# Patient Record
Sex: FEMALE | Race: WHITE | Employment: OTHER | URBAN - METROPOLITAN AREA
[De-identification: names, ages, dates, MRNs, and addresses within clinical notes are randomized per-mention and may not be internally consistent; named-entity substitution may affect disease eponyms.]

---

## 2017-01-05 ENCOUNTER — TRANSFERRED RECORDS (OUTPATIENT)
Dept: FAMILY MEDICINE | Facility: CLINIC | Age: 82
End: 2017-01-05

## 2017-01-05 LAB
ALBUMIN SERPL-MCNC: 3.6 G/DL (ref 3.4–5)
ALP SERPL-CCNC: 139 U/L (ref 46–116)
ALT SERPL-CCNC: 32 U/L (ref 0–78)
ANION GAP SERPL CALCULATED.3IONS-SCNC: 15.2 MMOL/L
AST SERPL-CCNC: 61 U/L (ref 0–37)
BILIRUB SERPL-MCNC: 1.5 MG/DL (ref 0.1–1.2)
BUN SERPL-MCNC: 13 MG/DL (ref 5–22)
CALCIUM SERPL-MCNC: 9.3 MG/DL (ref 8.5–10.1)
CHLORIDE SERPLBLD-SCNC: 109 MMOL/L (ref 98–107)
CO2 SERPL-SCNC: 25 MMOL/L (ref 21–32)
CREAT SERPL-MCNC: 0.97 MG/DL (ref 0.6–1.02)
ERYTHROCYTE [DISTWIDTH] IN BLOOD BY AUTOMATED COUNT: 15.3 %
GLUCOSE SERPL-MCNC: 121 MG/DL (ref 70–99)
HCT VFR BLD AUTO: 41.6 % (ref 35–47)
HEMOGLOBIN: 12.9 G/DL (ref 11.7–15.7)
INR PPP: 1.2 (ref 1–1.2)
MCH RBC QN AUTO: 27.9 PG (ref 26–33)
MCHC RBC AUTO-ENTMCNC: 31 G/DL (ref 31–36)
MCV RBC AUTO: 90 FL (ref 78–100)
PLATELET # BLD AUTO: 180 10^9/L
POTASSIUM SERPL-SCNC: 3.8 MMOL/L (ref 3.5–5.1)
PROT SERPL-MCNC: 6.7 G/DL (ref 6.4–8.2)
RBC # BLD AUTO: 4.61 10*12/L (ref 3.8–5.2)
SODIUM SERPL-SCNC: 145 MMOL/L (ref 136–145)
WBC # BLD AUTO: 7.7 10*9/L (ref 4–11)

## 2017-01-06 ENCOUNTER — TRANSFERRED RECORDS (OUTPATIENT)
Dept: FAMILY MEDICINE | Facility: CLINIC | Age: 82
End: 2017-01-06

## 2017-01-12 DIAGNOSIS — I10 ESSENTIAL HYPERTENSION, BENIGN: Primary | ICD-10-CM

## 2017-01-12 DIAGNOSIS — K74.60 CIRRHOSIS, NON-ALCOHOLIC (H): ICD-10-CM

## 2017-01-12 DIAGNOSIS — R00.0 SINUS TACHYCARDIA: ICD-10-CM

## 2017-01-12 DIAGNOSIS — E66.811 OBESITY (BMI 30.0-34.9): ICD-10-CM

## 2017-01-12 RX ORDER — METOPROLOL TARTRATE 25 MG/1
TABLET, FILM COATED ORAL
Qty: 180 TABLET | Refills: 0 | OUTPATIENT
Start: 2017-01-12

## 2017-01-12 RX ORDER — SPIRONOLACTONE 25 MG/1
TABLET ORAL
Qty: 90 TABLET | Refills: 0 | OUTPATIENT
Start: 2017-01-12

## 2017-01-12 NOTE — TELEPHONE ENCOUNTER
Pending Prescriptions:                       Disp   Refills    spironolactone (ALDACTONE) 25 MG tablet [*90 tab*0            Sig: TAKE ONE TABLET BY MOUTH ONE TIME DAILY     metoprolol (LOPRESSOR) 25 MG tablet [Phar*180 ta*0            Sig: TAKE ONE TABLET BY MOUTH TWICE DAILY     Pt refills were for one month and two months.   Pt should of been out. Per notes, 8-4-2016 pt was to rtc in three months. Pt was here in  for another reason, no blood work.   Please change qty, advise, or fax, or send to KAYLA Arroyo  [phone] (home)

## 2017-01-12 NOTE — TELEPHONE ENCOUNTER
I think she is getting her medications from cardiology since she should have run out 11/2016.  Denied

## 2017-01-19 RX ORDER — METOPROLOL TARTRATE 25 MG/1
TABLET, FILM COATED ORAL
Qty: 180 TABLET | Refills: 0 | OUTPATIENT
Start: 2017-01-19

## 2017-01-19 RX ORDER — SPIRONOLACTONE 25 MG/1
TABLET ORAL
Qty: 90 TABLET | Refills: 0 | OUTPATIENT
Start: 2017-01-19

## 2017-01-19 NOTE — TELEPHONE ENCOUNTER
Refused Prescriptions:                       Disp   Refills    metoprolol (LOPRESSOR) 25 MG tablet [Pharm*180 ta*0        Sig: TAKE ONE TABLET BY MOUTH TWICE DAILY   Refused By: SELIN GUTIERREZ  Reason for Refusal: Originating/Specialty Provider to approve  Reason for Refusal Comment: cardiology    spironolactone (ALDACTONE) 25 MG tablet [P*90 tab*0        Sig: TAKE ONE TABLET BY MOUTH ONE TIME DAILY   Refused By: SELIN GUTIERREZ  Reason for Refusal: Originating/Specialty Provider to approve  Reason for Refusal Comment: cardiology     See refill encounter with same med request. 1-  Denied by [facility], should of been out by / Cardiology doing Rx's.  Cruz  [phone] (home)

## 2017-01-20 RX ORDER — METOPROLOL TARTRATE 25 MG/1
TABLET, FILM COATED ORAL
Qty: 60 TABLET | Refills: 0 | OUTPATIENT
Start: 2017-01-20

## 2017-01-20 RX ORDER — SPIRONOLACTONE 25 MG/1
TABLET ORAL
Qty: 30 TABLET | Refills: 0 | OUTPATIENT
Start: 2017-01-20

## 2017-01-20 NOTE — TELEPHONE ENCOUNTER
Refused Prescriptions:                       Disp   Refills    spironolactone (ALDACTONE) 25 MG tablet [P*30 tab*0        Sig: TAKE ONE TABLET BY MOUTH ONE TIME DAILY   Refused By: SELIN GUTIERREZ  Reason for Refusal: Originating/Specialty Provider to approve    metoprolol (LOPRESSOR) 25 MG tablet [Pharm*60 tab*0        Sig: TAKE ONE TABLET BY MOUTH TWICE DAILY   Refused By: SELIN GUTIERREZ  Reason for Refusal: Originating/Specialty Provider to approve    See refill encounter from 1/12-1-  Cruz  [phone] (home)

## 2017-01-23 ENCOUNTER — OFFICE VISIT (OUTPATIENT)
Dept: FAMILY MEDICINE | Facility: CLINIC | Age: 82
End: 2017-01-23

## 2017-01-23 VITALS
BODY MASS INDEX: 32.09 KG/M2 | DIASTOLIC BLOOD PRESSURE: 80 MMHG | OXYGEN SATURATION: 90 % | SYSTOLIC BLOOD PRESSURE: 126 MMHG | HEART RATE: 89 BPM | WEIGHT: 192.6 LBS | TEMPERATURE: 97.8 F | HEIGHT: 65 IN | RESPIRATION RATE: 16 BRPM

## 2017-01-23 DIAGNOSIS — R00.0 SINUS TACHYCARDIA: ICD-10-CM

## 2017-01-23 DIAGNOSIS — I10 ESSENTIAL HYPERTENSION, BENIGN: ICD-10-CM

## 2017-01-23 DIAGNOSIS — K74.60 CIRRHOSIS, NON-ALCOHOLIC (H): ICD-10-CM

## 2017-01-23 DIAGNOSIS — E66.811 OBESITY (BMI 30.0-34.9): ICD-10-CM
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
## 2017-01-23 DIAGNOSIS — E11.49 DIABETES MELLITUS TYPE 2 WITH NEUROLOGICAL MANIFESTATIONS (H): Primary | ICD-10-CM

## 2017-01-23 DIAGNOSIS — E78.2 MIXED HYPERLIPIDEMIA: ICD-10-CM

## 2017-01-23 LAB — HBA1C MFR BLD: 6.9 % (ref 4–7)

## 2017-01-23 PROCEDURE — 83036 HEMOGLOBIN GLYCOSYLATED A1C: CPT | Performed by: FAMILY MEDICINE

## 2017-01-23 PROCEDURE — 99214 OFFICE O/P EST MOD 30 MIN: CPT | Performed by: FAMILY MEDICINE

## 2017-01-23 PROCEDURE — 36415 COLL VENOUS BLD VENIPUNCTURE: CPT | Performed by: FAMILY MEDICINE

## 2017-01-23 RX ORDER — SIMVASTATIN 20 MG
20 TABLET ORAL AT BEDTIME
Qty: 30 TABLET | Refills: 0 | Status: SHIPPED | OUTPATIENT
Start: 2017-01-23 | End: 2017-02-22

## 2017-01-23 RX ORDER — BENZOCAINE/MENTHOL 6 MG-10 MG
LOZENGE MUCOUS MEMBRANE
COMMUNITY
Start: 2017-01-23 | End: 2018-01-23

## 2017-01-23 RX ORDER — SPIRONOLACTONE 25 MG/1
25 TABLET ORAL DAILY
Qty: 30 TABLET | Refills: 0 | Status: SHIPPED | OUTPATIENT
Start: 2017-01-23 | End: 2017-02-22

## 2017-01-23 RX ORDER — METOPROLOL TARTRATE 25 MG/1
25 TABLET, FILM COATED ORAL 2 TIMES DAILY
Qty: 60 TABLET | Refills: 0 | Status: SHIPPED | OUTPATIENT
Start: 2017-01-23 | End: 2017-02-22

## 2017-01-23 RX ORDER — GINSENG 100 MG
CAPSULE ORAL 2 TIMES DAILY
COMMUNITY
Start: 2017-01-23 | End: 2018-01-23

## 2017-01-23 RX ORDER — METFORMIN HCL 500 MG
2000 TABLET, EXTENDED RELEASE 24 HR ORAL
Qty: 120 TABLET | Refills: 0 | Status: SHIPPED | OUTPATIENT
Start: 2017-01-23 | End: 2017-02-22

## 2017-01-23 RX ORDER — LOSARTAN POTASSIUM 50 MG/1
50 TABLET ORAL DAILY
Qty: 30 TABLET | Refills: 0 | Status: SHIPPED | OUTPATIENT
Start: 2017-01-23 | End: 2017-02-22

## 2017-01-23 NOTE — NURSING NOTE
Evy Song is here for bed bugs with red marks on back and left arm. And medication refill on Spironolactone and Metoprolol.     Pre-Visit Screening :  Immunizations : up to date    Colonoscopy : is up to date  Mammogram : is up to date  Asthma Action Test/Plan : NA  PHQ9/GAD7 :  NA    BP done on the left arm, with a reg sized cuff.  Pulse - regular  My Chart - accepts    CLASSIFICATION OF OVERWEIGHT AND OBESITY BY BMI                         Obesity Class           BMI(kg/m2)  Underweight                                    < 18.5  Normal                                         18.5-24.9  Overweight                                     25.0-29.9  OBESITY                     I                  30.0-34.9                              II                 35.0-39.9  EXTREME OBESITY             III                >40                             Patient's  BMI Body mass index is 32.05 kg/(m^2).  http://hin.nhlbi.nih.gov/menuplanner/menu.cgi  Questioned patient about current smoking habits.  Pt. has never smoked.

## 2017-01-23 NOTE — PROGRESS NOTES
SUBJECTIVE:    Recheck bug bites: getting pest control and using topical medications/improving    Evy Song is an 82 year old female who presents for evaluation and treatment   of Type 2 diabetes mellitus. SHE HAS BEEN ATKING 2 METFORMIN daily instead of 4.  Age at diagnosis 75. Family history   positive for diabetes in the patient s father.   Previous treatment modalities employed include diet, oral agents, exercise and ASA.   Current treatment includes none.     Current monitoring regimen: home blood tests - weekly  Home blood sugar records: 130-200  Last HgbA1c: 6.9  Diabetic complications: peripheral neuropathy and cardiovascular disease  Cardiovascular risk factors: family history, lipids, diabetes mellitus, hypertension, obesity, sedentary life style and cirrhosis/ tachycardia with VT    Current Outpatient Prescriptions   Medication     bacitracin 500 UNIT/GM OINT     hydrocortisone (CORTAID) 1 % cream     spironolactone (ALDACTONE) 25 MG tablet     metFORMIN (GLUCOPHAGE-XR) 500 MG 24 hr tablet     metoprolol (LOPRESSOR) 25 MG tablet     losartan (COZAAR) 50 MG tablet     simvastatin (ZOCOR) 20 MG tablet     rOPINIRole (REQUIP) 0.25 MG tablet     ferrous gluconate (FERGON) 324 (38 FE) MG tablet     fluticasone (FLONASE) 50 MCG/ACT nasal spray     ALBUTEROL 108 (90 BASE) MCG/ACT inhaler     ONE TOUCH ULTRA test strip     RESTASIS 0.05 % ophthalmic emulsion     LYRICA 75 MG capsule     EPINEPHrine (EPIPEN 2-LALA) 0.3 MG/0.3ML injection     polycarbophil (FIBERCON) 625 MG tablet     AZOPT 1 % OP SUSP     XALATAN 0.005 % OP SOLN     ASPIRIN 81 MG OR TABS     [DISCONTINUED] spironolactone (ALDACTONE) 25 MG tablet     [DISCONTINUED] metFORMIN (GLUCOPHAGE-XR) 500 MG 24 hr tablet     [DISCONTINUED] metoprolol (LOPRESSOR) 25 MG tablet     [DISCONTINUED] losartan (COZAAR) 50 MG tablet     [DISCONTINUED] simvastatin (ZOCOR) 20 MG tablet     No current facility-administered medications for this visit.     Allergies  "  Allergen Reactions     Monosodium Glutamate Cough and Shortness Of Breath     Timolol Rash     Bee Venom      hives,anaphylaxis     Benadryl [Diphenhydramine] Other (See Comments)     Lightheaded     Celecoxib      spasms       Social History   Substance Use Topics     Smoking status: Never Smoker      Smokeless tobacco: Never Used     Alcohol Use: No       Review Of Systems  Skin: bites from recent bed bug infiltration/ brings a bag of the bugs/ Children are working to destroy  Eyes: negative  Ears/Nose/Throat: negative  Respiratory: Cough- seeing pulmonary  Cardiovascular: tachycardia seeing cardiology  Gastrointestinal: negative  Genitourinary: cirrhosis nonalcoholic  Musculoskeletal: negative  Neurologic: numbness or tingling of feet and restless legs  Psychiatric: sleep disturbance and feeling anxious  Hematologic/Lymphatic/Immunologic: anemia  Endocrine: diabetes    OBJECTIVE:  /80 mmHg  Pulse 89  Temp(Src) 97.8  F (36.6  C) (Oral)  Ht 1.651 m (5' 5\")  Wt 87.363 kg (192 lb 9.6 oz)  BMI 32.05 kg/m2  SpO2 90%  General appearance: healthy, alert, no distress, cooperative, smiling, over weight and angry about being on medications  Skin: positives: scattered bites with excoriations  Eyes: conjunctivae/corneas clear. PERRL, EOM's intact. Fundi benign  Ears: negative  Oropharynx: Lips, mucosa, and tongue normal. Teeth and gums normal.  Neck: Neck supple. No adenopathy. Thyroid symmetric, normal size,, Carotids without bruits.  Lungs: negative, Percussion normal. Good diaphragmatic excursion. Lungs clear  Heart: negative, PMI normal. No lifts, heaves, or thrills. RRR. No murmurs, clicks gallops or rub  Abdomen: Abdomen soft, non-tender. BS normal. No masses, organomegaly, positive findings: distended  Extremities: Extremities normal. No deformities, edema, or skin discoloration.  Peripheral pulses: radial=4/4, femoral=4/4, popliteal=4/4, dorsalis pedis=4/4,  Neuro: Gait normal. Reflexes normal and " symmetric. Sensation grossly WNL.    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      (E66.9) Obesity (BMI 30.0-34.9)  Plan: metFORMIN (GLUCOPHAGE-XR) 500 MG 24 hr tablet,         simvastatin (ZOCOR) 20 MG tablet, Hemoglobin         A1c (BFP), VENOUS COLLECTION        A low fat diet, regular aerobic exercise like walking 30 minutes daily and weight control is the treatment recommendations at this time    Reviewed concepts of diabetes self-management stressing the primary   role of the patient in monitoring and maintaining control of   diabetes.

## 2017-01-23 NOTE — PATIENT INSTRUCTIONS
Start back on your daily medication    Recheck fasting in 4 weeks    Reviewed concepts of diabetes self-management stressing the primary   role of the patient in monitoring and maintaining control of   Diabetes. A low fat diet, regular aerobic exercise like walking 30 minutes daily and weight control is the treatment recommendations at this time

## 2017-01-23 NOTE — MR AVS SNAPSHOT
"              After Visit Summary   1/23/2017    Evy Song    MRN: [id number]           Patient Information     Date Of Birth          6/23/1934        Visit Information        Provider Department      1/23/2017 1:15 PM Liz Chapin MD [facility]        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 directly at [phone].  Normal or non-critical lab and imaging results will be communicated to you by POPRAGEOUShart, letter or phone within 4 business days after the clinic has received the results. If you do not hear from us within 7 days, please contact the clinic through POPRAGEOUShart or phone. If you have a critical or abnormal lab result, we will notify you by phone as soon as possible.  Submit refill requests through Brevity or call your pharmacy and they will forward the refill request to us. Please allow 3 business days for your refill to be completed.          Additional Information About Your Visit        POPRAGEOUShart Information     Brevity lets you send messages to your doctor, view your test results, renew your prescriptions, schedule appointments and more. To sign up, go to [email or web address] . Click on \"Log in\" on the left side of the screen, which will take you to the Welcome page. Then click on \"Sign up Now\" on the right side of the page.     You will be asked to enter the access code listed below, as well as some personal information. Please " "follow the directions to create your username and password.     Your access code is: [email or web address]  Expires: 2017  1:59 PM     Your access code will  in 90 days. If you need help or a new code, please call your [facility] clinic or [phone].        Care EveryWhere ID     This is your Care EveryWhere ID. This could be used by other organizations to access your [facility] medical records  [id number]        Your Vitals Were     Pulse Temperature Height BMI (Body Mass Index) Pulse Oximetry       89 97.8  F (36.6  C) (Oral) 1.651 m (5' 5\") 32.05 kg/m2 90%        Blood Pressure from Last 3 Encounters:   17 126/80   10/20/16 136/76   10/14/16 132/74    Weight from Last 3 Encounters:   17 87.363 kg (192 lb 9.6 oz)   10/20/16 86.456 kg (190 lb 9.6 oz)   10/14/16 87.091 kg (192 lb)              We Performed the Following     Hemoglobin A1c (BFP)     VENOUS COLLECTION          Today's Medication Changes          These changes are accurate as of: 17  1:59 PM.  If you have any questions, ask your nurse or doctor.               Start taking these medicines.        Dose/Directions    bacitracin 500 UNIT/GM Oint   Used for:  Bed bug bite, initial encounter   Started by:  Liz Chapin MD        Apply topically 2 times daily   Refills:  0       hydrocortisone 1 % cream   Commonly known as:  CORTAID   Used for:  Bed bug bite, initial encounter   Started by:  Liz Chapin MD        Apply sparingly to affected area three times daily for 14 days.   Refills:  0         These medicines have changed or have updated prescriptions.        Dose/Directions    fluticasone 50 MCG/ACT spray   Commonly known as:  FLONASE   This may have changed:    - when to take this  - reasons to take this   Used for:  Chronic rhinitis        Dose:  1-2 spray   Spray 1-2 sprays into both nostrils daily   Quantity:  16 g   Refills:  3            Where to get your medicines      These medications were sent to [id number] - " [place], [place] - [address], [facility] [address]     Phone:  [phone]    - losartan 50 MG tablet  - metFORMIN 500 MG 24 hr tablet  - metoprolol 25 MG tablet  - simvastatin 20 MG tablet  - spironolactone 25 MG tablet      Some of these will need a paper prescription and others can be bought over the counter.  Ask your nurse if you have questions.     You don't need a prescription for these medications    - bacitracin 500 UNIT/GM Oint  - hydrocortisone 1 % cream             Primary Care Provider Office Phone # Fax #    Liz Chapin [phone]       [facility] [address] [facility]  [address]  [facility] [address]        Thank you!     Thank you for choosing [facility]  for your care. Our goal is always to provide you with excellent care. Hearing back from our patients is one way we can continue to improve our services. Please take a few minutes to complete the written survey that you may receive in the mail after your visit with us. Thank you!             Your Updated Medication List - Protect others around you: Learn how to safely use, store and throw away your medicines at www.disposemymeds.org.          This list is accurate as of: 1/23/17  1:59 PM.  Always use your most recent med list.                   Brand Name Dispense Instructions for use    albuterol 108 (90 BASE) MCG/ACT Inhaler   Generic drug:  albuterol      INHALE 2 PUFF BY INHALATION ROUTE EVERY 4 - 6 HOURS AS NEEDED       aspirin 81 MG tablet     0    1 tab po QD (Once per day)       AZOPT 1 % ophthalmic susp   Generic drug:  brinzolamide      1 DROP BOTH EYES BID (am and hs)       bacitracin 500 UNIT/GM Oint      Apply topically 2 times daily       calcium polycarbophil 625 MG tablet    FIBERCON    120 tablet    Take 4 tablets (2,500 mg) by mouth daily       EPINEPHrine 0.3 MG/0.3ML injection    EPIPEN 2-LALA    2 each    Inject 0.3 mLs (0.3 mg) into the muscle  once as needed for anaphylaxis       ferrous gluconate 324 (38 FE) MG tablet    FERGON     Take 1 tablet (324 mg) by mouth daily (with breakfast)       fluticasone 50 MCG/ACT spray    FLONASE    16 g    Spray 1-2 sprays into both nostrils daily       hydrocortisone 1 % cream    CORTAID     Apply sparingly to affected area three times daily for 14 days.       losartan 50 MG tablet    COZAAR    30 tablet    Take 1 tablet (50 mg) by mouth daily       LYRICA 75 MG capsule   Generic drug:  pregabalin      Take 150 mg by mouth nightly as needed       metFORMIN 500 MG 24 hr tablet    GLUCOPHAGE-XR    120 tablet    Take 4 tablets (2,000 mg) by mouth daily (with dinner)       metoprolol 25 MG tablet    LOPRESSOR    60 tablet    Take 1 tablet (25 mg) by mouth 2 times daily       ONE TOUCH ULTRA test strip   Generic drug:  blood glucose monitoring          RESTASIS 0.05 % ophthalmic emulsion   Generic drug:  cycloSPORINE      Place 1 drop into both eyes as needed       rOPINIRole 0.25 MG tablet    REQUIP     Take 0.25 mg by mouth as needed       simvastatin 20 MG tablet    ZOCOR    30 tablet    Take 1 tablet (20 mg) by mouth At Bedtime       spironolactone 25 MG tablet    ALDACTONE    30 tablet    Take 1 tablet (25 mg) by mouth daily       XALATAN 0.005 % ophthalmic solution   Generic drug:  latanoprost     0    1 drop  bot eyes at hs

## 2017-01-27 ENCOUNTER — TELEPHONE (OUTPATIENT)
Dept: FAMILY MEDICINE | Facility: CLINIC | Age: 82
End: 2017-01-27

## 2017-01-27 NOTE — TELEPHONE ENCOUNTER
Pharmacy called yesterday and had a question about her medication. Pt sates that she was to be on a dieretic. Only received 4 prescriptions.    I called them back, Pt picked up all medications.

## 2017-02-14 ENCOUNTER — TRANSFERRED RECORDS (OUTPATIENT)
Dept: FAMILY MEDICINE | Facility: CLINIC | Age: 82
End: 2017-02-14

## 2017-02-22 ENCOUNTER — OFFICE VISIT (OUTPATIENT)
Dept: FAMILY MEDICINE | Facility: CLINIC | Age: 82
End: 2017-02-22

## 2017-02-22 VITALS
HEIGHT: 64 IN | HEART RATE: 78 BPM | DIASTOLIC BLOOD PRESSURE: 80 MMHG | WEIGHT: 187 LBS | SYSTOLIC BLOOD PRESSURE: 120 MMHG | RESPIRATION RATE: 16 BRPM | OXYGEN SATURATION: 98 % | TEMPERATURE: 98.2 F | BODY MASS INDEX: 31.92 KG/M2

## 2017-02-22 DIAGNOSIS — E11.49 DIABETES MELLITUS TYPE 2 WITH NEUROLOGICAL MANIFESTATIONS (H): Primary | ICD-10-CM

## 2017-02-22 DIAGNOSIS — R00.0 SINUS TACHYCARDIA: ICD-10-CM

## 2017-02-22 DIAGNOSIS — I10 ESSENTIAL HYPERTENSION, BENIGN: ICD-10-CM

## 2017-02-22 DIAGNOSIS — E66.811 OBESITY (BMI 30.0-34.9): ICD-10-CM

## 2017-02-22 DIAGNOSIS — Z76.0 ENCOUNTER FOR MEDICATION REFILL: ICD-10-CM

## 2017-02-22 DIAGNOSIS — K74.60 CIRRHOSIS, NON-ALCOHOLIC (H): ICD-10-CM

## 2017-02-22 DIAGNOSIS — E78.2 MIXED HYPERLIPIDEMIA: ICD-10-CM

## 2017-02-22 LAB — HBA1C MFR BLD: 6.6 % (ref 4–7)

## 2017-02-22 PROCEDURE — 99214 OFFICE O/P EST MOD 30 MIN: CPT | Performed by: FAMILY MEDICINE

## 2017-02-22 PROCEDURE — 80053 COMPREHEN METABOLIC PANEL: CPT | Mod: 90 | Performed by: FAMILY MEDICINE

## 2017-02-22 PROCEDURE — 36415 COLL VENOUS BLD VENIPUNCTURE: CPT | Performed by: FAMILY MEDICINE

## 2017-02-22 PROCEDURE — 80061 LIPID PANEL: CPT | Mod: 90 | Performed by: FAMILY MEDICINE

## 2017-02-22 PROCEDURE — 83036 HEMOGLOBIN GLYCOSYLATED A1C: CPT | Performed by: FAMILY MEDICINE

## 2017-02-22 RX ORDER — SPIRONOLACTONE 25 MG/1
25 TABLET ORAL DAILY
Qty: 90 TABLET | Refills: 1 | Status: SHIPPED | OUTPATIENT
Start: 2017-02-22 | End: 2017-08-21

## 2017-02-22 RX ORDER — METFORMIN HCL 500 MG
2000 TABLET, EXTENDED RELEASE 24 HR ORAL
Qty: 360 TABLET | Refills: 1 | Status: SHIPPED | OUTPATIENT
Start: 2017-02-22 | End: 2017-08-21

## 2017-02-22 RX ORDER — METOPROLOL TARTRATE 25 MG/1
25 TABLET, FILM COATED ORAL 2 TIMES DAILY
Qty: 180 TABLET | Refills: 1 | Status: SHIPPED | OUTPATIENT
Start: 2017-02-22 | End: 2017-08-21

## 2017-02-22 RX ORDER — SIMVASTATIN 20 MG
20 TABLET ORAL AT BEDTIME
Qty: 90 TABLET | Refills: 1 | Status: SHIPPED | OUTPATIENT
Start: 2017-02-22 | End: 2017-08-21

## 2017-02-22 RX ORDER — LOSARTAN POTASSIUM 50 MG/1
50 TABLET ORAL DAILY
Qty: 90 TABLET | Refills: 1 | Status: SHIPPED | OUTPATIENT
Start: 2017-02-22 | End: 2017-08-21

## 2017-02-22 NOTE — NURSING NOTE
Patient is here for a recheck of their medication.  Pre-Visit Screening :  Immunizations : up to date    Colonoscopy : NA  Mammogram : NA  Asthma Action Test/Plan : NA  PHQ9/GAD7 :  NA  Pulse - regular    Medication Reconciliation: complete      CLASSIFICATION OF OVERWEIGHT AND OBESITY BY BMI                         Obesity Class           BMI(kg/m2)  Underweight                                    < 18.5  Normal                                         18.5-24.9  Overweight                                     25.0-29.9  OBESITY                     I                  30.0-34.9                              II                 35.0-39.9  EXTREME OBESITY             III                >40                             Patient's  BMI Body mass index is 32.1 kg/(m^2).  http://hin.nhlbi.nih.gov/menuplanner/menu.cgi  Questioned patient about current smoking habits.  Pt. has never smoked.

## 2017-02-22 NOTE — PATIENT INSTRUCTIONS
A low fat diet, regular aerobic exercise like walking 30 minutes daily and weight control is the treatment recommendations at this time    See you in 3 months and don't fast/ schedule a physical

## 2017-02-22 NOTE — LETTER
[facility]  [facility] [address]  [place]  [address]    February 23, 2017        Evy Song  [address] [facility] [address]              Dear Evy Song      LAB RESULTS:     The results of your recent Blood Sugar and HDL Cholesterol 38 (desired result above 46) were ABNORMAL and expected with type 2 diabetes. To increase your HDL (protective) cholesterol which lowers your risk of heart attack and stroke, a daily walking program of 30 minutes nonstop with weight loss is advised.    Your liver function testing is stable and unchanged.  I have sent these to your GI specialists today.    If you have any further questions or problems, please contact our office at [phone].  Let's recheck a HGB A1C, weight and blood pressure in 3 months. You do not need to fast for that visit.          Liz Chapin MD

## 2017-02-22 NOTE — MR AVS SNAPSHOT
"              After Visit Summary   2/22/2017    Evy Song    MRN: [id number]           Patient Information     Date Of Birth          6/23/1934        Visit Information        Provider Department      2/22/2017 10:45 AM Liz Chapin MD [facility]        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      Who to contact     If you have questions or need follow up information about today's clinic visit or your schedule please contact [facility] directly at [phone].  Normal or non-critical lab and imaging results will be communicated to you by Sellvanahart, letter or phone within 4 business days after the clinic has received the results. If you do not hear from us within 7 days, please contact the clinic through EyeEmt or phone. If you have a critical or abnormal lab result, we will notify you by phone as soon as possible.  Submit refill requests through ticckle or call your pharmacy and they will forward the refill request to us. Please allow 3 business days for your refill to be completed.          Additional Information About Your Visit        Sellvanahart Information     ticckle lets you send messages to your doctor, view your test results, renew your prescriptions, schedule appointments and more. To sign up, go to [email or web address] . Click on \"Log in\" on the " "left side of the screen, which will take you to the Welcome page. Then click on \"Sign up Now\" on the right side of the page.     You will be asked to enter the access code listed below, as well as some personal information. Please follow the directions to create your username and password.     Your access code is: [email or web address]  Expires: 2017  1:59 PM     Your access code will  in 90 days. If you need help or a new code, please call your [facility] clinic or [phone].        Care EveryWhere ID     This is your Care EveryWhere ID. This could be used by other organizations to access your [facility] medical records  [id number]        Your Vitals Were     Temperature Height Pulse Oximetry Peak Flow BMI (Body Mass Index)       98.2  F (36.8  C) (Oral) 1.626 m (5' 4\") 98% 64 L/min 32.1 kg/m2        Blood Pressure from Last 3 Encounters:   17 120/80   17 126/80   10/20/16 136/76    Weight from Last 3 Encounters:   17 84.8 kg (187 lb)   17 87.4 kg (192 lb 9.6 oz)   10/20/16 86.5 kg (190 lb 9.6 oz)              We Performed the Following     COMPREHENSIVE METABOLIC PANEL     HEMOGLOBIN A1C     LIPID PANEL     VENIPUNC FNGR,HEEL,EAR [[id number]]          Today's Medication Changes          These changes are accurate as of: 17 12:28 PM.  If you have any questions, ask your nurse or doctor.               These medicines have changed or have updated prescriptions.        Dose/Directions    fluticasone 50 MCG/ACT spray   Commonly known as:  FLONASE   This may have changed:    - when to take this  - reasons to take this   Used for:  Chronic rhinitis        Dose:  1-2 spray   Spray 1-2 sprays into both nostrils daily   Quantity:  16 g   Refills:  3            Where to get your medicines      These medications were sent to [id number] - [place], [place] - [address]  [address], [facility] [address]     Phone:  [phone]     losartan 50 MG tablet    metFORMIN 500 MG 24 " hr tablet    metoprolol 25 MG tablet    simvastatin 20 MG tablet    spironolactone 25 MG tablet                Primary Care Provider Office Phone # Fax #    Liz Chapin [phone]       [facility] [address] [facility]  [address]  [facility] [address]        Thank you!     Thank you for choosing [facility]  for your care. Our goal is always to provide you with excellent care. Hearing back from our patients is one way we can continue to improve our services. Please take a few minutes to complete the written survey that you may receive in the mail after your visit with us. Thank you!             Your Updated Medication List - Protect others around you: Learn how to safely use, store and throw away your medicines at www.disposemymeds.org.          This list is accurate as of: 2/22/17 12:28 PM.  Always use your most recent med list.                   Brand Name Dispense Instructions for use    albuterol 108 (90 BASE) MCG/ACT Inhaler   Generic drug:  albuterol      INHALE 2 PUFF BY INHALATION ROUTE EVERY 4 - 6 HOURS AS NEEDED       aspirin 81 MG tablet     0    1 tab po QD (Once per day)       AZOPT 1 % ophthalmic susp   Generic drug:  brinzolamide      1 DROP BOTH EYES BID (am and hs)       bacitracin 500 UNIT/GM Oint      Apply topically 2 times daily       calcium polycarbophil 625 MG tablet    FIBERCON    120 tablet    Take 4 tablets (2,500 mg) by mouth daily       EPINEPHrine 0.3 MG/0.3ML injection    EPIPEN 2-LALA    2 each    Inject 0.3 mLs (0.3 mg) into the muscle once as needed for anaphylaxis       ferrous gluconate 324 (38 FE) MG tablet    FERGON     Take 1 tablet (324 mg) by mouth daily (with breakfast)       fluticasone 50 MCG/ACT spray    FLONASE    16 g    Spray 1-2 sprays into both nostrils daily       hydrocortisone 1 % cream    CORTAID     Apply sparingly to affected area three times daily for 14 days.       losartan 50 MG tablet    COZAAR    90  tablet    Take 1 tablet (50 mg) by mouth daily       LYRICA 75 MG capsule   Generic drug:  pregabalin      Take 150 mg by mouth nightly as needed       metFORMIN 500 MG 24 hr tablet    GLUCOPHAGE-XR    360 tablet    Take 4 tablets (2,000 mg) by mouth daily (with dinner)       metoprolol 25 MG tablet    LOPRESSOR    180 tablet    Take 1 tablet (25 mg) by mouth 2 times daily       ONE TOUCH ULTRA test strip   Generic drug:  blood glucose monitoring          RESTASIS 0.05 % ophthalmic emulsion   Generic drug:  cycloSPORINE      Place 1 drop into both eyes as needed       rOPINIRole 0.25 MG tablet    REQUIP     Take 0.25 mg by mouth as needed       simvastatin 20 MG tablet    ZOCOR    90 tablet    Take 1 tablet (20 mg) by mouth At Bedtime       spironolactone 25 MG tablet    ALDACTONE    90 tablet    Take 1 tablet (25 mg) by mouth daily       XALATAN 0.005 % ophthalmic solution   Generic drug:  latanoprost     0    1 drop  bot eyes at hs

## 2017-02-22 NOTE — PROGRESS NOTES
SUBJECTIVE:  Evy Song is an 82 year old female who presents for evaluation and treatment   of Type 2 diabetes mellitu now back on her full dose of metformin for 1 month and fating today. Age at diagnosis 75. Family history   positive for diabetes in the patient s father.   Previous treatment modalities employed include none, diet, oral agents and ASA.   Current treatment includes diet, oral agents, exercise and ASA.     Current monitoring regimen: home blood tests - once daily  Home blood sugar records:   Last HgbA1c: 6.5  Diabetic complications: peripheral neuropathy and cardiovascular disease  Cardiovascular risk factors: family history, lipids, diabetes mellitus, hypertension, obesity, sedentary life style and nonalcoholic cirrhosis and a tachycardia with VT controlled with medications    Current Outpatient Prescriptions   Medication     bacitracin 500 UNIT/GM OINT     hydrocortisone (CORTAID) 1 % cream     spironolactone (ALDACTONE) 25 MG tablet     metFORMIN (GLUCOPHAGE-XR) 500 MG 24 hr tablet     metoprolol (LOPRESSOR) 25 MG tablet     losartan (COZAAR) 50 MG tablet     simvastatin (ZOCOR) 20 MG tablet     rOPINIRole (REQUIP) 0.25 MG tablet     ferrous gluconate (FERGON) 324 (38 FE) MG tablet     fluticasone (FLONASE) 50 MCG/ACT nasal spray     ALBUTEROL 108 (90 BASE) MCG/ACT inhaler     ONE TOUCH ULTRA test strip     RESTASIS 0.05 % ophthalmic emulsion     LYRICA 75 MG capsule     EPINEPHrine (EPIPEN 2-LALA) 0.3 MG/0.3ML injection     polycarbophil (FIBERCON) 625 MG tablet     AZOPT 1 % OP SUSP     XALATAN 0.005 % OP SOLN     ASPIRIN 81 MG OR TABS     No current facility-administered medications for this visit.      Allergies   Allergen Reactions     Monosodium Glutamate Cough and Shortness Of Breath     Timolol Rash     Bee Venom      hives,anaphylaxis     Benadryl [Diphenhydramine] Other (See Comments)     Lightheaded     Celecoxib      spasms       Social History   Substance Use Topics      "Smoking status: Never Smoker     Smokeless tobacco: Never Used     Alcohol use No       Review Of Systems  Skin: negative  Eyes: negative  Ears/Nose/Throat: negative  Respiratory: No shortness of breath, dyspnea on exertion, cough, or hemoptysis  Cardiovascular: elevated cholesterol and blood pressure  Gastrointestinal: irritable colon and nonalcoholic cirrhosis  Genitourinary: negative  Musculoskeletal: negative  Neurologic: negative  Psychiatric: excessive stress  Hematologic/Lymphatic/Immunologic: negative  Endocrine: diabetes    OBJECTIVE:  /80 (BP Location: Right arm, Patient Position: Chair, Cuff Size: Adult Regular)  Temp 98.2  F (36.8  C) (Oral)  Ht 1.626 m (5' 4\")  Wt 84.8 kg (187 lb)  SpO2 98%  PF 64 L/min  BMI 32.1 kg/m2  General appearance: healthy, alert, no distress, cooperative, crying and over weight  Skin: Skin color, texture, turgor normal. No rashes or lesions.  Eyes: conjunctivae/corneas clear. PERRL, EOM's intact. Fundi benign  Ears: negative  Oropharynx: Lips, mucosa, and tongue normal. Teeth and gums normal.  Neck: Neck supple. No adenopathy. Thyroid symmetric, normal size,, Carotids without bruits.  Lungs: negative, Percussion normal. Good diaphragmatic excursion. Lungs clear  Heart: negative, PMI normal. No lifts, heaves, or thrills. RRR. No murmurs, clicks gallops or rub  Abdomen: Abdomen soft, non-tender. BS normal. No masses, organomegaly  Extremities: Extremities normal. No deformities, edema, or skin discoloration.  Peripheral pulses: radial=4/4, femoral=4/4, popliteal=4/4, dorsalis pedis=4/4,  Neuro: Gait normal. Reflexes normal and symmetric. Sensation grossly WNL.  BMI : Body mass index is 32.1 kg/(m^2).  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)  (primary encounter diagnosis)  Plan: VENIPUNC FNGR,HEEL,EAR [[id number]], HEMOGLOBIN A1C,        LIPID PANEL, COMPREHENSIVE METABOLIC PANEL,         metFORMIN (GLUCOPHAGE-XR) " 500 MG 24 hr tablet,         losartan (COZAAR) 50 MG tablet        A low fat diet, regular aerobic exercise like walking 30 minutes daily and weight control is the treatment recommendations at this time  Recheck 3 months/ no need to fast    (E78.2) Mixed hyperlipidemia  Plan: VENIPUNC FNGR,HEEL,EAR [[id number]], LIPID PANEL,         simvastatin (ZOCOR) 20 MG tablet        1)  Medication: continue current medication regimen unchanged  2)  Low fat, low cholesterol diet  3)  Regular aerobic exercise  4)  Recheck in 3 months, sooner should new symptoms or   problems arise.    Patient Education: Reviewed risks of elevated lipids and principles   of treatment.        (I10) Essential hypertension, benign  Plan: VENIPUNC FNGR,HEEL,EAR [[id number]], COMPREHENSIVE         METABOLIC PANEL, spironolactone (ALDACTONE) 25         MG tablet, metoprolol (LOPRESSOR) 25 MG tablet,        losartan (COZAAR) 50 MG tablet        1)  Medication: continue current medication regimen unchanged  2)  Dietary sodium restriction  3)  Regular aerobic exercise  4)  Recheck in 3 months, sooner should new symptoms or   problems arise.    Patient Education: Reviewed risks of hypertension and principles of   treatment.        (E66.9) Obesity (BMI 30.0-34.9)  Plan: VENIPUNC FNGR,HEEL,EAR [[id number]], LIPID PANEL,         COMPREHENSIVE METABOLIC PANEL, metFORMIN         (GLUCOPHAGE-XR) 500 MG 24 hr tablet,         simvastatin (ZOCOR) 20 MG tablet        Weight loss encouraged    (K74.60) Cirrhosis, non-alcoholic (H)  Plan: spironolactone (ALDACTONE) 25 MG tablet        I have reviewed the patient's medical history in detail and updated the computerized patient record.      (R00.0) Sinus tachycardia  Plan: metoprolol (LOPRESSOR) 25 MG tablet        I have reviewed the patient's medical history in detail and updated the computerized patient record.      (Z76.0) Encounter for medication refill  Plan: VENIPUNC FNGR,HEEL,EAR [[id number]], HEMOGLOBIN A1C,        LIPID PANEL,  COMPREHENSIVE METABOLIC PANEL

## 2017-02-23 LAB
ALBUMIN SERPL-MCNC: 3.5 G/DL (ref 3.6–5.1)
ALBUMIN/GLOB SERPL: 1.3 (CALC) (ref 1–2.5)
ALP SERPL-CCNC: 119 U/L (ref 33–130)
ALT SERPL-CCNC: 22 U/L (ref 6–29)
AST SERPL-CCNC: 51 U/L (ref 10–35)
BILIRUB SERPL-MCNC: 1.8 MG/DL (ref 0.2–1.2)
BUN SERPL-MCNC: 14 MG/DL (ref 7–25)
BUN/CREATININE RATIO: ABNORMAL (CALC) (ref 6–22)
CALCIUM SERPL-MCNC: 9.1 MG/DL (ref 8.6–10.4)
CHLORIDE SERPLBLD-SCNC: 109 MMOL/L (ref 98–110)
CHOLEST SERPL-MCNC: 138 MG/DL (ref 125–200)
CHOLEST/HDLC SERPL: 3.6 (CALC)
CO2 SERPL-SCNC: 22 MMOL/L (ref 20–31)
CREAT SERPL-MCNC: 0.81 MG/DL (ref 0.6–0.88)
EGFR AFRICAN AMERICAN - QUEST: 78 ML/MIN/1.73M2
GFR SERPL CREATININE-BSD FRML MDRD: 68 ML/MIN/1.73M2
GLOBULIN, CALCULATED - QUEST: 2.6 G/DL (CALC) (ref 1.9–3.7)
GLUCOSE - QUEST: 125 MG/DL (ref 65–99)
HDLC SERPL-MCNC: 38 MG/DL
LDLC SERPL CALC-MCNC: 82 MG/DL (CALC)
NONHDLC SERPL-MCNC: 100 MG/DL (CALC)
POTASSIUM SERPL-SCNC: 3.9 MMOL/L (ref 3.5–5.3)
PROT SERPL-MCNC: 6.1 G/DL (ref 6.1–8.1)
SODIUM SERPL-SCNC: 143 MMOL/L (ref 135–146)
TRIGL SERPL-MCNC: 92 MG/DL

## 2017-03-23 ENCOUNTER — TRANSFERRED RECORDS (OUTPATIENT)
Dept: FAMILY MEDICINE | Facility: CLINIC | Age: 82
End: 2017-03-23

## 2017-06-09 ENCOUNTER — TRANSFERRED RECORDS (OUTPATIENT)
Dept: FAMILY MEDICINE | Facility: CLINIC | Age: 82
End: 2017-06-09

## 2017-06-14 DIAGNOSIS — R05.9 COUGH: Primary | ICD-10-CM

## 2017-06-15 ENCOUNTER — HOSPITAL ENCOUNTER (OUTPATIENT)
Dept: LAB | Facility: CLINIC | Age: 82
Discharge: HOME | End: 2017-06-15
Attending: INTERNAL MEDICINE | Admitting: INTERNAL MEDICINE
Payer: COMMERCIAL

## 2017-06-15 DIAGNOSIS — R05.9 COUGH: ICD-10-CM

## 2017-06-15 LAB
BACTERIA SPEC CULT: ABNORMAL
FUNGUS SPEC CULT: ABNORMAL
GRAM STN SPEC: ABNORMAL
KOH PREP SPEC: NORMAL
Lab: ABNORMAL
MICRO REPORT STATUS: ABNORMAL
MICRO REPORT STATUS: NORMAL
SPECIMEN SOURCE: ABNORMAL
SPECIMEN SOURCE: NORMAL

## 2017-06-15 PROCEDURE — 87116 MYCOBACTERIA CULTURE: CPT | Performed by: INTERNAL MEDICINE

## 2017-06-15 PROCEDURE — 87210 SMEAR WET MOUNT SALINE/INK: CPT | Performed by: INTERNAL MEDICINE

## 2017-06-15 PROCEDURE — 87206 SMEAR FLUORESCENT/ACID STAI: CPT | Performed by: INTERNAL MEDICINE

## 2017-06-15 PROCEDURE — 87205 SMEAR GRAM STAIN: CPT | Performed by: INTERNAL MEDICINE

## 2017-06-17 DIAGNOSIS — R05.9 COUGH: Primary | ICD-10-CM

## 2017-06-19 LAB
ACID FAST STN SPEC QL: NORMAL
MICRO REPORT STATUS: NORMAL
SPECIMEN SOURCE: NORMAL

## 2017-06-25 DIAGNOSIS — E11.49 DIABETES MELLITUS TYPE 2 WITH NEUROLOGICAL MANIFESTATIONS (H): Primary | ICD-10-CM

## 2017-06-26 NOTE — TELEPHONE ENCOUNTER
Pending Prescriptions:                       Disp   Refills    ONE TOUCH ULTRA test strip [Pharmacy Med *50 each4            Sig: USE TO TEST BLOOD SUGARS ONCE DAILY AS DIRECTED    This is in chart as reported by pt.    Per last fouux-8-, pt was to rtc in 3 months   Please fax, deny or send to KAYLA Arroyo  [phone] (home)

## 2017-08-10 LAB
MICRO REPORT STATUS: NORMAL
MYCOBACTERIUM SPEC CULT: NORMAL
SPECIMEN SOURCE: NORMAL

## 2017-08-21 ENCOUNTER — OFFICE VISIT (OUTPATIENT)
Dept: FAMILY MEDICINE | Facility: CLINIC | Age: 82
End: 2017-08-21

## 2017-08-21 ENCOUNTER — TRANSFERRED RECORDS (OUTPATIENT)
Dept: FAMILY MEDICINE | Facility: CLINIC | Age: 82
End: 2017-08-21

## 2017-08-21 VITALS
BODY MASS INDEX: 32.65 KG/M2 | WEIGHT: 196 LBS | SYSTOLIC BLOOD PRESSURE: 138 MMHG | HEART RATE: 64 BPM | DIASTOLIC BLOOD PRESSURE: 86 MMHG | RESPIRATION RATE: 16 BRPM | OXYGEN SATURATION: 94 % | TEMPERATURE: 98.2 F | HEIGHT: 65 IN

## 2017-08-21 DIAGNOSIS — E11.49 DIABETES MELLITUS TYPE 2 WITH NEUROLOGICAL MANIFESTATIONS (H): Primary | ICD-10-CM

## 2017-08-21 DIAGNOSIS — I10 ESSENTIAL HYPERTENSION, BENIGN: ICD-10-CM

## 2017-08-21 DIAGNOSIS — Z76.0 ENCOUNTER FOR MEDICATION REFILL: ICD-10-CM

## 2017-08-21 DIAGNOSIS — J84.112 IPF (IDIOPATHIC PULMONARY FIBROSIS) (H): ICD-10-CM

## 2017-08-21 DIAGNOSIS — H40.1230 LOW-TENSION GLAUCOMA OF BOTH EYES, UNSPECIFIED GLAUCOMA STAGE: ICD-10-CM

## 2017-08-21 DIAGNOSIS — E66.811 OBESITY (BMI 30.0-34.9): ICD-10-CM

## 2017-08-21 DIAGNOSIS — E78.2 MIXED HYPERLIPIDEMIA: ICD-10-CM

## 2017-08-21 LAB
ALBUMIN URINE MG/G CR: <30 MG/G CREATININE
ALBUMIN URINE MG/SPEC: 10
CREATININE URINE: 100
HBA1C MFR BLD: 6.8 % (ref 4–7)

## 2017-08-21 PROCEDURE — 83036 HEMOGLOBIN GLYCOSYLATED A1C: CPT | Performed by: FAMILY MEDICINE

## 2017-08-21 PROCEDURE — 80061 LIPID PANEL: CPT | Mod: 90 | Performed by: FAMILY MEDICINE

## 2017-08-21 PROCEDURE — 99214 OFFICE O/P EST MOD 30 MIN: CPT | Performed by: FAMILY MEDICINE

## 2017-08-21 PROCEDURE — 80053 COMPREHEN METABOLIC PANEL: CPT | Mod: 90 | Performed by: FAMILY MEDICINE

## 2017-08-21 PROCEDURE — 36415 COLL VENOUS BLD VENIPUNCTURE: CPT | Performed by: FAMILY MEDICINE

## 2017-08-21 PROCEDURE — 82043 UR ALBUMIN QUANTITATIVE: CPT | Performed by: FAMILY MEDICINE

## 2017-08-21 RX ORDER — MOMETASONE FUROATE 100 UG/1
AEROSOL RESPIRATORY (INHALATION)
Refills: 3 | COMMUNITY
Start: 2017-08-03 | End: 2018-01-01

## 2017-08-21 RX ORDER — SIMVASTATIN 20 MG
20 TABLET ORAL AT BEDTIME
Qty: 90 TABLET | Refills: 1 | Status: SHIPPED | OUTPATIENT
Start: 2017-08-21 | End: 2018-01-01

## 2017-08-21 RX ORDER — METFORMIN HCL 500 MG
2000 TABLET, EXTENDED RELEASE 24 HR ORAL
Qty: 360 TABLET | Refills: 1 | Status: SHIPPED | OUTPATIENT
Start: 2017-08-21 | End: 2018-01-01

## 2017-08-21 RX ORDER — DORZOLAMIDE HCL 20 MG/ML
SOLUTION/ DROPS OPHTHALMIC
Refills: 11 | COMMUNITY
Start: 2017-08-18 | End: 2019-01-01

## 2017-08-21 RX ORDER — LOSARTAN POTASSIUM 50 MG/1
25 TABLET ORAL DAILY
COMMUNITY
Start: 2017-08-21 | End: 2018-01-01

## 2017-08-21 RX ORDER — CARVEDILOL 12.5 MG/1
12.5 TABLET ORAL 2 TIMES DAILY WITH MEALS
COMMUNITY
Start: 2017-08-21 | End: 2018-01-23

## 2017-08-21 RX ORDER — CARVEDILOL 12.5 MG/1
1 TABLET ORAL DAILY
Refills: 1 | COMMUNITY
Start: 2017-08-18 | End: 2017-08-21

## 2017-08-21 NOTE — PROGRESS NOTES
SUBJECTIVE:  Evy Song is an 83 year old female who presents for evaluation and treatment   of Type 2 diabetes mellitus fasting today / her Gi specialists made changes to her BP medication as they continue to follow her cirrhosis.  'Age at diagnosis 75. Family history   positive for diabetes in the patient s father.   Previous treatment modalities employed include diet, oral agents, exercise and ASA.   Current treatment includes diet, oral agents, exercise and ASA.     Current monitoring regimen: home blood tests - none  Home blood sugar records: NA  Last HgbA1c: 6.7  Diabetic complications: peripheral neuropathy and cardiovascular disease  Cardiovascular risk factors: family history, lipids, diabetes mellitus, hypertension, obesity, sedentary life style, stress and tachycardia with VT controlled with medications and nonalcoholic cirrhosis    Current Outpatient Prescriptions   Medication     carvedilol (COREG) 12.5 MG tablet     ASMANEX  MCG/ACT AERO     ONE TOUCH ULTRA test strip     spironolactone (ALDACTONE) 25 MG tablet     metFORMIN (GLUCOPHAGE-XR) 500 MG 24 hr tablet     metoprolol (LOPRESSOR) 25 MG tablet     losartan (COZAAR) 50 MG tablet     simvastatin (ZOCOR) 20 MG tablet     bacitracin 500 UNIT/GM OINT     hydrocortisone (CORTAID) 1 % cream     rOPINIRole (REQUIP) 0.25 MG tablet     ferrous gluconate (FERGON) 324 (38 FE) MG tablet     fluticasone (FLONASE) 50 MCG/ACT nasal spray     ALBUTEROL 108 (90 BASE) MCG/ACT inhaler     RESTASIS 0.05 % ophthalmic emulsion     LYRICA 75 MG capsule     EPINEPHrine (EPIPEN 2-LALA) 0.3 MG/0.3ML injection     polycarbophil (FIBERCON) 625 MG tablet     AZOPT 1 % OP SUSP     XALATAN 0.005 % OP SOLN     ASPIRIN 81 MG OR TABS     No current facility-administered medications for this visit.      Allergies   Allergen Reactions     Monosodium Glutamate Cough and Shortness Of Breath     Timolol Rash     Bee Venom      hives,anaphylaxis     Benadryl [Diphenhydramine]  "Other (See Comments)     Lightheaded     Celecoxib      spasms       Social History   Substance Use Topics     Smoking status: Never Smoker     Smokeless tobacco: Never Used     Alcohol use No       Review Of Systems  Skin: negative  Eyes: negative  Ears/Nose/Throat: negative  Respiratory: followed by Dr kaye  Cardiovascular: hypertension on new medications/ liver cirrhosis edema medication as well  Gastrointestinal: liver cirrhosis  Genitourinary: negative  Musculoskeletal: negative  Neurologic: negative  Psychiatric: excessive stress-very upsetting to change her lifestyle from alcohol and weight loss  Hematologic/Lymphatic/Immunologic: negative  Endocrine: diabetes    OBJECTIVE:  Pulse 64  Temp 98.2  F (36.8  C) (Oral)  Resp 16  Ht 1.651 m (5' 5\")  Wt 88.9 kg (196 lb)  SpO2 94%  BMI 32.62 kg/m2  General appearance: healthy, alert, no distress, cooperative, smiling and over weight  Skin: Skin color, texture, turgor normal. No rashes or lesions.  Eyes: conjunctivae/corneas clear. PERRL, EOM's intact. Fundi benign  Ears: negative  Oropharynx: Lips, mucosa, and tongue normal. Teeth and gums normal.  Neck: Neck supple. No adenopathy. Thyroid symmetric, normal size,, Carotids without bruits.  Lungs: negative, Percussion normal. Good diaphragmatic excursion. Lungs clear  Heart: negative, PMI normal. No lifts, heaves, or thrills. RRR. No murmurs, clicks gallops or rub  Abdomen: Abdomen soft, non-tender. BS normal. No masses, organomegaly, positive findings: obese  Extremities: Extremities normal. No deformities, edema, or skin discoloration.  Peripheral pulses: radial=4/4, femoral=4/4, popliteal=4/4, dorsalis pedis=4/4,  Neuro: Gait normal. Reflexes normal and symmetric. Sensation grossly WNL.  BMI : Body mass index is 32.62 kg/(m^2).    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 Recheck 6 months      (E78.2) Mixed hyperlipidemia  Plan: VENIPUNC FNGR,HEEL,EAR [[id number]], LIPID PANEL,         simvastatin (ZOCOR) 20 MG tablet        1)  Medication: continue current medication regimen unchanged  2)  Low fat, low cholesterol diet  3)  Regular aerobic exercise  4)  Recheck in 6 months, sooner should new symptoms or   problems arise.    Patient Education: Reviewed risks of elevated lipids and principles   of treatment.        (I10) Essential hypertension, benign  Plan: VENIPUNC FNGR,HEEL,EAR [[id number]], COMPREHENSIVE         METABOLIC PANEL, losartan (COZAAR) 50 MG         tablet, carvedilol (COREG) 12.5 MG tablet        I have reviewed the patient's medical history in detail and updated the computerized patient record. Follow Gi specialists recommendations.      (J84.112) IPF (idiopathic pulmonary fibrosis) (H)  Plan: ASMANEX  MCG/ACT AERO        I have reviewed the patient's medical history in detail and updated the computerized patient record.      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      (Z76.0) Encounter for medication refill  Plan: VENIPUNC FNGR,HEEL,EAR [[id number]], HEMOGLOBIN A1C,        Albumin Random Urine  Quantitative, LIPID PANEL,        COMPREHENSIVE METABOLIC PANEL

## 2017-08-21 NOTE — PATIENT INSTRUCTIONS
Take BP medication as prescribed by GI specialists    1)  Medication: continue current medication regimen unchanged  2)  Low fat, low cholesterol diet and low salt  3)  Regular aerobic exercise and weight loss  4)  Recheck in 6 months, sooner should new symptoms or   problems arise.    Patient Education: Reviewed risks of elevated lipids and principles   of treatment.

## 2017-08-21 NOTE — NURSING NOTE
Patient is here for a refill of her meds and also to talk about her other doctors appointments and what is going on with that.  Pre-Visit Screening not done today.  Pulse - regular  My Chart - declines    CLASSIFICATION OF OVERWEIGHT AND OBESITY BY BMI                         Obesity Class           BMI(kg/m2)  Underweight                                    < 18.5  Normal                                         18.5-24.9  Overweight                                     25.0-29.9  OBESITY                     I                  30.0-34.9                              II                 35.0-39.9  EXTREME OBESITY             III                >40                             Patient's  BMI Body mass index is 32.62 kg/(m^2).  http://hin.nhlbi.nih.gov/menuplanner/menu.cgi  Questioned patient about current smoking habits.  Pt. has never smoked.

## 2017-08-21 NOTE — LETTER
August 23, 2017      Evy Song  [address] [facility] [address]        Dear ,    We are writing to inform you of your test results. See next page    Your fasting blood glucose at 195 reflects your need for better control of your type 2 diabetes. A low fat diet, regular aerobic exercise like walking 30 minutes daily and weight loss is the treatment recommendations at this time.    Your liver function testing has improved. Mild changes in potassium and Chloride are not significant and maybe related to dehydration while you fasted.      Let's recheck in 6 months. I sent these labs to your GI specialists as we discussed at your visit.    If you have any questions or concerns, please call the clinic at the number listed above.       Sincerely,        Liz Chapin MD

## 2017-08-21 NOTE — MR AVS SNAPSHOT
After Visit Summary   8/21/2017    vEy Song    MRN: [id number]           Patient Information     Date Of Birth          6/23/1934        Visit Information        Provider Department      8/21/2017 11:00 AM Liz Chapin MD [facility]        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    Patient Education: Reviewed risks of elevated lipids and principles   of treatment.              Follow-ups after your visit        Who to contact     If you have questions or need follow up information about today's clinic visit or your schedule please contact [facility] directly at [phone].  Normal or non-critical lab and imaging results will be communicated to you by 1RP Mediahart, letter or phone within 4 business days after the clinic has received the results. If you do not hear from us within 7 days, please contact the clinic through Now Technologiest or phone. If you have a critical or abnormal lab result, we will notify you by phone as soon as possible.  Submit refill requests through reQall or call your pharmacy and they will forward the refill request to us. Please allow 3 business days for your refill to be completed.          Additional Information About Your Visit        1RP MediaharPro Hoop Strength Information     reQall lets you send messages to your doctor, view your test  "results, renew your prescriptions, schedule appointments and more. To sign up, go to [email or web address] . Click on \"Log in\" on the left side of the screen, which will take you to the Welcome page. Then click on \"Sign up Now\" on the right side of the page.     You will be asked to enter the access code listed below, as well as some personal information. Please follow the directions to create your username and password.     Your access code is: [email or web address]  Expires: 2017  2:33 PM     Your access code will  in 90 days. If you need help or a new code, please call your [facility] clinic or [phone].        Care EveryWhere ID     This is your Care EveryWhere ID. This could be used by other organizations to access your [facility] medical records  [id number]        Your Vitals Were     Pulse Temperature Respirations Height Pulse Oximetry BMI (Body Mass Index)    64 98.2  F (36.8  C) (Oral) 16 1.651 m (5' 5\") 94% 32.62 kg/m2       Blood Pressure from Last 3 Encounters:   17 120/80   17 126/80   10/20/16 136/76    Weight from Last 3 Encounters:   17 88.9 kg (196 lb)   17 84.8 kg (187 lb)   17 87.4 kg (192 lb 9.6 oz)              We Performed the Following     Albumin Random Urine Quantitative     COMPREHENSIVE METABOLIC PANEL     HEMOGLOBIN A1C     LIPID PANEL     VENIPUNC FNGR,HEEL,EAR [[id number]]          Today's Medication Changes          These changes are accurate as of: 17  2:33 PM.  If you have any questions, ask your nurse or doctor.               These medicines have changed or have updated prescriptions.        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  [address], [facility] [address]     Phone:  [phone]     metFORMIN 500 MG 24 hr tablet    simvastatin 20 MG tablet                Primary Care Provider Office Phone # Fax #    Liz Chapin [phone] [address] [facility]  [address]  [facility] [address]        Equal Access to Services     MASSIMO [facility] Hospital[facility]ALEY : Hadii aad ku hadasho Sovianney, waaxda luqadaha, qaybta kaalmada adeegyada, mateo hooks . So [facility] [phone].    ATENCIÓN: Si habla español, tiene a ta disposición servicios gratuitos de asistencia lingüística. LlJoint [facility] District Memorial Hospital [phone].    We comply with applicable federal civil rights laws and [place] laws. We do not discriminate on the basis of race, color, national origin, age, disability sex, sexual orientation or gender identity.            Thank you!     Thank you for choosing [facility]  for your care. Our goal is always to provide you with excellent care. Hearing back from our patients is one way we can continue to improve our services. Please take a few minutes to complete the written survey that you may receive in the mail after your visit with us. Thank you!             Your Updated Medication List - Protect others around you: Learn how to safely use, store and throw away your medicines at www.disposemymeds.org.          This list is accurate as of: 8/21/17  2:33 PM.  Always use your most recent med list.                   Brand Name Dispense Instructions for use Diagnosis    albuterol 108 (90 BASE) MCG/ACT Inhaler   Generic drug:  albuterol       INHALE 2 PUFF BY INHALATION ROUTE EVERY 4 - 6 HOURS AS NEEDED        ASMANEX  MCG/ACT Aero   Generic drug:  Mometasone Furoate       IPF (idiopathic pulmonary fibrosis) (H)       aspirin 81 MG tablet     0    1 tab po QD (Once per day)    Essential hypertension, benign       AZOPT 1 % ophthalmic susp   Generic drug:  brinzolamide      1 DROP BOTH EYES BID (am and hs)        bacitracin 500 UNIT/GM Oint      Apply topically 2 times daily    Bed bug bite, initial encounter       calcium polycarbophil 625 MG tablet    FIBERCON    120 tablet    Take 4 tablets (2,500 mg) by mouth daily    Irritable bowel syndrome       COREG 12.5 MG tablet   Generic drug:  carvedilol      Take 1 tablet (12.5 mg) by mouth 2 times daily (with meals)    Essential hypertension, benign       dorzolamide 2 % ophthalmic solution    TRUSOPT      Low-tension glaucoma of both eyes, unspecified glaucoma stage       EPINEPHrine 0.3 MG/0.3ML injection 2-pack    EPIPEN 2-LALA    2 each    Inject 0.3 mLs (0.3 mg) into the muscle once as needed for anaphylaxis    Hornet/wasp/bee sting       ferrous gluconate 324 (38 FE) MG tablet    FERGON     Take 1 tablet (324 mg) by mouth daily (with breakfast)    Other iron deficiency anemia, Restless leg syndrome       fluticasone 50 MCG/ACT spray    FLONASE    16 g    Spray 1-2 sprays into both nostrils daily    Chronic rhinitis       hydrocortisone 1 % cream    CORTAID     Apply sparingly to affected area three times daily for 14 days.    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)       LYRICA 75 MG capsule   Generic drug:  pregabalin      Take 150 mg by mouth nightly as needed        metFORMIN 500 MG 24 hr tablet    GLUCOPHAGE-XR    360 tablet    Take 4 tablets (2,000 mg) by mouth daily (with dinner)    Diabetes mellitus type 2 with  neurological manifestations (H), Obesity (BMI 30.0-34.9)       ONE TOUCH ULTRA test strip   Generic drug:  blood glucose monitoring     100 each    USE TO TEST BLOOD SUGARS ONCE DAILY AS DIRECTED    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

## 2017-08-22 LAB
ALBUMIN SERPL-MCNC: 3.3 G/DL (ref 3.6–5.1)
ALBUMIN/GLOB SERPL: 1.4 (CALC) (ref 1–2.5)
ALP SERPL-CCNC: 116 U/L (ref 33–130)
ALT SERPL-CCNC: 22 U/L (ref 6–29)
AST SERPL-CCNC: 39 U/L (ref 10–35)
BILIRUB SERPL-MCNC: 2 MG/DL (ref 0.2–1.2)
BUN SERPL-MCNC: 18 MG/DL (ref 7–25)
BUN/CREATININE RATIO: ABNORMAL (CALC) (ref 6–22)
CALCIUM SERPL-MCNC: 9.1 MG/DL (ref 8.6–10.4)
CHLORIDE SERPLBLD-SCNC: 111 MMOL/L (ref 98–110)
CHOLEST SERPL-MCNC: 115 MG/DL
CHOLEST/HDLC SERPL: 2.5 (CALC)
CO2 SERPL-SCNC: 21 MMOL/L (ref 20–31)
CREAT SERPL-MCNC: 0.8 MG/DL (ref 0.6–0.88)
EGFR AFRICAN AMERICAN - QUEST: 79 ML/MIN/1.73M2
GFR SERPL CREATININE-BSD FRML MDRD: 68 ML/MIN/1.73M2
GLOBULIN, CALCULATED - QUEST: 2.4 G/DL (CALC) (ref 1.9–3.7)
GLUCOSE - QUEST: 195 MG/DL (ref 65–99)
HDLC SERPL-MCNC: 46 MG/DL
LDLC SERPL CALC-MCNC: 53 MG/DL (CALC)
NONHDLC SERPL-MCNC: 69 MG/DL (CALC)
POTASSIUM SERPL-SCNC: 3.4 MMOL/L (ref 3.5–5.3)
PROT SERPL-MCNC: 5.7 G/DL (ref 6.1–8.1)
SODIUM SERPL-SCNC: 143 MMOL/L (ref 135–146)
TRIGL SERPL-MCNC: 84 MG/DL

## 2017-08-23 ENCOUNTER — TELEPHONE (OUTPATIENT)
Dept: FAMILY MEDICINE | Facility: CLINIC | Age: 82
End: 2017-08-23

## 2017-08-23 NOTE — TELEPHONE ENCOUNTER
Spoke to Evy and she said that she has already spoken to Evy.  They made the change d/t the cirrhosis of the liver.  She called the GI dept and they left a message with the care coordinator so they are waiting on a callback from her once she has spoken with the provider so once they call her back, she will contact us to let us know what they suggest.

## 2017-08-23 NOTE — TELEPHONE ENCOUNTER
Evy called the CS line stating that  Dr. Samano has put her on an additional prescription of Lasix for the edema.     Dr. Samano can be reached at:  [phone]    Please advise.     EVELYN Galvan ([facility])

## 2017-08-23 NOTE — TELEPHONE ENCOUNTER
Evy called to say she saw LES yesterday but another provider made some medication changes and her feet are all swollen as well as her legs.  She would like to speak to Dr Chapin    Patient's phone #[phone]

## 2017-08-23 NOTE — TELEPHONE ENCOUNTER
She needs to call her GI specialists and ask for his nurse to review his medication changes and her response. I do not think I should make any changes without their direction due to her liver cirrhosis.

## 2017-08-24 RX ORDER — FUROSEMIDE 20 MG
20 TABLET ORAL DAILY
Qty: 30 TABLET | Refills: 0 | COMMUNITY
Start: 2017-08-24 | End: 2018-01-23

## 2017-08-24 NOTE — TELEPHONE ENCOUNTER
Attempted to reach the Pt but got her  VM.   Asked Pt to call the CS line with the dose of the new medication that she is taking so we can add it to her records.     Jaz.EVELYN Navarrete ([facility])

## 2017-08-24 NOTE — TELEPHONE ENCOUNTER
Spoke to Evy - the Lasix is 20 mg tab qd.  She was switched to Carvedilol from Metoprolol and after taking the carvedilol for 2 day, she then noticed the swelling of her feet.  I did let her know to contact Dr Samano to discuss.

## 2017-09-05 ENCOUNTER — TRANSFERRED RECORDS (OUTPATIENT)
Dept: FAMILY MEDICINE | Facility: CLINIC | Age: 82
End: 2017-09-05

## 2017-12-20 DIAGNOSIS — E11.49 DIABETES MELLITUS TYPE 2 WITH NEUROLOGICAL MANIFESTATIONS (H): ICD-10-CM

## 2017-12-20 NOTE — TELEPHONE ENCOUNTER
Evy Song is requesting a refill of:    Pending Prescriptions:                       Disp   Refills    ONETOUCH ULTRA test strip [Pharmacy Med N*100 ea*0            Sig: USE TO TEST BLOOD SUGARS ONCE DAILY AS DIRECTED    Please close encounter if RX was sent. Thanks, Brittni

## 2017-12-21 ENCOUNTER — TELEPHONE (OUTPATIENT)
Dept: FAMILY MEDICINE | Facility: CLINIC | Age: 82
End: 2017-12-21

## 2017-12-21 NOTE — TELEPHONE ENCOUNTER
Received a fax from the patient's pharmacy stating that the patient stopped the Coreg and would like to go back on the Lopressor.    Pharm listed - please let me know what you want to do?

## 2017-12-21 NOTE — TELEPHONE ENCOUNTER
Have pharmacy direct this to her GI specialists who is monitoring her BP medication and working with her cirrhosis diagnosis.  I will not be filling or making a change.

## 2018-01-01 ENCOUNTER — TRANSFERRED RECORDS (OUTPATIENT)
Dept: FAMILY MEDICINE | Facility: CLINIC | Age: 83
End: 2018-01-01

## 2018-01-01 ENCOUNTER — TELEPHONE (OUTPATIENT)
Dept: FAMILY MEDICINE | Facility: CLINIC | Age: 83
End: 2018-01-01

## 2018-01-01 ENCOUNTER — MEDICAL CORRESPONDENCE (OUTPATIENT)
Dept: HEALTH INFORMATION MANAGEMENT | Facility: CLINIC | Age: 83
End: 2018-01-01

## 2018-01-01 ENCOUNTER — OFFICE VISIT (OUTPATIENT)
Dept: FAMILY MEDICINE | Facility: CLINIC | Age: 83
End: 2018-01-01

## 2018-01-01 VITALS
DIASTOLIC BLOOD PRESSURE: 58 MMHG | TEMPERATURE: 97.9 F | OXYGEN SATURATION: 95 % | BODY MASS INDEX: 31.66 KG/M2 | WEIGHT: 181.6 LBS | HEART RATE: 72 BPM | RESPIRATION RATE: 16 BRPM | SYSTOLIC BLOOD PRESSURE: 124 MMHG

## 2018-01-01 VITALS
OXYGEN SATURATION: 94 % | HEART RATE: 104 BPM | HEIGHT: 64 IN | WEIGHT: 180 LBS | RESPIRATION RATE: 16 BRPM | BODY MASS INDEX: 30.73 KG/M2 | DIASTOLIC BLOOD PRESSURE: 68 MMHG | SYSTOLIC BLOOD PRESSURE: 126 MMHG | TEMPERATURE: 97.7 F

## 2018-01-01 VITALS
SYSTOLIC BLOOD PRESSURE: 116 MMHG | TEMPERATURE: 97.9 F | OXYGEN SATURATION: 90 % | RESPIRATION RATE: 16 BRPM | DIASTOLIC BLOOD PRESSURE: 58 MMHG | BODY MASS INDEX: 28.89 KG/M2 | HEART RATE: 69 BPM | HEIGHT: 64 IN | WEIGHT: 169.2 LBS

## 2018-01-01 VITALS
HEART RATE: 112 BPM | OXYGEN SATURATION: 88 % | TEMPERATURE: 98.3 F | WEIGHT: 180 LBS | BODY MASS INDEX: 30.73 KG/M2 | SYSTOLIC BLOOD PRESSURE: 90 MMHG | HEIGHT: 64 IN | RESPIRATION RATE: 16 BRPM | DIASTOLIC BLOOD PRESSURE: 58 MMHG

## 2018-01-01 DIAGNOSIS — K76.0 FATTY LIVER DISEASE, NONALCOHOLIC: ICD-10-CM

## 2018-01-01 DIAGNOSIS — I10 ESSENTIAL HYPERTENSION, BENIGN: ICD-10-CM

## 2018-01-01 DIAGNOSIS — H40.10X0 OPEN-ANGLE GLAUCOMA OF BOTH EYES, UNSPECIFIED GLAUCOMA STAGE, UNSPECIFIED OPEN-ANGLE GLAUCOMA TYPE: ICD-10-CM

## 2018-01-01 DIAGNOSIS — R82.90 BAD ODOR OF URINE: ICD-10-CM

## 2018-01-01 DIAGNOSIS — E78.2 MIXED HYPERLIPIDEMIA: ICD-10-CM

## 2018-01-01 DIAGNOSIS — K21.9 GASTROESOPHAGEAL REFLUX DISEASE WITHOUT ESOPHAGITIS: ICD-10-CM

## 2018-01-01 DIAGNOSIS — N30.00 ACUTE CYSTITIS WITHOUT HEMATURIA: Primary | ICD-10-CM

## 2018-01-01 DIAGNOSIS — R30.0 DYSURIA: ICD-10-CM

## 2018-01-01 DIAGNOSIS — J84.112 IPF (IDIOPATHIC PULMONARY FIBROSIS) (H): ICD-10-CM

## 2018-01-01 DIAGNOSIS — R82.90 ABNORMAL URINE ODOR: ICD-10-CM

## 2018-01-01 DIAGNOSIS — E11.49 DIABETES MELLITUS TYPE 2 WITH NEUROLOGICAL MANIFESTATIONS (H): Primary | ICD-10-CM

## 2018-01-01 DIAGNOSIS — K74.60 CIRRHOSIS, NON-ALCOHOLIC (H): ICD-10-CM

## 2018-01-01 DIAGNOSIS — N30.01 ACUTE CYSTITIS WITH HEMATURIA: Primary | ICD-10-CM

## 2018-01-01 DIAGNOSIS — E66.811 OBESITY (BMI 30.0-34.9): ICD-10-CM

## 2018-01-01 DIAGNOSIS — R00.0 TACHYCARDIA: Primary | ICD-10-CM

## 2018-01-01 DIAGNOSIS — S39.011A STRAIN OF ABDOMINAL MUSCLE, INITIAL ENCOUNTER: ICD-10-CM

## 2018-01-01 DIAGNOSIS — Z76.0 ENCOUNTER FOR MEDICATION REFILL: ICD-10-CM

## 2018-01-01 DIAGNOSIS — R82.90 ABNORMAL URINE ODOR: Primary | ICD-10-CM

## 2018-01-01 DIAGNOSIS — G63 POLYNEUROPATHY ASSOCIATED WITH UNDERLYING DISEASE (H): ICD-10-CM

## 2018-01-01 DIAGNOSIS — R39.11 URINARY HESITANCY: ICD-10-CM

## 2018-01-01 DIAGNOSIS — R60.0 PEDAL EDEMA: ICD-10-CM

## 2018-01-01 LAB
% GRANULOCYTES: 67.6 %
ALBUMIN (URINE): ABNORMAL MG/DL
ALBUMIN SERPL-MCNC: 3.1 G/DL (ref 3.6–5.1)
ALBUMIN SERPL-MCNC: 3.1 G/DL (ref 3.6–5.1)
ALBUMIN SERPL-MCNC: 3.4 G/DL (ref 3.6–5.1)
ALBUMIN URINE MG/G CR: <30 MG/G CREATININE
ALBUMIN URINE MG/SPEC: 10
ALBUMIN/GLOB SERPL: 1.4 (CALC) (ref 1–2.5)
ALBUMIN/GLOB SERPL: 1.4 (CALC) (ref 1–2.5)
ALBUMIN/GLOB SERPL: 1.5 (CALC) (ref 1–2.5)
ALP SERPL-CCNC: 118 U/L (ref 33–130)
ALP SERPL-CCNC: 137 U/L (ref 33–130)
ALP SERPL-CCNC: 162 U/L (ref 33–130)
ALT SERPL-CCNC: 22 U/L (ref 6–29)
ALT SERPL-CCNC: 23 U/L (ref 6–29)
ALT SERPL-CCNC: 32 U/L (ref 6–29)
APPEARANCE UR: ABNORMAL
APPEARANCE UR: CLEAR
AST SERPL-CCNC: 40 U/L (ref 10–35)
AST SERPL-CCNC: 47 U/L (ref 10–35)
AST SERPL-CCNC: 58 U/L (ref 10–35)
BACTERIA, UR: ABNORMAL
BILIRUB SERPL-MCNC: 2.3 MG/DL (ref 0.2–1.2)
BILIRUB SERPL-MCNC: 2.9 MG/DL (ref 0.2–1.2)
BILIRUB SERPL-MCNC: 3.4 MG/DL (ref 0.2–1.2)
BILIRUB UR QL: ABNORMAL
BUN SERPL-MCNC: 15 MG/DL (ref 7–25)
BUN SERPL-MCNC: 16 MG/DL (ref 7–25)
BUN SERPL-MCNC: 17 MG/DL (ref 7–25)
BUN/CREATININE RATIO: 17 (CALC) (ref 6–22)
BUN/CREATININE RATIO: ABNORMAL (CALC) (ref 6–22)
BUN/CREATININE RATIO: ABNORMAL (CALC) (ref 6–22)
CALCIUM SERPL-MCNC: 9 MG/DL (ref 8.6–10.4)
CALCIUM SERPL-MCNC: 9.4 MG/DL (ref 8.6–10.4)
CALCIUM SERPL-MCNC: 9.7 MG/DL (ref 8.6–10.4)
CASTS/LPF: ABNORMAL
CHLORIDE SERPLBLD-SCNC: 107 MMOL/L (ref 98–110)
CHLORIDE SERPLBLD-SCNC: 111 MMOL/L (ref 98–110)
CHLORIDE SERPLBLD-SCNC: 112 MMOL/L (ref 98–110)
CHOLEST SERPL-MCNC: 113 MG/DL
CHOLEST SERPL-MCNC: 126 MG/DL
CHOLEST/HDLC SERPL: 1.4 (CALC)
CHOLEST/HDLC SERPL: 2.1 (CALC)
CO2 SERPL-SCNC: 17 MMOL/L (ref 20–31)
CO2 SERPL-SCNC: 20 MMOL/L (ref 20–32)
CO2 SERPL-SCNC: 21 MMOL/L (ref 20–31)
COLOR UR: ABNORMAL
COLOR UR: ABNORMAL
COLOR UR: YELLOW
COLOR UR: YELLOW
CREAT SERPL-MCNC: 0.73 MG/DL (ref 0.6–0.88)
CREAT SERPL-MCNC: 0.78 MG/DL (ref 0.6–0.88)
CREAT SERPL-MCNC: 1.03 MG/DL (ref 0.6–0.88)
CREATININE URINE: 50
EGFR AFRICAN AMERICAN - QUEST: 58 ML/MIN/1.73M2
EGFR AFRICAN AMERICAN - QUEST: 81 ML/MIN/1.73M2
EGFR AFRICAN AMERICAN - QUEST: 88 ML/MIN/1.73M2
EP/HPF: ABNORMAL
GFR SERPL CREATININE-BSD FRML MDRD: 50 ML/MIN/1.73M2
GFR SERPL CREATININE-BSD FRML MDRD: 70 ML/MIN/1.73M2
GFR SERPL CREATININE-BSD FRML MDRD: 76 ML/MIN/1.73M2
GLOBULIN, CALCULATED - QUEST: 2.1 G/DL (CALC) (ref 1.9–3.7)
GLOBULIN, CALCULATED - QUEST: 2.2 G/DL (CALC) (ref 1.9–3.7)
GLOBULIN, CALCULATED - QUEST: 2.5 G/DL (CALC) (ref 1.9–3.7)
GLUCOSE - QUEST: 103 MG/DL (ref 65–99)
GLUCOSE - QUEST: 127 MG/DL (ref 65–99)
GLUCOSE - QUEST: 135 MG/DL (ref 65–99)
GLUCOSE URINE: ABNORMAL MG/DL
HBA1C MFR BLD: 5.8 % (ref 4–7)
HBA1C MFR BLD: 6.4 % (ref 4–7)
HCT VFR BLD AUTO: 37.1 % (ref 35–47)
HDLC SERPL-MCNC: 54 MG/DL
HDLC SERPL-MCNC: 87 MG/DL
HEMOGLOBIN: 12.1 G/DL (ref 11.7–15.7)
HGB UR QL: ABNORMAL
KETONES UR QL: ABNORMAL MG/DL
LDLC SERPL CALC-MCNC: 26 MG/DL (CALC)
LDLC SERPL CALC-MCNC: 45 MG/DL (CALC)
LEUKOCYTE ESTERASE - QUEST: ABNORMAL
LYMPHOCYTES NFR BLD AUTO: 22 %
MCH RBC QN AUTO: 28.5 PG (ref 26–33)
MCHC RBC AUTO-ENTMCNC: 32.6 G/DL (ref 31–36)
MCV RBC AUTO: 87.2 FL (ref 78–100)
MISC.: ABNORMAL
MONOCYTES NFR BLD AUTO: 10.4 %
NITRITE UR QL STRIP: ABNORMAL
NONHDLC SERPL-MCNC: 39 MG/DL (CALC)
NONHDLC SERPL-MCNC: 59 MG/DL (CALC)
PH UR STRIP: 5.5 PH (ref 5–7)
PH UR STRIP: 6 PH (ref 5–7)
PH UR STRIP: 6 PH (ref 5–7)
PH UR STRIP: 7 PH (ref 5–7)
PLATELET COUNT - QUEST: 174 10^9/L (ref 150–375)
POTASSIUM SERPL-SCNC: 2.6 MMOL/L (ref 3.5–5.3)
POTASSIUM SERPL-SCNC: 3.7 MMOL/L (ref 3.5–5.3)
POTASSIUM SERPL-SCNC: 4.2 MMOL/L (ref 3.5–5.3)
PROT SERPL-MCNC: 5.2 G/DL (ref 6.1–8.1)
PROT SERPL-MCNC: 5.3 G/DL (ref 6.1–8.1)
PROT SERPL-MCNC: 5.9 G/DL (ref 6.1–8.1)
RBC # BLD AUTO: 4.25 10*12/L (ref 3.8–5.2)
RBC, UR MICRO: >100 (ref ?–2)
RBC, UR MICRO: ABNORMAL (ref ?–2)
SODIUM SERPL-SCNC: 142 MMOL/L (ref 135–146)
SODIUM SERPL-SCNC: 142 MMOL/L (ref 135–146)
SODIUM SERPL-SCNC: 145 MMOL/L (ref 135–146)
SP. GRAVITY: 1.01
SP. GRAVITY: 1.01
SP. GRAVITY: 1.02
SP. GRAVITY: 1.02
TRIGL SERPL-MCNC: 46 MG/DL
TRIGL SERPL-MCNC: 64 MG/DL
TSH SERPL-ACNC: 3.38 MIU/L (ref 0.4–4.5)
URINE VOIDED CULTURE: ABNORMAL
URINE VOIDED CULTURE: NORMAL
UROBILINOGEN UR QL STRIP: 0.2 EU/DL (ref 0.2–1)
UROBILINOGEN UR QL STRIP: 0.2 EU/DL (ref 0.2–1)
UROBILINOGEN UR QL STRIP: 1 EU/DL (ref 0.2–1)
UROBILINOGEN UR QL STRIP: 1 EU/DL (ref 0.2–1)
WBC # BLD AUTO: 9.6 10*9/L (ref 4–11)
WBC, UR MICRO: >100 (ref ?–2)
WBC, UR MICRO: ABNORMAL (ref ?–2)

## 2018-01-01 PROCEDURE — 99214 OFFICE O/P EST MOD 30 MIN: CPT | Performed by: FAMILY MEDICINE

## 2018-01-01 PROCEDURE — 87186 SC STD MICRODIL/AGAR DIL: CPT | Mod: 90 | Performed by: FAMILY MEDICINE

## 2018-01-01 PROCEDURE — 83036 HEMOGLOBIN GLYCOSYLATED A1C: CPT | Performed by: FAMILY MEDICINE

## 2018-01-01 PROCEDURE — 71046 X-RAY EXAM CHEST 2 VIEWS: CPT | Performed by: FAMILY MEDICINE

## 2018-01-01 PROCEDURE — 87086 URINE CULTURE/COLONY COUNT: CPT | Mod: 90 | Performed by: FAMILY MEDICINE

## 2018-01-01 PROCEDURE — 81001 URINALYSIS AUTO W/SCOPE: CPT | Performed by: FAMILY MEDICINE

## 2018-01-01 PROCEDURE — 87088 URINE BACTERIA CULTURE: CPT | Mod: 90 | Performed by: FAMILY MEDICINE

## 2018-01-01 PROCEDURE — 99213 OFFICE O/P EST LOW 20 MIN: CPT | Performed by: FAMILY MEDICINE

## 2018-01-01 PROCEDURE — 80053 COMPREHEN METABOLIC PANEL: CPT | Mod: 90 | Performed by: FAMILY MEDICINE

## 2018-01-01 PROCEDURE — 82043 UR ALBUMIN QUANTITATIVE: CPT | Performed by: FAMILY MEDICINE

## 2018-01-01 PROCEDURE — 84443 ASSAY THYROID STIM HORMONE: CPT | Mod: 90 | Performed by: FAMILY MEDICINE

## 2018-01-01 PROCEDURE — 36415 COLL VENOUS BLD VENIPUNCTURE: CPT | Performed by: FAMILY MEDICINE

## 2018-01-01 PROCEDURE — 87077 CULTURE AEROBIC IDENTIFY: CPT | Mod: 90 | Performed by: FAMILY MEDICINE

## 2018-01-01 PROCEDURE — 85025 COMPLETE CBC W/AUTO DIFF WBC: CPT | Performed by: FAMILY MEDICINE

## 2018-01-01 PROCEDURE — 80061 LIPID PANEL: CPT | Mod: 90 | Performed by: FAMILY MEDICINE

## 2018-01-01 PROCEDURE — 93000 ELECTROCARDIOGRAM COMPLETE: CPT | Performed by: FAMILY MEDICINE

## 2018-01-01 RX ORDER — NEOMYCIN SULFATE, POLYMYXIN B SULFATE AND DEXAMETHASONE 3.5; 10000; 1 MG/ML; [USP'U]/ML; MG/ML
SUSPENSION/ DROPS OPHTHALMIC
Refills: 1 | COMMUNITY
Start: 2018-01-01 | End: 2019-01-01

## 2018-01-01 RX ORDER — METFORMIN HCL 500 MG
2000 TABLET, EXTENDED RELEASE 24 HR ORAL
Qty: 360 TABLET | Refills: 1 | Status: SHIPPED | OUTPATIENT
Start: 2018-01-01 | End: 2019-01-01

## 2018-01-01 RX ORDER — LOSARTAN POTASSIUM 50 MG/1
25 TABLET ORAL DAILY
Qty: 45 TABLET | Refills: 1 | Status: SHIPPED | OUTPATIENT
Start: 2018-01-01 | End: 2018-01-01

## 2018-01-01 RX ORDER — SPIRONOLACTONE 50 MG/1
50 TABLET, FILM COATED ORAL DAILY
Status: ON HOLD | COMMUNITY
Start: 2018-01-01 | End: 2019-01-01

## 2018-01-01 RX ORDER — FUROSEMIDE 20 MG
20 TABLET ORAL DAILY
COMMUNITY
Start: 2018-01-01 | End: 2019-01-01

## 2018-01-01 RX ORDER — CEPHALEXIN 500 MG/1
500 CAPSULE ORAL 2 TIMES DAILY
Qty: 20 CAPSULE | Refills: 0 | Status: SHIPPED | OUTPATIENT
Start: 2018-01-01 | End: 2018-01-01

## 2018-01-01 RX ORDER — SPIRONOLACTONE 25 MG/1
TABLET ORAL
Qty: 30 TABLET | Refills: 0 | OUTPATIENT
Start: 2018-01-01

## 2018-01-01 RX ORDER — AMOXICILLIN AND CLAVULANATE POTASSIUM 500; 125 MG/1; MG/1
1 TABLET, FILM COATED ORAL 2 TIMES DAILY
Qty: 14 TABLET | Refills: 0 | Status: SHIPPED | OUTPATIENT
Start: 2018-01-01 | End: 2019-01-01

## 2018-01-01 RX ORDER — POTASSIUM CHLORIDE 750 MG/1
10 TABLET, EXTENDED RELEASE ORAL DAILY
Qty: 90 TABLET | Refills: 1 | Status: SHIPPED | OUTPATIENT
Start: 2018-01-01 | End: 2018-01-01

## 2018-01-01 RX ORDER — POTASSIUM CHLORIDE 750 MG/1
20 TABLET, EXTENDED RELEASE ORAL DAILY
Qty: 180 TABLET | Refills: 1 | Status: SHIPPED | OUTPATIENT
Start: 2018-01-01 | End: 2019-01-01

## 2018-01-01 RX ORDER — FLUTICASONE FUROATE 100 UG/1
100 POWDER RESPIRATORY (INHALATION) DAILY
Refills: 12 | COMMUNITY
Start: 2018-02-10

## 2018-01-01 RX ORDER — METOPROLOL TARTRATE 25 MG/1
25 TABLET, FILM COATED ORAL 2 TIMES DAILY
Refills: 0 | COMMUNITY
Start: 2018-01-01 | End: 2018-01-01

## 2018-01-01 RX ORDER — METOPROLOL TARTRATE 25 MG/1
25 TABLET, FILM COATED ORAL AT BEDTIME
Status: ON HOLD | COMMUNITY
Start: 2018-01-01 | End: 2019-01-01

## 2018-01-01 RX ORDER — SIMVASTATIN 20 MG
20 TABLET ORAL AT BEDTIME
Qty: 90 TABLET | Refills: 1 | Status: SHIPPED | OUTPATIENT
Start: 2018-01-01

## 2018-01-01 RX ORDER — AMOXICILLIN AND CLAVULANATE POTASSIUM 500; 125 MG/1; MG/1
1 TABLET, FILM COATED ORAL 2 TIMES DAILY
Qty: 20 TABLET | Refills: 0 | Status: SHIPPED | OUTPATIENT
Start: 2018-01-01 | End: 2018-01-01

## 2018-01-01 RX ORDER — SULFAMETHOXAZOLE/TRIMETHOPRIM 800-160 MG
1 TABLET ORAL 2 TIMES DAILY
Qty: 14 TABLET | Refills: 0 | Status: SHIPPED | OUTPATIENT
Start: 2018-01-01 | End: 2018-01-01

## 2018-01-01 RX ORDER — METFORMIN HCL 500 MG
2000 TABLET, EXTENDED RELEASE 24 HR ORAL
Qty: 360 TABLET | Refills: 1 | Status: SHIPPED | OUTPATIENT
Start: 2018-01-01 | End: 2018-01-01

## 2018-01-23 ENCOUNTER — OFFICE VISIT (OUTPATIENT)
Dept: FAMILY MEDICINE | Facility: CLINIC | Age: 83
End: 2018-01-23

## 2018-01-23 VITALS
RESPIRATION RATE: 16 BRPM | WEIGHT: 181.4 LBS | HEIGHT: 64 IN | HEART RATE: 130 BPM | DIASTOLIC BLOOD PRESSURE: 74 MMHG | SYSTOLIC BLOOD PRESSURE: 112 MMHG | TEMPERATURE: 98 F | BODY MASS INDEX: 30.97 KG/M2

## 2018-01-23 DIAGNOSIS — L30.8 OTHER ECZEMA: ICD-10-CM

## 2018-01-23 DIAGNOSIS — Z23 NEED FOR VACCINATION: ICD-10-CM

## 2018-01-23 DIAGNOSIS — M62.838 MUSCLE SPASM: Primary | ICD-10-CM

## 2018-01-23 DIAGNOSIS — T50.905A ADVERSE EFFECT OF DRUG, INITIAL ENCOUNTER: ICD-10-CM

## 2018-01-23 DIAGNOSIS — E11.49 DIABETES MELLITUS TYPE 2 WITH NEUROLOGICAL MANIFESTATIONS (H): ICD-10-CM

## 2018-01-23 LAB
% GRANULOCYTES: 64.6 %
HBA1C MFR BLD: 6.4 % (ref 4–7)
HCT VFR BLD AUTO: 43.6 % (ref 35–47)
HEMOGLOBIN: 13.5 G/DL (ref 11.7–15.7)
LYMPHOCYTES NFR BLD AUTO: 25 %
MCH RBC QN AUTO: 26.6 PG (ref 26–33)
MCHC RBC AUTO-ENTMCNC: 31 G/DL (ref 31–36)
MCV RBC AUTO: 85.8 FL (ref 78–100)
MONOCYTES NFR BLD AUTO: 10.4 %
PLATELET COUNT - QUEST: 243 10^9/L (ref 150–375)
RBC # BLD AUTO: 5.08 10*12/L (ref 3.8–5.2)
WBC # BLD AUTO: 10.8 10*9/L (ref 4–11)

## 2018-01-23 PROCEDURE — 85025 COMPLETE CBC W/AUTO DIFF WBC: CPT | Performed by: FAMILY MEDICINE

## 2018-01-23 PROCEDURE — 84132 ASSAY OF SERUM POTASSIUM: CPT | Mod: 90 | Performed by: FAMILY MEDICINE

## 2018-01-23 PROCEDURE — G0008 ADMIN INFLUENZA VIRUS VAC: HCPCS | Performed by: FAMILY MEDICINE

## 2018-01-23 PROCEDURE — 90686 IIV4 VACC NO PRSV 0.5 ML IM: CPT | Performed by: FAMILY MEDICINE

## 2018-01-23 PROCEDURE — 99214 OFFICE O/P EST MOD 30 MIN: CPT | Mod: 25 | Performed by: FAMILY MEDICINE

## 2018-01-23 PROCEDURE — 90715 TDAP VACCINE 7 YRS/> IM: CPT | Performed by: FAMILY MEDICINE

## 2018-01-23 PROCEDURE — 36415 COLL VENOUS BLD VENIPUNCTURE: CPT | Performed by: FAMILY MEDICINE

## 2018-01-23 PROCEDURE — 83036 HEMOGLOBIN GLYCOSYLATED A1C: CPT | Performed by: FAMILY MEDICINE

## 2018-01-23 PROCEDURE — 90471 IMMUNIZATION ADMIN: CPT | Mod: 59 | Performed by: FAMILY MEDICINE

## 2018-01-23 RX ORDER — FUROSEMIDE 20 MG
10 TABLET ORAL DAILY
COMMUNITY
Start: 2018-01-23 | End: 2018-01-01

## 2018-01-23 RX ORDER — HYDROCORTISONE VALERATE CREAM 2 MG/G
CREAM TOPICAL
Qty: 45 G | Refills: 0 | Status: SHIPPED | OUTPATIENT
Start: 2018-01-23

## 2018-01-23 RX ORDER — POTASSIUM CHLORIDE 750 MG/1
10 TABLET, EXTENDED RELEASE ORAL DAILY
Qty: 30 TABLET | Refills: 1 | Status: SHIPPED | OUTPATIENT
Start: 2018-01-23 | End: 2018-01-01

## 2018-01-23 NOTE — NURSING NOTE
Ankle/Brachial Index (LEATHA)  Right - .76  Left - .94    Overall LEATHA - .76    Interpretation  0.96 or Above -  Normal  0.71 - 0.95      -  Mild Obstruction  0.31 - 0.70      -  Moderate Obstruction  0.00 - 0.30      -  Severe Obstruction

## 2018-01-23 NOTE — MR AVS SNAPSHOT
"              After Visit Summary   1/23/2018    Evy Song    MRN: [id number]           Patient Information     Date Of Birth          6/23/1934        Visit Information        Provider Department      1/23/2018 3:45 PM Liz Chapin MD [facility]        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 directly at [phone].  Normal or non-critical lab and imaging results will be communicated to you by MyChart, letter or phone within 4 business days after the clinic has received the results. If you do not hear from us within 7 days, please contact the clinic through MyChart or phone. If you have a critical or abnormal lab result, we will notify you by phone as soon as possible.  Submit refill requests through NASOFORM or call your pharmacy and they will forward the refill request to us. Please allow 3 business days for your refill to be completed.          Additional Information About Your Visit        Care EveryWhere ID     This is your Care EveryWhere ID. This could be used by other organizations to access your [place] medical records  [id number]        Your Vitals Were     Pulse Temperature Respirations Height Last Period Breastfeeding?    130 98  F (36.7  C) (Oral) 16 1.626 m (5' 4\") (LMP Unknown) No    BMI (Body Mass Index)                   31.14 kg/m2            " Blood Pressure from Last 3 Encounters:   01/23/18 112/74   08/21/17 138/86   02/22/17 120/80    Weight from Last 3 Encounters:   01/23/18 82.3 kg (181 lb 6.4 oz)   08/21/17 88.9 kg (196 lb)   02/22/17 84.8 kg (187 lb)              We Performed the Following     CL AFF HEMOGRAM/PLATE/DIFF (BFP)     HC FLU VAC PRESRV FREE QUAD SPLIT VIR 3+YRS IM     Hemoglobin A1c (BFP)     Potassium (QUEST)     TDAP VACCINE (BOOSTRIX)     VACCINE ADMINISTRATION, EACH ADDITIONAL     VACCINE ADMINISTRATION, INITIAL     VENOUS COLLECTION          Today's Medication Changes          These changes are accurate as of: 1/23/18  5:14 PM.  If you have any questions, ask your nurse or doctor.               Start taking these medicines.        Dose/Directions    hydrocortisone 0.2 % cream   Commonly known as:  WESTCORT   Used for:  Other eczema   Started by:  Liz Chapin MD        Apply sparingly to affected area as needed up to twice daily   Quantity:  45 g   Refills:  0       potassium chloride SA 10 MEQ CR tablet   Commonly known as:  K-DUR/KLOR-CON M   Used for:  Muscle spasm   Started by:  Liz Chapin MD        Dose:  10 mEq   Take 1 tablet (10 mEq) by mouth daily   Quantity:  30 tablet   Refills:  1         These medicines have changed or have updated prescriptions.        Dose/Directions    fluticasone 50 MCG/ACT spray   Commonly known as:  FLONASE   This may have changed:    - when to take this  - reasons to take this   Used for:  Chronic rhinitis        Dose:  1-2 spray   Spray 1-2 sprays into both nostrils daily   Quantity:  16 g   Refills:  3       LASIX 20 MG tablet   This may have changed:  how much to take   Generic drug:  furosemide   Changed by:  Liz Chapin MD        Dose:  10 mg   Take 0.5 tablets (10 mg) by mouth daily   Refills:  0            Where to get your medicines      These medications were sent to [id number] - [place], [place] - [address]  [address] [place] [address] [address], [facility] [address]      Phone:  [phone]     hydrocortisone 0.2 % cream    potassium chloride SA 10 MEQ CR tablet                Primary Care Provider Office Phone # Fax #    Liz Chapin [phone]       Johnna B[address]INA [address] [address]        Equal Access to Services     HILARY GAR : Hadii aad ku hadasho Soomaali, waaxda luqadaha, qaybta kaalmada adeegyada, mateo dickinson carlos alberton ferny basilmelyssa hooks . So [facility] [phone].    ATENCIÓN: Si habla español, tiene a ta disposición servicios gratuitos de asistencia lingüística. Llame al [phone].    We comply with applicable federal civil rights laws and [place] laws. We do not discriminate on the basis of race, color, national origin, age, disability, sex, sexual orientation, or gender identity.            Thank you!     Thank you for choosing [facility]  for your care. Our goal is always to provide you with excellent care. Hearing back from our patients is one way we can continue to improve our services. Please take a few minutes to complete the written survey that you may receive in the mail after your visit with us. Thank you!             Your Updated Medication List - Protect others around you: Learn how to safely use, store and throw away your medicines at www.disposemymeds.org.          This list is accurate as of: 1/23/18  5:14 PM.  Always use your most recent med list.                   Brand Name Dispense Instructions for use Diagnosis    ASMANEX  MCG/ACT Aero   Generic drug:  Mometasone Furoate       IPF (idiopathic pulmonary fibrosis) (H)       aspirin 81 MG tablet     0    1 tab po QD (Once per day)    Essential hypertension, benign       AZOPT 1 % ophthalmic susp   Generic drug:  brinzolamide      1 DROP BOTH EYES BID (am and hs)        calcium polycarbophil 625 MG tablet    FIBERCON    120 tablet    Take 4 tablets (2,500 mg) by mouth daily    Irritable bowel syndrome       dorzolamide 2 % ophthalmic  solution    TRUSOPT      Low-tension glaucoma of both eyes, unspecified glaucoma stage       EPINEPHrine 0.3 MG/0.3ML injection 2-pack    EPIPEN 2-LALA    2 each    Inject 0.3 mLs (0.3 mg) into the muscle once as needed for anaphylaxis    Sting of hornets, wasps, and bees as the cause of poisoning and toxic reactions(E905.3)       ferrous gluconate 324 (38 FE) MG tablet    FERGON     Take 1 tablet (324 mg) by mouth daily (with breakfast)    Other iron deficiency anemia, Restless leg syndrome       fluticasone 50 MCG/ACT spray    FLONASE    16 g    Spray 1-2 sprays into both nostrils daily    Chronic rhinitis       hydrocortisone 0.2 % cream    WESTCORT    45 g    Apply sparingly to affected area as needed up to twice daily    Other eczema       LASIX 20 MG tablet   Generic drug:  furosemide      Take 0.5 tablets (10 mg) by mouth daily        losartan 50 MG tablet    COZAAR     Take 0.5 tablets (25 mg) by mouth daily    Essential hypertension, benign, 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)       LYRICA 75 MG capsule   Generic drug:  pregabalin      Take 150 mg by mouth nightly as needed        metFORMIN 500 MG 24 hr tablet    GLUCOPHAGE-XR    360 tablet    Take 4 tablets (2,000 mg) by mouth daily (with dinner)    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), Obesity (BMI 30.0-34.9)       ONETOUCH ULTRA test strip   Generic drug:  blood glucose monitoring     100 each    USE TO TEST BLOOD SUGARS ONCE DAILY AS DIRECTED    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)       potassium chloride SA 10 MEQ CR tablet    K-DUR/KLOR-CON M    30 tablet    Take 1 tablet (10 mEq) by mouth daily    Muscle spasm       PROAIR  (90 BASE) MCG/ACT Inhaler   Generic drug:  albuterol      INHALE 2 PUFF BY INHALATION ROUTE EVERY 4 - 6 HOURS AS NEEDED        RESTASIS 0.05 % ophthalmic emulsion   Generic drug:  cycloSPORINE      Place 1 drop into both eyes as needed         rOPINIRole 0.25 MG tablet    REQUIP     Take 0.25 mg by mouth as needed    Restless leg syndrome       simvastatin 20 MG tablet    ZOCOR    90 tablet    Take 1 tablet (20 mg) by mouth At Bedtime    Mixed hyperlipidemia, Obesity (BMI 30.0-34.9), 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

## 2018-01-23 NOTE — PROGRESS NOTES
SUBJECTIVE: 83 year old female complaining of bilateral calf muscle spasms worse in the middle of the night for 2 week(s).   The patient describes not eating well due to poor appetite and working on weight loss, did not start a potassium supplement as discussed at her last visit but continues furosemide 20 mgm daily from her GI specialists, blood glucose levels alright, and also has back her skin itching like bug bites that she had last 1/2017 ( eczema diagnosis in chart).   The patient denies a history of ankle swelling, neurological weakness or radicular pain, skin changes or injury.   Smoking history: No.   Relevant past medical history: positive for glaucoma, TMJ, anxiety, diabetes type 2 with bilateral feet neuropathy, restless legs, sleep apnea and cirrhosis non alcoholic.    OBJECTIVE: The patient appears healthy, alert, no distress, cooperative, smiling, over weight and confusing sometimes in her history telling.   EARS: negative  NOSE/SINUS: Nares normal. Septum midline. Mucosa normal. No drainage or sinus tenderness.   THROAT: normal   NECK:Neck supple. No adenopathy. Thyroid symmetric, normal size,, Carotids without bruits.   CHEST: Clear  EXT: The lower extremities are normal and reveal no sign of DVT. Calves and thighs are soft and non tender, color is normal, no swelling or redness. Kay's sign is negative.  Pedal pulses are normal. Normal monofilament testing  Skin: Back and torso eczematous lesions with excoriations. No puncture wounds noted.  BMI : Body mass index is 31.14 kg/(m^2).  Weight down    LEATHA: WNL, mild change left leg  A1C : 6.4  CBC: WNL    ASSESSMENT: (M62.838) Muscle spasm  (primary encounter diagnosis)  Comment: cut back furosemide to 10 mgm ( 1/2 tablets)  Plan: potassium chloride SA (K-DUR/KLOR-CON M) 10 MEQ        CR tablet, CL AFF HEMOGRAM/PLATE/DIFF (BFP),         Potassium (QUEST), VENOUS COLLECTION        Start daily potassium, drink more water and work on diet/ multivitamin  daily  Follow up with GI specialists ordering this medication.    (L30.8) Other eczema  Comment: gentle skin care  Plan: hydrocortisone (WESTCORT) 0.2 % cream, CL AFF         HEMOGRAM/PLATE/DIFF (BFP), VENOUS COLLECTION        Potential medication side effects were discussed with the patient; let me know if any occur.  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

## 2018-01-23 NOTE — PATIENT INSTRUCTIONS
Start potassium daily and cut the furosemide 20 mgm in 1/2 to 10 mgm daily  Drink more water    Moisturize skin with Cetaphil soap an moisturizer every where  Use prescription cream on the irritated areas    Await labs    Multivitamin  Work on diabetic diet  Fruits and vegetables

## 2018-01-23 NOTE — NURSING NOTE
"Evy Song is here today for derm issues and leg cramping x4 days which is waking her up at night and moving up her left leg to her hip.    Pre-visit Planning:    Immunizations -not up to date - Tdap and Flu Shot Today  Colonoscopy -No longer needs  Mammogram -No longer needs  Asthma test --NA  PHQ9 -is up to date  CARLA 7 -is up to date      Vitals:    BP Cuff left  Arm with large Cuff  PULSE regular  181 lbs 6.4 oz and 5' 4\"  CLASSIFICATION OF OVERWEIGHT AND OBESITY BY BMI                        Obesity Class           BMI(kg/m2)  Underweight                                    < 18.5  Normal                                         18.5-24.9  Overweight                                     25.0-29.9  OBESITY                     I                  30.0-34.9                             II                 35.0-39.9  EXTREME OBESITY             III                >40      Patient's  BMI Body mass index is 31.14 kg/(m^2).  Http://hin.nhlbi.nih.gov/menuplanner/menu.cgi    My Chart: - declines     Tobacco Use: Questioned patient about current smoking habits.  Pt. has never smoked.    The patient has verbalized that it is ok to leave a detailed voice message on the patient's home voicemail with results/recommendations from this visit.     Verified Phone Number: [phone]      Carolynn Rodgers CMA    "

## 2018-01-25 LAB — POTASSIUM SERPL-SCNC: 3.9 MMOL/L (ref 3.5–5.3)

## 2018-02-21 NOTE — MR AVS SNAPSHOT
After Visit Summary   2/21/2018    Evy Song    MRN: [id number]           Patient Information     Date Of Birth          6/23/1934        Visit Information        Provider Department      2/21/2018 10:30 AM Liz Chapin MD [facility]        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)    -  1    Mixed hyperlipidemia        Essential hypertension, benign        Polyneuropathy associated with underlying disease (H)        Bad odor of urine        IPF (idiopathic pulmonary fibrosis) (H)        Obesity (BMI 30.0-34.9)          Care Instructions    Bring back urine sample    Follow up with eye specialists, DR Anderson pulmonary, Dr Pollock [facility] and dermatology review    1)  Medication: continue current medication regimen unchanged  2)  Low fat, low cholesterol diet and low salt/ count carb's and diabetic diet  3)  Regular aerobic exercise like walking and weight loss  4)  Recheck in 6 months, sooner should new symptoms or   problems arise.    Patient Education: Reviewed risks of elevated lipids and principles   of treatment.              Follow-ups after your visit        Future tests that were ordered for you today     Open Standing Orders        Priority Remaining Interval Expires Ordered    HCL  Urinalysis, Routine (BFP) Routine 1/1  3/21/2018 2/21/2018            Who to contact     If you have questions or need follow up information about today's clinic visit or your schedule please contact [facility] directly at [phone].  Normal or non-critical lab and imaging results will be communicated to you by MyChart, letter or phone within 4 business days after the clinic has received the results. If you do not hear from us within 7 days, please contact the clinic through MyChart or phone. If you have a critical or abnormal lab result, we will notify you by phone as soon as possible.  Submit refill requests through  "Naila or call your pharmacy and they will forward the refill request to us. Please allow 3 business days for your refill to be completed.          Additional Information About Your Visit        Care EveryWhere ID     This is your Care EveryWhere ID. This could be used by other organizations to access your [facility] medical records  [id number]        Your Vitals Were     Pulse Temperature Height Last Period Pulse Oximetry Breastfeeding?    104 97.7  F (36.5  C) (Oral) 1.613 m (5' 3.5\") (LMP Unknown) 94% No    BMI (Body Mass Index)                   31.39 kg/m2            Blood Pressure from Last 3 Encounters:   02/21/18 126/68   01/23/18 112/74   08/21/17 138/86    Weight from Last 3 Encounters:   02/21/18 81.6 kg (180 lb)   01/23/18 82.3 kg (181 lb 6.4 oz)   08/21/17 88.9 kg (196 lb)              We Performed the Following     C FOOT EXAM  NO CHARGE     COMPREHENSIVE METABOLIC PANEL     Hemoglobin A1c     LIPID PANEL     VENOUS COLLECTION          Today's Medication Changes          These changes are accurate as of 2/21/18  1:20 PM.  If you have any questions, ask your nurse or doctor.               These medicines have changed or have updated prescriptions.        Dose/Directions    fluticasone 50 MCG/ACT spray   Commonly known as:  FLONASE   This may have changed:    - when to take this  - reasons to take this   Used for:  Chronic rhinitis        Dose:  1-2 spray   Spray 1-2 sprays into both nostrils daily   Quantity:  16 g   Refills:  3            Where to get your medicines      These medications were sent to [id number] - [place], [place] - [address] [place] [address] [address] [place] [address]. [address], [facility] [address]     Phone:  [phone]     losartan 50 MG tablet    metFORMIN 500 MG 24 hr tablet    potassium chloride SA 10 MEQ CR tablet    simvastatin 20 MG tablet                Primary Care Provider Office Phone # Fax #    Liz Chapin [phone]       [address] " [place] [address]        Equal Access to Services     [facility] : Hadii gato hare miguel Gomez, wamarcelada luqellie, estefany teresebayronmateo huntgabrielemelyssa lepe. So [facility] [phone].    ATENCIÓN: Si habla español, tiene a ta disposición servicios gratuitos de asistencia lingüística. Lennieame al [phone].    We comply with applicable federal civil rights laws and [place] laws. We do not discriminate on the basis of race, color, national origin, age, disability, sex, sexual orientation, or gender identity.            Thank you!     Thank you for choosing [facility]  for your care. Our goal is always to provide you with excellent care. Hearing back from our patients is one way we can continue to improve our services. Please take a few minutes to complete the written survey that you may receive in the mail after your visit with us. Thank you!             Your Updated Medication List - Protect others around you: Learn how to safely use, store and throw away your medicines at www.disposemymeds.org.          This list is accurate as of 2/21/18  1:20 PM.  Always use your most recent med list.                   Brand Name Dispense Instructions for use Diagnosis    ARNUITY ELLIPTA 100 MCG/ACT Aepb inhalation powder   Generic drug:  fluticasone furoate      Take 100 mcg by mouth daily    IPF (idiopathic pulmonary fibrosis) (H)       aspirin 81 MG tablet     0    1 tab po QD (Once per day)    Essential hypertension, benign       AZOPT 1 % ophthalmic susp   Generic drug:  brinzolamide      1 DROP BOTH EYES BID (am and hs)        calcium polycarbophil 625 MG tablet    FIBERCON    120 tablet    Take 4 tablets (2,500 mg) by mouth daily    Irritable bowel syndrome       dorzolamide 2 % ophthalmic solution    TRUSOPT      Low-tension glaucoma of both eyes, unspecified glaucoma stage       EPINEPHrine 0.3 MG/0.3ML injection 2-pack    EPIPEN 2-LALA    2 each    Inject 0.3 mLs (0.3 mg)  into the muscle once as needed for anaphylaxis    Sting of hornets, wasps, and bees as the cause of poisoning and toxic reactions(E905.3)       ferrous gluconate 324 (38 FE) MG tablet    FERGON     Take 1 tablet (324 mg) by mouth daily (with breakfast)    Other iron deficiency anemia, Restless leg syndrome       fluticasone 50 MCG/ACT spray    FLONASE    16 g    Spray 1-2 sprays into both nostrils daily    Chronic rhinitis       hydrocortisone 0.2 % cream    WESTCORT    45 g    Apply sparingly to affected area as needed up to twice daily    Other eczema       LASIX 20 MG tablet   Generic drug:  furosemide      Take 0.5 tablets (10 mg) by mouth daily        losartan 50 MG tablet    COZAAR    45 tablet    Take 0.5 tablets (25 mg) by mouth daily    Essential hypertension, benign, 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)       LYRICA 75 MG capsule   Generic drug:  pregabalin      Take 150 mg by mouth nightly as needed        metFORMIN 500 MG 24 hr tablet    GLUCOPHAGE-XR    360 tablet    Take 4 tablets (2,000 mg) by mouth daily (with dinner)    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), Obesity (BMI 30.0-34.9)       ONETOUCH ULTRA test strip   Generic drug:  blood glucose monitoring     100 each    USE TO TEST BLOOD SUGARS ONCE DAILY AS DIRECTED    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)       potassium chloride SA 10 MEQ CR tablet    K-DUR/KLOR-CON M    90 tablet    Take 1 tablet (10 mEq) by mouth daily    Essential hypertension, benign, Polyneuropathy associated with underlying disease (H)       PROAIR  (90 BASE) MCG/ACT Inhaler   Generic drug:  albuterol      INHALE 2 PUFF BY INHALATION ROUTE EVERY 4 - 6 HOURS AS NEEDED        RESTASIS 0.05 % ophthalmic emulsion   Generic drug:  cycloSPORINE      Place 1 drop into both eyes as needed        rOPINIRole 0.25 MG tablet    REQUIP     Take 0.25 mg by mouth as needed    Restless leg syndrome        simvastatin 20 MG tablet    ZOCOR    90 tablet    Take 1 tablet (20 mg) by mouth At Bedtime    Mixed hyperlipidemia, Obesity (BMI 30.0-34.9), 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

## 2018-02-21 NOTE — LETTER
February 23, 2018      Evy Song  [id number] [facility] [address]        Dear MsRubenDuncan,    We are writing to inform you of your test results.    Improvement of most of your labs especially your cholesterol and fasting blood sugars.        If you have any questions or concerns, please call the clinic at the number listed above.       Sincerely,        Liz Chapin MD

## 2018-02-21 NOTE — PATIENT INSTRUCTIONS
Bring back urine sample    Follow up with eye specialists, DR Anderson pulmonary, Dr Pollock [facility] and dermatology review    1)  Medication: continue current medication regimen unchanged  2)  Low fat, low cholesterol diet and low salt/ count carb's and diabetic diet  3)  Regular aerobic exercise like walking and weight loss  4)  Recheck in 6 months, sooner should new symptoms or   problems arise.    Patient Education: Reviewed risks of elevated lipids and principles   of treatment.

## 2018-02-21 NOTE — NURSING NOTE
Evy is here for a A1c check    Pre-Visit Screening :  Immunizations : up to date    Colonoscopy : na  Mammogram : na  Asthma Action Test/Plan : na  PHQ9/GAD7 :  Na    Pulse - regular  My Chart - declines    CLASSIFICATION OF OVERWEIGHT AND OBESITY BY BMI                         Obesity Class           BMI(kg/m2)  Underweight                                    < 18.5  Normal                                         18.5-24.9  Overweight                                     25.0-29.9  OBESITY                     I                  30.0-34.9                              II                 35.0-39.9  EXTREME OBESITY             III                >40                             Patient's  BMI Body mass index is 22.15 kg/(m^2).  http://hin.nhlbi.nih.gov/menuplanner/menu.cgi  Questioned patient about current smoking habits.  Pt. has never smoked.  The patient has verbalized that it is ok to leave a detailed voice message on the patient's cell phone with results/recommendations from this visit.       Verified [phone] phone number:

## 2018-02-21 NOTE — PROGRESS NOTES
SUBJECTIVE:  Evy Song is an 83 year old female who presents for evaluation and treatment   of Type 2 diabetes mellitus. Age at diagnosis 75. Family history   positive for diabetes in the patient s father.   Previous treatment modalities employed include diet, oral agents and ASA.   Current treatment includes diet, oral agents, exercise and ASA.     Current monitoring regimen: home blood tests - once daily  Home blood sugar records:   Last HgbA1c: 6.4  Diabetic complications: peripheral neuropathy and cardiovascular disease  Cardiovascular risk factors: family history, lipids, diabetes mellitus, hypertension, obesity, sedentary life style, stress and tachycardia with VT controlled by medications and nonalcoholic cirrhosis    Current Outpatient Prescriptions   Medication     ARNUITY ELLIPTA 100 MCG/ACT AEPB inhalation powder     hydrocortisone (WESTCORT) 0.2 % cream     potassium chloride SA (K-DUR/KLOR-CON M) 10 MEQ CR tablet     furosemide (LASIX) 20 MG tablet     ONETOUCH ULTRA test strip     dorzolamide (TRUSOPT) 2 % ophthalmic solution     losartan (COZAAR) 50 MG tablet     metFORMIN (GLUCOPHAGE-XR) 500 MG 24 hr tablet     simvastatin (ZOCOR) 20 MG tablet     rOPINIRole (REQUIP) 0.25 MG tablet     ferrous gluconate (FERGON) 324 (38 FE) MG tablet     fluticasone (FLONASE) 50 MCG/ACT nasal spray     ALBUTEROL 108 (90 BASE) MCG/ACT inhaler     RESTASIS 0.05 % ophthalmic emulsion     LYRICA 75 MG capsule     EPINEPHrine (EPIPEN 2-LALA) 0.3 MG/0.3ML injection     polycarbophil (FIBERCON) 625 MG tablet     AZOPT 1 % OP SUSP     XALATAN 0.005 % OP SOLN     ASPIRIN 81 MG OR TABS     No current facility-administered medications for this visit.      Allergies   Allergen Reactions     Monosodium Glutamate Cough and Shortness Of Breath     Timolol Rash     Bee Venom      hives,anaphylaxis     Benadryl [Diphenhydramine] Other (See Comments)     Lightheaded     Celecoxib      spasms       Social History   Substance  "Use Topics     Smoking status: Never Smoker     Smokeless tobacco: Never Used     Alcohol use No       Review Of Systems  Skin: itching  Eyes: macular degeneration/   Ears/Nose/Throat: negative  Respiratory: pulmonary fibrosis/ Dr Anderson  Cardiovascular: hypertension and elevated cholesterol  Gastrointestinal: fatty liver/ [facility] doing liver scans/ going March 8  Genitourinary: urine odor  Musculoskeletal: muscular weakness and using a cane. Fell and hit right knee/ going to go back to see [facility] DR Pollock  Neurologic: numbness or tingling of feet and neuropathy/ restless legs Dr Law  Psychiatric: excessive stress-single and not liking her change in diet  Hematologic/Lymphatic/Immunologic: negative  Endocrine: diabetes    OBJECTIVE:  /68 (BP Location: Right arm, Patient Position: Chair, Cuff Size: Adult Large)  Pulse 104  Temp 97.7  F (36.5  C) (Oral)  Ht 1.613 m (5' 3.5\")  Wt 81.6 kg (180 lb)  LMP  (LMP Unknown)  SpO2 94%  Breastfeeding? No  BMI 31.39 kg/m2  General appearance: healthy, alert, no distress, cooperative, smiling, fatigued and with a cane  Skin: Skin color, texture, turgor normal. No rashes or lesions.  Eyes: conjunctivae/corneas clear. PERRL, EOM's intact. Fundi benign  Ears: negative  Oropharynx: Lips, mucosa, and tongue normal. Teeth and gums normal.  Neck: Neck supple. No adenopathy. Thyroid symmetric, normal size,, Carotids without bruits.  Lungs: negative, Percussion normal. Good diaphragmatic excursion. Lungs clear  Heart: negative, PMI normal. No lifts, heaves, or thrills. RRR. No murmurs, clicks gallops or rub  Abdomen: Abdomen soft, non-tender. BS normal. No masses, organomegaly, positive findings: obese  Extremities: Extremities normal. No deformities, edema, or skin discoloration.  Peripheral pulses: radial=4/4, femoral=4/4, popliteal=4/4, dorsalis pedis=4/4,  Neuro: Gait normal. Reflexes normal and symmetric. Sensation grossly WNL.  BMI : Body mass index is 31.39 " kg/(m^2).    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)  (primary encounter diagnosis)  Plan: Hemoglobin A1c, VENOUS COLLECTION,         COMPREHENSIVE METABOLIC PANEL, LIPID PANEL, C         FOOT EXAM  NO CHARGE, simvastatin (ZOCOR) 20 MG        tablet, metFORMIN (GLUCOPHAGE-XR) 500 MG 24 hr         tablet, losartan (COZAAR) 50 MG tablet        A low fat diet, regular aerobic exercise like walking 30 minutes daily and weight control is the treatment recommendations at this time  Recheck 6 months    (E78.2) Mixed hyperlipidemia  Plan: VENOUS COLLECTION, LIPID PANEL, simvastatin         (ZOCOR) 20 MG tablet        1)  Medication: continue current medication regimen unchanged  2)  Low fat, low cholesterol diet  3)  Regular aerobic exercise  4)  Recheck in 6 months, sooner should new symptoms or   problems arise.    Patient Education: Reviewed risks of elevated lipids and principles   of treatment.        (I10) Essential hypertension, benign  Plan: VENOUS COLLECTION, COMPREHENSIVE METABOLIC         PANEL, potassium chloride SA (K-DUR/KLOR-CON M)        10 MEQ CR tablet, losartan (COZAAR) 50 MG         tablet        1)  Medication: continue current medication regimen unchanged  2)  Dietary sodium restriction  3)  Regular aerobic exercise  4)  Recheck in 6 months, sooner should new symptoms or   problems arise.    Patient Education: Reviewed risks of hypertension and principles of   treatment.        (G63) Polyneuropathy associated with underlying disease (H)  Plan: VENOUS COLLECTION, COMPREHENSIVE METABOLIC         PANEL, potassium chloride SA (K-DUR/KLOR-CON M)        10 MEQ CR tablet        I have reviewed the patient's medical history in detail and updated the computerized patient record.      (R82.90) Bad odor of urine  Plan: HCL  Urinalysis, Routine (BFP)        Will bring back sample today    (J84.112) IPF (idiopathic pulmonary fibrosis) (H  Plan: ARNUITY ELLIPTA 100 MCG/ACT  AEPB inhalation         powder        I have reviewed the patient's medical history in detail and updated the computerized patient record.      (E66.9) Obesity (BMI 30.0-34.9)  Plan: simvastatin (ZOCOR) 20 MG tablet, metFORMIN         (GLUCOPHAGE-XR) 500 MG 24 hr tablet        A low fat diet, regular aerobic exercise like walking 30 minutes daily and weight control is the treatment recommendations at this time

## 2018-02-22 NOTE — TELEPHONE ENCOUNTER
LMCTB      Urine + for infection  Bactrim DS bid x 7 days  UC pending    Rx sent to [facility]  Watch for allergic reaction.  Push water    RTC within 48 hours if sx worsen.      Alison Gil PA-C  2/22/2018

## 2018-02-26 NOTE — TELEPHONE ENCOUNTER
Called patient at home to report urine culture with sensitivities. Has taken antibiotic and some improvement. Reviewed the report. Switch antibiotic to Augmentin bid for 7 days.

## 2018-04-19 NOTE — TELEPHONE ENCOUNTER
Pending Prescriptions:                       Disp   Refills    spironolactone (ALDACTONE) 25 MG tablet [*30 tab*0            Sig: TAKE ONE TABLET BY MOUTH ONE TIME DAILY       LES please review:    This was discontinued by you on 8-  Pt last ov was 2-  Cruz  [phone] (home)

## 2018-04-19 NOTE — TELEPHONE ENCOUNTER
Talked to pt   Pt states that she does not need this now, but if she wants to start again she will call her GI doc  Cruz  [phone] (home)

## 2018-06-22 NOTE — NURSING NOTE
Evy Song is here today because she is having a strong odor to her urine, it is very deep yellow in color, some pelvic pain (left side), and painful urination. This has all gotten very bad over the past week, she brought a sample in a non sterile cotainer so we could not perform a UA. She states she is not able to produce a sample in the clinic due to a mental block, we gave her a couple of sterile containers and wipes to take home and bring back a sample.    Pre-visit Screening:    Immunizations:  up to date  Colonoscopy:  No longer needs  Mammogram: No longer needs  Asthma Action Test/Plan:  NA  PHQ9:  Is up to date  GAD7:  Is up to date    Questioned patient about current smoking habits Pt. has never smoked.    Is it ok to leave a detailed message on home phone's voice mail for today's visit only? Yes     Phone # [phone] (home)      Carolynn Rodgers [facility]

## 2018-06-22 NOTE — MR AVS SNAPSHOT
After Visit Summary   6/22/2018    Evy Song    MRN: [id number]           Patient Information     Date Of Birth          6/23/1934        Visit Information        Provider Department      6/22/2018 12:15 PM Liz Chapin MD [facility]        Today's Diagnoses     Abnormal urine odor    -  1    Strain of abdominal muscle, initial encounter        Gastroesophageal reflux disease without esophagitis        Open-angle glaucoma of both eyes, unspecified glaucoma stage, unspecified open-angle glaucoma type        Essential hypertension, benign          Care Instructions    Bring back urine sample as instructed    Patient was instructed to drink plenty of fluids, urinate frequently and to contact the office promptly should she notice fever greater than 102, increase in discomfort, skin rash, lack of improvement after 2 days of treatment, or the appearance of new symptoms.            Follow-ups after your visit        Future tests that were ordered for you today     Open Standing Orders        Priority Remaining Interval Expires Ordered    Urine Culture Aerobic Bacterial Routine 1/1 6/22/2019 6/22/2018    HCL  Urinalysis, Routine (BFP) Routine 1/1 6/22/2019 6/22/2018            Who to contact     If you have questions or need follow up information about today's clinic visit or your schedule please contact [facility] directly at [phone].  Normal or non-critical lab and imaging results will be communicated to you by MyChart, letter or phone within 4 business days after the clinic has received the results. If you do not hear from us within 7 days, please contact the clinic through MyChart or phone. If you have a critical or abnormal lab result, we will notify you by phone as soon as possible.  Submit refill requests through [employer] or call your pharmacy and they will forward the refill request to us. Please allow 3 business days for your refill to be  completed.          Additional Information About Your Visit        Care EveryWhere ID     This is your Care EveryWhere ID. This could be used by other organizations to access your [facility] medical records  [id number]        Your Vitals Were     Pulse Temperature Respirations Last Period Pulse Oximetry Breastfeeding?    72 97.9  F (36.6  C) (Oral) 16 (LMP Unknown) 95% No    BMI (Body Mass Index)                   31.66 kg/m2            Blood Pressure from Last 3 Encounters:   06/22/18 124/58   02/21/18 126/68   01/23/18 112/74    Weight from Last 3 Encounters:   06/22/18 82.4 kg (181 lb 9.6 oz)   02/21/18 81.6 kg (180 lb)   01/23/18 82.3 kg (181 lb 6.4 oz)                 Today's Medication Changes          These changes are accurate as of 6/22/18  2:31 PM.  If you have any questions, ask your nurse or doctor.               These medicines have changed or have updated prescriptions.        Dose/Directions    fluticasone 50 MCG/ACT spray   Commonly known as:  FLONASE   This may have changed:    - when to take this  - reasons to take this   Used for:  Chronic rhinitis        Dose:  1-2 spray   Spray 1-2 sprays into both nostrils daily   Quantity:  16 g   Refills:  3                Primary Care Provider Office Phone # Fax #    Liz Chapin [phone]       [address] [address]        Equal Access to Services     [facility]: Hadii gato Gomez, waaxda lupeteradaha, qaybta kaalmada sarbjit, mateo lepe. So [facility] [phone].    ATENCIÓN: Si habla español, tiene a at disposición servicios gratuitos de asistencia lingüística. Matt al [phone].    We comply with applicable federal civil rights laws and [place] laws. We do not discriminate on the basis of race, color, national origin, age, disability, sex, sexual orientation, or gender identity.            Thank you!     Thank you for choosing [facility]  for your care. Our goal is always to provide you with excellent care. Hearing back from our patients is one way we can continue to improve our services. Please take a few minutes to complete the written survey that you may receive in the mail after your visit with us. Thank you!             Your Updated Medication List - Protect others around you: Learn how to safely use, store and throw away your medicines at www.disposemymeds.org.          This list is accurate as of 6/22/18  2:31 PM.  Always use your most recent med list.                   Brand Name Dispense Instructions for use Diagnosis    ARNUITY ELLIPTA 100 MCG/ACT Aepb inhalation powder   Generic drug:  fluticasone furoate      Take 100 mcg by mouth daily    IPF (idiopathic pulmonary fibrosis) (H)       aspirin 81 MG tablet     0    1 tab po QD (Once per day)    Essential hypertension, benign       AZOPT 1 % ophthalmic susp   Generic drug:  brinzolamide      1 DROP BOTH EYES BID (am and hs)        calcium polycarbophil 625 MG tablet    FIBERCON    120 tablet    Take 4 tablets (2,500 mg) by mouth daily    Irritable bowel syndrome       dorzolamide 2 % ophthalmic solution    TRUSOPT      Low-tension glaucoma of both eyes, unspecified glaucoma stage       EPINEPHrine 0.3 MG/0.3ML injection 2-pack    EPIPEN 2-LALA    2 each    Inject 0.3 mLs (0.3 mg) into the muscle once as needed for anaphylaxis    Sting of hornets, wasps, and bees as the cause of poisoning and toxic reactions(E905.3)       ferrous gluconate 324 (38 Fe) MG tablet    FERGON     Take 1 tablet (324 mg) by mouth daily (with breakfast)    Other iron deficiency anemia, Restless leg syndrome       fluticasone 50 MCG/ACT spray    FLONASE    16 g    Spray 1-2 sprays into both nostrils daily    Chronic rhinitis       hydrocortisone 0.2 % cream    WESTCORT    45 g    Apply sparingly to affected area as needed up to twice daily    Other eczema       LASIX 20 MG tablet   Generic drug:  furosemide       Take 0.5 tablets (10 mg) by mouth daily        losartan 50 MG tablet    COZAAR    45 tablet    Take 0.5 tablets (25 mg) by mouth daily    Essential hypertension, benign, 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)       LYRICA 75 MG capsule   Generic drug:  pregabalin      Take 150 mg by mouth nightly as needed        metFORMIN 500 MG 24 hr tablet    GLUCOPHAGE-XR    360 tablet    Take 4 tablets (2,000 mg) by mouth daily (with dinner)    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), Obesity (BMI 30.0-34.9)       metoprolol tartrate 25 MG tablet    LOPRESSOR     Take 25 mg by mouth 2 times daily    Essential hypertension, benign       neomycin-polymyxin-dexamethasone 3.5-40773-3.1 Susp ophthalmic susp    MAXITROL      Open-angle glaucoma of both eyes, unspecified glaucoma stage, unspecified open-angle glaucoma type       omeprazole 20 MG CR capsule    priLOSEC     Take 20 mg by mouth daily    Gastroesophageal reflux disease without esophagitis       ONETOUCH ULTRA test strip   Generic drug:  blood glucose monitoring     100 each    USE TO TEST BLOOD SUGARS ONCE DAILY AS DIRECTED    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)       potassium chloride SA 10 MEQ CR tablet    K-DUR/KLOR-CON M    90 tablet    Take 1 tablet (10 mEq) by mouth daily    Essential hypertension, benign, Polyneuropathy associated with underlying disease (H)       PROAIR  (90 Base) MCG/ACT Inhaler   Generic drug:  albuterol      INHALE 2 PUFF BY INHALATION ROUTE EVERY 4 - 6 HOURS AS NEEDED        RESTASIS 0.05 % ophthalmic emulsion   Generic drug:  cycloSPORINE      Place 1 drop into both eyes as needed        rOPINIRole 0.25 MG tablet    REQUIP     Take 0.25 mg by mouth as needed    Restless leg syndrome       simvastatin 20 MG tablet    ZOCOR    90 tablet    Take 1 tablet (20 mg) by mouth At Bedtime    Mixed hyperlipidemia, Obesity (BMI 30.0-34.9), Diabetes mellitus type 2 with  neurological manifestations (H)       XALATAN 0.005 % ophthalmic solution   Generic drug:  latanoprost     0    1 drop  bot eyes at hs    Open-angle glaucoma, unspecified

## 2018-06-22 NOTE — PROGRESS NOTES
SUBJECTIVE:   Evy Song is an 83 year old year old who presents with suspected urinary tract   infection. Her symptoms began 2 weeks ago ago and   include urinary odor in the morning/ 2 days left lower abdominal pain when going from sitting to standing.     Predisposing factors: sits and not active, abdominal obesity, regular alcohol use, cirrhosis liver fatty type  Hx of previous UTI s: frequent , last 2/2018  Sexually active: no    Current Outpatient Prescriptions   Medication     ARNUITY ELLIPTA 100 MCG/ACT AEPB inhalation powder     ASPIRIN 81 MG OR TABS     AZOPT 1 % OP SUSP     dorzolamide (TRUSOPT) 2 % ophthalmic solution     ferrous gluconate (FERGON) 324 (38 FE) MG tablet     fluticasone (FLONASE) 50 MCG/ACT nasal spray     furosemide (LASIX) 20 MG tablet     hydrocortisone (WESTCORT) 0.2 % cream     losartan (COZAAR) 50 MG tablet     LYRICA 75 MG capsule     metFORMIN (GLUCOPHAGE-XR) 500 MG 24 hr tablet     polycarbophil (FIBERCON) 625 MG tablet     potassium chloride SA (K-DUR/KLOR-CON M) 10 MEQ CR tablet     RESTASIS 0.05 % ophthalmic emulsion     rOPINIRole (REQUIP) 0.25 MG tablet     simvastatin (ZOCOR) 20 MG tablet     XALATAN 0.005 % OP SOLN     ALBUTEROL 108 (90 BASE) MCG/ACT inhaler     EPINEPHrine (EPIPEN 2-LALA) 0.3 MG/0.3ML injection     metoprolol tartrate (LOPRESSOR) 25 MG tablet     neomycin-polymyxin-dexamethasone (MAXITROL) 3.5-25363-6.1 SUSP ophthalmic susp     omeprazole (PRILOSEC) 20 MG CR capsule     ONETOUCH ULTRA test strip     No current facility-administered medications for this visit.           Allergies   Allergen Reactions     Monosodium Glutamate Cough and Shortness Of Breath     Timolol Rash     Bee Venom      hives,anaphylaxis     Benadryl [Diphenhydramine] Other (See Comments)     Lightheaded     Celecoxib      spasms       OBJECTIVE:   Vitals:    06/22/18 1248   BP: 124/58   BP Location: Left arm   Patient Position: Chair   Cuff Size: Adult Large   Pulse: 72   Temp: 97.9   F (36.6  C)   TempSrc: Oral   SpO2: 95%   Weight: 82.4 kg (181 lb 9.6 oz)       General appearance: Alert, oriented, well hydrated. Skin turgor normal.    Hydration: well hydrated   CVA tenderness to percussion: no  Abdomen: Obese protuberant/ Left lower rectus musculature palpable pain. NO hernia noted  Tenderness: as above   Masses: none  Organomegaly: none    UA:Urine dipstick shows not done.  Micro exam: pending. Patient will bring back a specimen    ASSESSMENT/PLAN:   (R82.90) Abnormal urine odor  (primary encounter diagnosis)  Comment: await results  Plan: Patient was instructed to drink plenty of fluids, urinate frequently and to contact the office promptly should she notice fever greater than 102, increase in discomfort, skin rash, lack of improvement after 2 days of treatment, or the appearance of new symptoms.      (S39.011A) Strain of abdominal muscle, initial encounter  Comment: anatomy reviewed  Plan: Monitor    (K21.9) Gastroesophageal reflux disease without esophagitis  Plan: omeprazole (PRILOSEC) 20 MG CR capsule        I have reviewed the patient's medical history in detail and updated the computerized patient record.      (H40.10X0) Open-angle glaucoma of both eyes, unspecified glaucoma stage, unspecified open-angle glaucoma type  Plan: neomycin-polymyxin-dexamethasone (MAXITROL)         3.5-78041-3.1 SUSP ophthalmic susp        I have reviewed the patient's medical history in detail and updated the computerized patient record.      (I10) Essential hypertension, benign  Plan: metoprolol tartrate (LOPRESSOR) 25 MG tablet        I have reviewed the patient's medical history in detail and updated the computerized patient record.  Cardiology

## 2018-06-22 NOTE — PATIENT INSTRUCTIONS
Bring back urine sample as instructed    Patient was instructed to drink plenty of fluids, urinate frequently and to contact the office promptly should she notice fever greater than 102, increase in discomfort, skin rash, lack of improvement after 2 days of treatment, or the appearance of new symptoms.

## 2018-06-23 NOTE — TELEPHONE ENCOUNTER
Called home number to report positive UA/ culture pending.     Await response  Patient was instructed to drink plenty of fluids, urinate frequently and to contact the office promptly should she notice fever greater than 102, increase in discomfort, skin rash, lack of improvement after 2 days of treatment, or the appearance of new symptoms.  .

## 2018-06-23 NOTE — NURSING NOTE
Evy Song dropped off a urine sample that she produced and brought in right away. The orders have been released from standing.    Carolynn Rodgers CMA

## 2018-06-23 NOTE — LETTER
June 26, 2018      Evy Song  [address] [facility] [address]        Dear ,    We are writing to inform you of your test results.    Urine culture positive for a bacteria that should be adequately treated by the antibiotic I gave you at your recent visit.      Resulted Orders   HCL  Urinalysis, Routine (BFP)   Result Value Ref Range    Color Urine Straw     Appearance Urine Cloudy     Glucose Urine Neg neg mg/dL    Bilirubin Urine Neg neg    Ketones Urine Neg neg mg/dL    Specific Gravity 1.015     Blood Urine Trace (A) neg      Comment:      Trace-Lysed    pH Urine 5.5 5.0 - 7.0 pH    Albumin Urine Neg neg - neg mg/dL    Urobilinogen Urine 0.2 0.2 - 1.0 EU/dL    Nitrite Urine Pos (A) NEG    Leukocyte Esterase Mod (A) neg - neg    Wbc, Urine Micro >100 (A) neg - 2    RBC Micro Urine >100 (A) neg - 2    EP/HPF Mod     Bacteria Urine Large (A) neg - neg    Casts/LPF Neg     Miscellaneous Neg    Urine Culture Aerobic Bacterial   Result Value Ref Range    Urine Voided Culture SEE COMMENT (A)       Comment:        CULTURE, URINE, ROUTINE         MICRO NUMBER:      [id number]    TEST STATUS:       FINAL    SPECIMEN SOURCE:   URINE    SPECIMEN QUALITY:  ADEQUATE    RESULT:            Greater than 100,000 CFU/mL of Escherichia coli    COMMENT:           Additional organism(s) less than 10,000 CFU/mL                       isolated. These organisms, commonly found on                       external and internal genitalia, are considered                       colonizers. No further testing performed.                            E.coli                            ----------------                            INT   JEANIE     AMOX/CLAVULANATE       S     8     AMPICILLIN             R     >=32     AMP/SULBACTAM          S     8     CEFAZOLIN              NR    <=4 **2     CEFEPIME               S     <=1     CEFTRIAXONE            S     <=1     CIPROFLOXACIN          R     >=4     ERTAPENEM              S      <=0.5     GENTAMICIN             S     <=1     IMIPENEM               S     <=0.25     LEVOFLOXACIN             R     >=8     NITROFURANTOIN         S     <=16     PIP/TAZOBACTAM         S     <=4     TOBRAMYCIN             S     <=1     TRIMETHOPRIM/SULFA     R     >=320  S=Susceptible  I=Intermediate  R=Resistant  * = Not Tested  NR = Not Reported  **NN = See Therapy Comments  THERAPY COMMENTS      Note 1:      For infections other than uncomplicated UTI      caused by E. coli, K. pneumoniae or P. mirabilis:      Cefazolin is resistant if JEANIE > or = 8 mcg/mL.      (Distinguishing susceptible versus intermediate      for isolates with JEANIE < or = 4 mcg/mL requires      additional testing.)      Note 2:      For uncomplicated UTI caused by E. coli,      K. pneumoniae or P. mirabilis: Cefazolin is      susceptible if JEANIE <32 mcg/mL and predicts      susceptible to the oral agents cefaclor, cefdinir,      cefpodoxime, cefprozil, cefuroxime, cephalexin      and loracarbef.         If you have any questions or concerns, please call the clinic at the number listed above.       Sincerely,        Liz Chapin MD

## 2018-07-17 NOTE — NURSING NOTE
Evy is here for a follow up with a urinary infection and swelling she has been having in both feet for three weeks    Pre-Visit Screening :  Immunizations : up to date    Colonoscopy : is up to date  Mammogram : is up to date  Asthma Action Test/Plan : na  PHQ9/GAD7 :  Na    Pulse - regular  My Chart - declines  CLASSIFICATION OF OVERWEIGHT AND OBESITY BY BMI                         Obesity Class           BMI(kg/m2)  Underweight                                    < 18.5  Normal                                         18.5-24.9  Overweight                                     25.0-29.9  OBESITY                     I                  30.0-34.9                              II                 35.0-39.9  EXTREME OBESITY             III                >40                             Patient's  BMI Body mass index is 22.15 kg/(m^2).  http://hin.nhlbi.nih.gov/menuplanner/menu.cgi  Questioned patient about current smoking habits.  Pt. has never smoked.  The patient has verbalized that it is ok to leave a detailed voice message on the patient's cell phone with results/recommendations from this visit.       Verified [phone]  phone number:

## 2018-07-17 NOTE — MR AVS SNAPSHOT
After Visit Summary   7/17/2018    Evy Song    MRN: [id number]           Patient Information     Date Of Birth          6/23/1934        Visit Information        Provider Department      7/17/2018 2:30 PM Liz Chapin MD [facility]        Today's Diagnoses     Tachycardia    -  1    Pedal edema        Essential hypertension, benign        Fatty liver disease, nonalcoholic        Urinary hesitancy        Cirrhosis, non-alcoholic (H)        Polyneuropathy associated with underlying disease (H)          Care Instructions    Await labs and urine culture    Consult back to [facility] to discuss diuretic medications including aldactone/ furosemide decision  Look at your medications/ make sure you are taking them all    Consider cardiology evaluation as well    Low salt. Drink more fluids. Elevate  Legs when sitting to at the heart or above.    Return to our clinic fasting in 8/2018          Follow-ups after your visit        Additional Services     GASTROENTEROLOGY ADULT REF CONSULT ONLY       Preferred Location: [facility] [phone]/ Dr Samano      Please be aware that coverage of these services is subject to the terms and limitations of your health insurance plan.  Call member services at your health plan with any benefit or coverage questions.  Any procedures must be performed at a [facility] facility OR coordinated by your clinic's referral office.    Please bring the following with you to your appointment:    (1) Any X-Rays, CTs or MRIs which have been performed.  Contact the facility where they were done to arrange for  prior to your scheduled appointment.    (2) List of current medications   (3) This referral request   (4) Any documents/labs given to you for this referral                  Follow-up notes from your care team     Return in about 4 weeks (around 8/14/2018).      Your next 10 appointments already scheduled     Aug 13, 2018  9:45 AM CDT   Office Visit  "with Liz Chapin MD   [facility] ([facility])    [address]  [address]  [facility] [address] [phone]              Who to contact     If you have questions or need follow up information about today's clinic visit or your schedule please contact [facility] directly at [phone].  Normal or non-critical lab and imaging results will be communicated to you by MyChart, letter or phone within 4 business days after the clinic has received the results. If you do not hear from us within 7 days, please contact the clinic through MyChart or phone. If you have a critical or abnormal lab result, we will notify you by phone as soon as possible.  Submit refill requests through [facility] or call your pharmacy and they will forward the refill request to us. Please allow 3 business days for your refill to be completed.          Additional Information About Your Visit        Care EveryWhere ID     This is your Care EveryWhere ID. This could be used by other organizations to access your [facility] medical records  [id number]        Your Vitals Were     Pulse Temperature Respirations Height Last Period Pulse Oximetry    112 98.3  F (36.8  C) (Oral) 16 1.613 m (5' 3.5\") (LMP Unknown) 88%    BMI (Body Mass Index)                   31.39 kg/m2            Blood Pressure from Last 3 Encounters:   07/17/18 90/58   06/22/18 124/58   02/21/18 126/68    Weight from Last 3 Encounters:   07/17/18 81.6 kg (180 lb)   06/22/18 82.4 kg (181 lb 9.6 oz)   02/21/18 81.6 kg (180 lb)              We Performed the Following     CL AFF HEMOGRAM/PLATE/DIFF ([facility])     COMPREHENSIVE METABOLIC PANEL ([facility]) XCMP     EKG 12-lead complete w/read - Clinics     GASTROENTEROLOGY ADULT REF CONSULT ONLY     HCL  Urinalysis, Routine ([facility])     Urine Culture Aerobic Bacterial     VENOUS COLLECTION     XR Chest 2 Views          Today's Medication Changes          These changes are " accurate as of 7/17/18 11:59 PM.  If you have any questions, ask your nurse or doctor.               These medicines have changed or have updated prescriptions.        Dose/Directions    LASIX 20 MG tablet   This may have changed:  how much to take   Used for:  Essential hypertension, benign, Tachycardia, Pedal edema   Generic drug:  furosemide   Changed by:  Liz Chapin MD        Dose:  20 mg   Take 1 tablet (20 mg) by mouth daily   Refills:  0       metoprolol tartrate 25 MG tablet   Commonly known as:  LOPRESSOR   This may have changed:  when to take this   Used for:  Essential hypertension, benign, Tachycardia   Changed by:  Liz Chapin MD        Dose:  25 mg   Take 1 tablet (25 mg) by mouth At Bedtime   Refills:  0       potassium chloride SA 10 MEQ CR tablet   Commonly known as:  K-DUR/KLOR-CON M   This may have changed:  how much to take   Used for:  Essential hypertension, benign, Polyneuropathy associated with underlying disease (H)   Changed by:  Liz Chapin MD        Dose:  20 mEq   Take 2 tablets (20 mEq) by mouth daily   Quantity:  180 tablet   Refills:  1            Where to get your medicines      These medications were sent to [id number] - [place], [place] - [address]  [address] [place] [address] [address], [facility] [address]     Phone:  [phone]     potassium chloride SA 10 MEQ CR tablet                Primary Care Provider Office Phone # Fax #    Liz Chapin [phone] [address] [address]        Equal Access to Services     HILARY GAR AH: Hadii gato ku hadasho Soomaali, waaxda luqadaha, qaybta kaalmada adeegyada, mateo hooks . So [facility] [phone].    ATENCIÓN: Si habla español, tiene a ta disposición servicios gratuitos de asistencia lingüística. Llame al [phone].    We comply with applicable federal civil rights laws and [place] laws. We do not discriminate on the basis of race,  color, national origin, age, disability, sex, sexual orientation, or gender identity.            Thank you!     Thank you for choosing [facility]  for your care. Our goal is always to provide you with excellent care. Hearing back from our patients is one way we can continue to improve our services. Please take a few minutes to complete the written survey that you may receive in the mail after your visit with us. Thank you!             Your Updated Medication List - Protect others around you: Learn how to safely use, store and throw away your medicines at www.disposemymeds.org.          This list is accurate as of 7/17/18 11:59 PM.  Always use your most recent med list.                   Brand Name Dispense Instructions for use Diagnosis    ARNUITY ELLIPTA 100 MCG/ACT Aepb inhalation powder   Generic drug:  fluticasone furoate      Take 100 mcg by mouth daily    IPF (idiopathic pulmonary fibrosis) (H)       aspirin 81 MG tablet     0    1 tab po QD (Once per day)    Essential hypertension, benign       AZOPT 1 % ophthalmic susp   Generic drug:  brinzolamide      1 DROP BOTH EYES BID (am and hs)        dorzolamide 2 % ophthalmic solution    TRUSOPT      Low-tension glaucoma of both eyes, unspecified glaucoma stage       EPINEPHrine 0.3 MG/0.3ML injection 2-pack    EPIPEN 2-LALA    2 each    Inject 0.3 mLs (0.3 mg) into the muscle once as needed for anaphylaxis    Sting of hornets, wasps, and bees as the cause of poisoning and toxic reactions(E905.3)       ferrous gluconate 324 (38 Fe) MG tablet    FERGON     Take 1 tablet (324 mg) by mouth daily (with breakfast)    Other iron deficiency anemia, Restless leg syndrome       hydrocortisone 0.2 % cream    WESTCORT    45 g    Apply sparingly to affected area as needed up to twice daily    Other eczema       LASIX 20 MG tablet   Generic drug:  furosemide      Take 1 tablet (20 mg) by mouth daily    Essential hypertension, benign, Tachycardia, Pedal  edema       losartan 50 MG tablet    COZAAR    45 tablet    Take 0.5 tablets (25 mg) by mouth daily    Essential hypertension, benign, 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)       LYRICA 75 MG capsule   Generic drug:  pregabalin      Take 150 mg by mouth nightly as needed        metFORMIN 500 MG 24 hr tablet    GLUCOPHAGE-XR    360 tablet    Take 4 tablets (2,000 mg) by mouth daily (with dinner)    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), Obesity (BMI 30.0-34.9)       metoprolol tartrate 25 MG tablet    LOPRESSOR     Take 1 tablet (25 mg) by mouth At Bedtime    Essential hypertension, benign, Tachycardia       neomycin-polymyxin-dexamethasone 3.5-59139-4.1 Susp ophthalmic susp    MAXITROL      Open-angle glaucoma of both eyes, unspecified glaucoma stage, unspecified open-angle glaucoma type       omeprazole 20 MG CR capsule    priLOSEC     Take 20 mg by mouth daily    Gastroesophageal reflux disease without esophagitis       ONETOUCH ULTRA test strip   Generic drug:  blood glucose monitoring     100 each    USE TO TEST BLOOD SUGARS ONCE DAILY AS DIRECTED    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)       potassium chloride SA 10 MEQ CR tablet    K-DUR/KLOR-CON M    180 tablet    Take 2 tablets (20 mEq) by mouth daily    Essential hypertension, benign, Polyneuropathy associated with underlying disease (H)       PROAIR  (90 Base) MCG/ACT Inhaler   Generic drug:  albuterol      INHALE 2 PUFF BY INHALATION ROUTE EVERY 4 - 6 HOURS AS NEEDED        RESTASIS 0.05 % ophthalmic emulsion   Generic drug:  cycloSPORINE      Place 1 drop into both eyes as needed        rOPINIRole 0.25 MG tablet    REQUIP     Take 0.25 mg by mouth as needed    Restless leg syndrome       simvastatin 20 MG tablet    ZOCOR    90 tablet    Take 1 tablet (20 mg) by mouth At Bedtime    Mixed hyperlipidemia, Obesity (BMI 30.0-34.9), Diabetes mellitus type 2 with neurological  manifestations (H)       XALATAN 0.005 % ophthalmic solution   Generic drug:  latanoprost     0    1 drop  bot eyes at hs    Open-angle glaucoma, unspecified

## 2018-07-17 NOTE — LETTER
[facility]   A partner of [facility]              [facility]  [facility] [address]  [place]  [address]        For Emergencies:  Call 911      For Clinic Appointments:   [phone]                       Current Outpatient Prescriptions   Medication     ALBUTEROL 108 (90 BASE) MCG/ACT inhaler     ARNUITY ELLIPTA 100 MCG/ACT AEPB inhalation powder     ASPIRIN 81 MG OR TABS     AZOPT 1 % OP SUSP     dorzolamide (TRUSOPT) 2 % ophthalmic solution     EPINEPHrine (EPIPEN 2-LALA) 0.3 MG/0.3ML injection     ferrous gluconate (FERGON) 324 (38 FE) MG tablet     furosemide (LASIX) 20 MG tablet     hydrocortisone (WESTCORT) 0.2 % cream     losartan (COZAAR) 50 MG tablet     LYRICA 75 MG capsule     metFORMIN (GLUCOPHAGE-XR) 500 MG 24 hr tablet     metoprolol tartrate (LOPRESSOR) 25 MG tablet     neomycin-polymyxin-dexamethasone (MAXITROL) 3.5-83192-9.1 SUSP ophthalmic susp     omeprazole (PRILOSEC) 20 MG CR capsule     ONETOUCH ULTRA test strip     potassium chloride SA (K-DUR/KLOR-CON M) 10 MEQ CR tablet     RESTASIS 0.05 % ophthalmic emulsion     rOPINIRole (REQUIP) 0.25 MG tablet     simvastatin (ZOCOR) 20 MG tablet     XALATAN 0.005 % OP SOLN     No current facility-administered medications for this visit.

## 2018-07-17 NOTE — PROGRESS NOTES
SUBJECTIVE: 84 year old female complaining of 2 issues:    1. Concerned about another bladder infection. Brought in a urine sample from home collected 5 hours ago. No dysuria or frequency. Having trouble initiating urination.    2. Bilateral ankle edema noted  for 1 year(s). Hospitalized summer 2017 and liver cirrhosis diagnosis made. Treated with aldactone with relief. She thinks her edema is worse the past month. Treated by GI specialists at [facility] with furosemide and recent dose increase to 20 mgm at last visit.   The patient describes tachycardia always noted and the use of betablocker which she takes at bedtime as needed. I reviewed EKG's from 2008 ( 3 in Epic) all showing mild tachycardia.   The patient denies a history of SOB, cough, chest pain or diaphoresis.     Smoking history: No.   Relevant past medical history: positive for hypertension and elevated cholesterol I have treated along with type 2 diabetes on metformin. Previous iron deficiency currently stopping iron and her constipation responded.    Current Outpatient Prescriptions   Medication     ALBUTEROL 108 (90 BASE) MCG/ACT inhaler     ARNUITY ELLIPTA 100 MCG/ACT AEPB inhalation powder     ASPIRIN 81 MG OR TABS     AZOPT 1 % OP SUSP     dorzolamide (TRUSOPT) 2 % ophthalmic solution     EPINEPHrine (EPIPEN 2-LALA) 0.3 MG/0.3ML injection     ferrous gluconate (FERGON) 324 (38 FE) MG tablet     furosemide (LASIX) 20 MG tablet     hydrocortisone (WESTCORT) 0.2 % cream     losartan (COZAAR) 50 MG tablet     LYRICA 75 MG capsule     metFORMIN (GLUCOPHAGE-XR) 500 MG 24 hr tablet     metoprolol tartrate (LOPRESSOR) 25 MG tablet     neomycin-polymyxin-dexamethasone (MAXITROL) 3.5-75774-0.1 SUSP ophthalmic susp     omeprazole (PRILOSEC) 20 MG CR capsule     ONETOUCH ULTRA test strip     potassium chloride SA (K-DUR/KLOR-CON M) 10 MEQ CR tablet     RESTASIS 0.05 % ophthalmic emulsion     rOPINIRole (REQUIP) 0.25 MG tablet     simvastatin (ZOCOR) 20 MG tablet      XALATAN 0.005 % OP SOLN     [DISCONTINUED] furosemide (LASIX) 20 MG tablet     [DISCONTINUED] metoprolol tartrate (LOPRESSOR) 25 MG tablet     No current facility-administered medications for this visit.      Specialists involved with  care:    Dr Law; restless legs  Dr Anderson: pulmonary issues  [facility] Dr Cuadra  : liver cirrhosis nonalcoholic fatty liver/ pedal edema    OBJECTIVE: The patient appears healthy, alert, no distress, cooperative, smiling and over weight.   EARS: negative  NOSE/SINUS: Nares normal. Septum midline. Mucosa normal. No drainage or sinus tenderness.   THROAT: normal   NECK:Neck supple. No adenopathy. Thyroid symmetric, normal size,, Carotids without bruits.   CHEST: Regular rate and  rhythm. S1 and S2 normal, no murmurs, clicks, gallops or rubs. No edema or JVD. Chest is clear; no wheezes or rales.  ABD; Obese. The abdomen is soft without tenderness, guarding, mass or organomegaly. Bowel sounds are normal. No CVA tenderness or inguinal adenopathy noted.  EXT: 1+ edema ankle and feet. The lower extremities are normal and reveal no sign of DVT. Calves and thighs are soft and non tender, color is normal,  or redness. Kay's sign is negative.  Pedal pulses are normal.    CXR: fibrosis noted/ unchanged/ sent to radiology  EKG: unchanged/ sinus tachycardia since 2008  CBC: WNL    ASSESSMENT: (R00.0) Tachycardia  (primary encounter diagnosis)  Plan: furosemide (LASIX) 20 MG tablet, EKG 12-lead         complete w/read - Clinics, CL AFF         HEMOGRAM/PLATE/DIFF (BFP), VENOUS COLLECTION,         XR Chest 2 Views, COMPREHENSIVE METABOLIC PANEL        ([facility]) XCMP, metoprolol tartrate (LOPRESSOR)         25 MG tablet, GASTROENTEROLOGY ADULT REF         CONSULT ONLY        Chronic issue. Consider bid metoprolol daily. She will start after labs return    (R60.0) Pedal edema  Comment: concern about her medication/ potassium may not be doing daily  Plan: furosemide (LASIX) 20 MG tablet, EKG 12-lead          complete w/read - Clinics, CL AFF         HEMOGRAM/PLATE/DIFF ([facility]FP), VENOUS COLLECTION,         XR Chest 2 Views, COMPREHENSIVE METABOLIC PANEL        ([facility]) XCMP, GASTROENTEROLOGY ADULT REF         CONSULT ONLY        Check at home/ await labs. No change in medications for now. Back to liver specialsts    (I10) Essential hypertension, benign  Plan: furosemide (LASIX) 20 MG tablet, COMPREHENSIVE         METABOLIC PANEL ([facility]) XCMP, metoprolol         tartrate (LOPRESSOR) 25 MG tablet, potassium         chloride SA (K-DUR/KLOR-CON M) 10 MEQ CR tablet        I have reviewed the patient's medical history in detail and updated the computerized patient record.      (K76.0) Fatty liver disease, nonalcoholic  Plan: GASTROENTEROLOGY ADULT REF CONSULT ONLY        I have reviewed the patient's medical history in detail and updated the computerized patient record.      (R39.11) Urinary hesitancy  Plan: HCL  Urinalysis, Routine (BFP), Urine Culture         Aerobic Bacterial        Await results    (K74.60) Cirrhosis, non-alcoholic (H)  Plan: I have reviewed the patient's medical history in detail and updated the computerized patient record.      (G63) Polyneuropathy associated with underlying disease (H)  Plan: diabetes/ recheck 1 month fasting

## 2018-07-18 NOTE — TELEPHONE ENCOUNTER
"Called patient. She looked for her potassium bottle and found an empty one/ stopped it maybe 2 months ago. I sent a new script and she will have a neighbor go over and start taking 2 daily. I recommended ER and she refused. \"I feel fine\"  Furosemide increase by GI reviewed as accurate. Called daughter with concern about her medications, remembering to take it, this critical value of potassium, etc. She will stop over today.    "

## 2018-07-18 NOTE — TELEPHONE ENCOUNTER
patient called the lab phone line and left the following request.    [facility] called with a critical Value alert:    Pt Potassium is low    Please review the results ASAP  Thank you   Cruz  [phone] (home)

## 2018-07-18 NOTE — PATIENT INSTRUCTIONS
Await labs and urine culture    Consult back to [facility] to discuss diuretic medications including aldactone/ furosemide decision  Look at your medications/ make sure you are taking them all    Consider cardiology evaluation as well    Low salt. Drink more fluids. Elevate  Legs when sitting to at the heart or above.    Return to our clinic fasting in 8/2018

## 2018-07-18 NOTE — TELEPHONE ENCOUNTER
Reviewed her need to take potassium. Has the pills at her home. Will take potassium double dose for 4 doses and monitor.  Has appointment with GI specialist July 30 for recheck.     Reviewed ER visit and IV potassium as a treatment plan. She would like to do home oral therapy.  She reports daughter and she have planned these visit.

## 2018-07-20 NOTE — TELEPHONE ENCOUNTER
Notify patient her urine culture came back with a low grade bladder infection. I have sent over to her pharmacy a  Prescription. We can recheck this at her upcoming August appointment.

## 2018-08-13 NOTE — LETTER
August 16, 2018      Evy Song  [address] [facility] [address]        Dear ,    We are writing to inform you of your test results.    Negative urine culture for an infection.    Stable liver function testing. I have sent these to Dr Anderson and your GI specialists.  Take this sheet to your next appointment.    Normal thyroid level.  Good diabetes control with good cholesterol values.        If you have any questions or concerns, please call the clinic at the number listed above.       Sincerely,        Liz Chapin MD

## 2018-08-13 NOTE — MR AVS SNAPSHOT
"              After Visit Summary   8/13/2018    Evy Song    MRN: [id number]           Patient Information     Date Of Birth          6/23/1934        Visit Information        Provider Department      8/13/2018 10:00 AM Liz Chapin MD [facility]        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      Who to contact     If you have questions or need follow up information about today's clinic visit or your schedule please contact [facility] directly at [phone].  Normal or non-critical lab and imaging results will be communicated to you by MyChart, letter or phone within 4 business days after the clinic has received the results. If you do not hear from us within 7 days, please contact the clinic through MyChart or phone. If you have a critical or abnormal lab result, we will notify you by phone as soon as possible.  Submit refill requests through [facility] or call your pharmacy and they will forward the refill request to us. Please allow 3 business days for your refill to be completed.          Additional Information About Your Visit        Care EveryWhere ID     This is your Care EveryWhere ID. This could be used by other organizations to access your [facility] medical records  [id number]        Your Vitals Were     Pulse Temperature Height Last Period Pulse Oximetry Breastfeeding?    69 97.9  F (36.6  C) (Oral) 1.613 m (5' 3.5\") (LMP Unknown) 90% No    BMI (Body Mass Index)                   29.5 kg/m2            Blood Pressure from " Last 3 Encounters:   08/13/18 116/58   07/17/18 90/58   06/22/18 124/58    Weight from Last 3 Encounters:   08/13/18 76.7 kg (169 lb 3.2 oz)   07/17/18 81.6 kg (180 lb)   06/22/18 82.4 kg (181 lb 9.6 oz)              We Performed the Following     Albumin Random Urine Quantitative with Creat Ratio     COMPREHENSIVE METABOLIC PANEL (QUEST) XCMP     HCL  Urinalysis, Routine (BFP)     Hemoglobin A1c (BFP)     Lipid Profile (QUEST)     TSH with free T4 reflex (QUEST)     Urine Culture Aerobic Bacterial     VENOUS COLLECTION          Today's Medication Changes          These changes are accurate as of 8/13/18 10:51 AM.  If you have any questions, ask your nurse or doctor.               Start taking these medicines.        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 [place] [address] [address], [facility] [address]     Phone:  [phone]     metFORMIN 500 MG 24 hr tablet         Some of these will need a paper prescription and others can be bought over the counter.  Ask your nurse if you have questions.     You don't need a prescription for these medications     ACE/ARB/ARNI NOT PRESCRIBED (INTENTIONAL)                Primary Care Provider Office Phone # Fax #    Liz Chapin [phone]       [facility] [address] [address]        Equal Access to Services     [facility]: Hadii gato ku hadasho Soomaali, waaxda luqadaha, qaybta kaalmada adeegbest, mateo lepe. So [facility] [phone].    ATENCIÓN: Si habla español, tiene a ta disposición servicios gratuitos de asistencia lingüística. Llame al [phone].    We comply with applicable  federal civil rights laws and [place] laws. We do not discriminate on the basis of race, color, national origin, age, disability, sex, sexual orientation, or gender identity.            Thank you!     Thank you for choosing [facility]  for your care. Our goal is always to provide you with excellent care. Hearing back from our patients is one way we can continue to improve our services. Please take a few minutes to complete the written survey that you may receive in the mail after your visit with us. Thank you!             Your Updated Medication List - Protect others around you: Learn how to safely use, store and throw away your medicines at www.disposemymeds.org.          This list is accurate as of 8/13/18 10:51 AM.  Always use your most recent med list.                   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)       ARNUITY ELLIPTA 100 MCG/ACT inhaler   Generic drug:  fluticasone furoate      Take 100 mcg by mouth daily    IPF (idiopathic pulmonary fibrosis) (H)       aspirin 81 MG tablet     0    1 tab po QD (Once per day)    Essential hypertension, benign       AZOPT 1 % ophthalmic susp   Generic drug:  brinzolamide      1 DROP BOTH EYES BID (am and hs)        dorzolamide 2 % ophthalmic solution    TRUSOPT      Low-tension glaucoma of both eyes, unspecified glaucoma stage       EPINEPHrine 0.3 MG/0.3ML injection 2-pack    EPIPEN 2-ALLA    2 each    Inject 0.3 mLs (0.3 mg) into the muscle once as needed for anaphylaxis    Sting of hornets, wasps, and bees as the cause of poisoning and toxic reactions(E905.3)       ferrous gluconate 324 (38 Fe) MG tablet    FERGON     Take 1 tablet (324 mg) by mouth daily (with breakfast)    Other iron deficiency anemia, Restless leg syndrome       hydrocortisone 0.2 % cream    [facility]     45 g    Apply sparingly to affected area as needed up to twice daily    Other eczema       LASIX 20 MG tablet   Generic drug:  furosemide      Take 1 tablet (20 mg) by mouth daily    Essential hypertension, benign, Tachycardia, Pedal edema       LYRICA 75 MG capsule   Generic drug:  pregabalin      Take 150 mg by mouth nightly as needed        metFORMIN 500 MG 24 hr tablet    GLUCOPHAGE-XR    360 tablet    Take 4 tablets (2,000 mg) by mouth daily (with dinner)    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), Obesity (BMI 30.0-34.9)       metoprolol tartrate 25 MG tablet    LOPRESSOR     Take 1 tablet (25 mg) by mouth At Bedtime    Essential hypertension, benign, Tachycardia       neomycin-polymyxin-dexamethasone 3.5-94501-6.1 Susp ophthalmic susp    MAXITROL      Open-angle glaucoma of both eyes, unspecified glaucoma stage, unspecified open-angle glaucoma type       omeprazole 20 MG CR capsule    priLOSEC     Take 20 mg by mouth daily    Gastroesophageal reflux disease without esophagitis       ONETOUCH ULTRA test strip   Generic drug:  blood glucose monitoring     100 each    USE TO TEST BLOOD SUGARS ONCE DAILY AS DIRECTED    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)       potassium chloride SA 10 MEQ CR tablet    K-DUR/KLOR-CON M    180 tablet    Take 2 tablets (20 mEq) by mouth daily    Essential hypertension, benign, Polyneuropathy associated with underlying disease (H)       PROAIR  (90 Base) MCG/ACT inhaler   Generic drug:  albuterol      INHALE 2 PUFF BY INHALATION ROUTE EVERY 4 - 6 HOURS AS NEEDED        RESTASIS 0.05 % ophthalmic emulsion   Generic drug:  cycloSPORINE      Place 1 drop into both eyes as needed        rOPINIRole 0.25 MG tablet    REQUIP     Take 0.25 mg by mouth as needed    Restless leg syndrome       simvastatin 20 MG tablet    ZOCOR    90 tablet    Take 1 tablet (20 mg) by mouth At Bedtime    Mixed hyperlipidemia, Obesity (BMI 30.0-34.9),  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

## 2018-08-13 NOTE — PROGRESS NOTES
SUBJECTIVE:  Evy Song is an 84 year old female who presents for evaluation and treatment   of Type 2 diabetes mellitus. Age at diagnosis 75. Family history   positive for diabetes in the patient s father.   Previous treatment modalities employed include diet, oral agents, exercise and ASA.   Current treatment includes diet, oral agents, exercise and ASA.     Current monitoring regimen: home blood tests - once daily  Home blood sugar records: 100-220  Last HgbA1c: 5.8  Diabetic complications: peripheral neuropathy and cardiovascular disease  Cardiovascular risk factors: family history, lipids, diabetes mellitus, hypertension, obesity, sedentary life style, stress and tachycardia/ nonalcoholic cirrhosis    Current Outpatient Prescriptions   Medication     ACE/ARB/ARNI NOT PRESCRIBED, INTENTIONAL,     ARNUITY ELLIPTA 100 MCG/ACT AEPB inhalation powder     ASPIRIN 81 MG OR TABS     ferrous gluconate (FERGON) 324 (38 FE) MG tablet     furosemide (LASIX) 20 MG tablet     hydrocortisone (WESTCORT) 0.2 % cream     LYRICA 75 MG capsule     metFORMIN (GLUCOPHAGE-XR) 500 MG 24 hr tablet     metoprolol tartrate (LOPRESSOR) 25 MG tablet     omeprazole (PRILOSEC) 20 MG CR capsule     potassium chloride SA (K-DUR/KLOR-CON M) 10 MEQ CR tablet     rOPINIRole (REQUIP) 0.25 MG tablet     simvastatin (ZOCOR) 20 MG tablet     spironolactone (ALDACTONE) 50 MG tablet     ALBUTEROL 108 (90 BASE) MCG/ACT inhaler     AZOPT 1 % OP SUSP     dorzolamide (TRUSOPT) 2 % ophthalmic solution     EPINEPHrine (EPIPEN 2-LALA) 0.3 MG/0.3ML injection     neomycin-polymyxin-dexamethasone (MAXITROL) 3.5-43499-7.1 SUSP ophthalmic susp     ONETOUCH ULTRA test strip     RESTASIS 0.05 % ophthalmic emulsion     XALATAN 0.005 % OP SOLN     [DISCONTINUED] metFORMIN (GLUCOPHAGE-XR) 500 MG 24 hr tablet     No current facility-administered medications for this visit.      Allergies   Allergen Reactions     Monosodium Glutamate Cough and Shortness Of Breath      "Timolol Rash     Bee Venom      hives,anaphylaxis     Benadryl [Diphenhydramine] Other (See Comments)     Lightheaded     Celecoxib      spasms       Social History   Substance Use Topics     Smoking status: Never Smoker     Smokeless tobacco: Never Used     Alcohol use No       Review Of Systems  Skin: negative  Eyes: macular degeneration  Ears/Nose/Throat: negative  Respiratory: pulmonary fibrosis followed by DR nAderson  Cardiovascular: hypertension and elevated cholesterol/ started metoprolol bid last visit  Gastrointestinal: Fatty liver followed by [facility]. Recent medication changes  Genitourinary: frequency  Musculoskeletal: generalized weakness and unconditioned/ using a cane. TCO following knee  Neurologic: numbness or tingling of feet and restless legs  Psychiatric: excessive stress-single, poor diet and poor self care/ daughters getting involved  Hematologic/Lymphatic/Immunologic: negative  Endocrine: diabetes    OBJECTIVE:  /58 (BP Location: Left arm, Patient Position: Chair, Cuff Size: Adult Large)  Pulse 69  Temp 97.9  F (36.6  C) (Oral)  Ht 1.613 m (5' 3.5\")  Wt 76.7 kg (169 lb 3.2 oz)  LMP  (LMP Unknown)  SpO2 90%  Breastfeeding? No  BMI 29.5 kg/m2  General appearance: healthy, alert, no distress, cooperative, smiling and over weight/ weight loss noted  Skin: Skin color, texture, turgor normal. No rashes or lesions.  Eyes: conjunctivae/corneas clear. PERRL, EOM's intact. Fundi benign  Ears: negative  Oropharynx: Lips, mucosa, and tongue normal. Teeth and gums normal.  Neck: Neck supple. No adenopathy. Thyroid symmetric, normal size,, Carotids without bruits.  Lungs: negative, Percussion normal. Good diaphragmatic excursion. Lungs clear  Heart: negative, PMI normal. No lifts, heaves, or thrills. RRR. No murmurs, clicks gallops or rub  Abdomen: Abdomen soft, non-tender. BS normal. No masses, organomegaly, positive findings: obese  Extremities: Extremities normal. No deformities, edema, or skin " discoloration.  Peripheral pulses: radial=4/4, femoral=4/4, popliteal=4/4, dorsalis pedis=4/4,  Neuro: Gait normal. Reflexes normal and symmetric. Sensation grossly WNL.  BMI : Body mass index is 29.5 kg/(m^2).    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 Reviewed concepts of diabetes self-management stressing the primary   role of the patient in monitoring and maintaining control of   diabetes.      (R30.0) Dysuria  Comment: await culture  Plan: HCL  Urinalysis, Routine (BFP), Urine Culture         Aerobic Bacterial        Patient was instructed to drink plenty of fluids, urinate frequently and to contact the office promptly should she notice fever greater than 102, increase in discomfort, skin rash, lack of improvement after 2 days of treatment, or the appearance of new symptoms.      (E78.2) Mixed hyperlipidemia  Plan: VENOUS COLLECTION, Lipid Profile (QUEST)        Await labs    (I10) Essential hypertension, benign  Plan: VENOUS COLLECTION, COMPREHENSIVE METABOLIC         PANEL (QUEST) XCMP        Await labs    (Z76.0) Encounter for medication refill  Plan: Hemoglobin A1c (BFP), VENOUS COLLECTION,         Albumin Random Urine Quantitative with Creat         Ratio, Lipid Profile (QUEST), COMPREHENSIVE         METABOLIC PANEL (QUEST) XCMP, TSH with free T4         reflex (QUEST)            (E66.9) Obesity (BMI 30.0-34.9)  Plan: metFORMIN (GLUCOPHAGE-XR) 500 MG 24 hr tablet        Good weight loss noted

## 2018-08-13 NOTE — NURSING NOTE
Evy Song is here today for a fasting medication recheck and a recheck of her UTI.    Pre-visit Screening:    Immunizations:  up to date  Colonoscopy:  No longer needs  Mammogram: Does not think she needs any further mammograms  Asthma Action Test/Plan:  NA  PHQ-2 Score:     PHQ-2 ( 1999 Pf[employer]) 8/13/2018 1/21/2015   Q1: Little interest or pleasure in doing things 0 0   Q2: Feeling down, depressed or hopeless 0 0   PHQ-2 Score 0 0       GAD7:  NA  Fall Risk Assessment: is completed today (No Falls)    Questioned patient about current smoking habits Pt. has never smoked.    Is it ok to leave a detailed message on home phone's voice mail for today's visit only? Yes     Phone # [phone] (home)      Carolynn Rodgers CMA

## 2019-01-01 ENCOUNTER — APPOINTMENT (OUTPATIENT)
Dept: PHYSICAL THERAPY | Facility: CLINIC | Age: 84
DRG: 603 | End: 2019-01-01
Payer: COMMERCIAL

## 2019-01-01 ENCOUNTER — APPOINTMENT (OUTPATIENT)
Dept: CT IMAGING | Facility: CLINIC | Age: 84
DRG: 603 | End: 2019-01-01
Attending: EMERGENCY MEDICINE
Payer: COMMERCIAL

## 2019-01-01 ENCOUNTER — APPOINTMENT (OUTPATIENT)
Dept: GENERAL RADIOLOGY | Facility: CLINIC | Age: 84
DRG: 603 | End: 2019-01-01
Attending: EMERGENCY MEDICINE
Payer: COMMERCIAL

## 2019-01-01 ENCOUNTER — APPOINTMENT (OUTPATIENT)
Dept: CT IMAGING | Facility: CLINIC | Age: 84
DRG: 871 | End: 2019-01-01
Attending: EMERGENCY MEDICINE
Payer: COMMERCIAL

## 2019-01-01 ENCOUNTER — NURSING HOME VISIT (OUTPATIENT)
Dept: GERIATRICS | Facility: CLINIC | Age: 84
End: 2019-01-01
Payer: COMMERCIAL

## 2019-01-01 ENCOUNTER — APPOINTMENT (OUTPATIENT)
Dept: GENERAL RADIOLOGY | Facility: CLINIC | Age: 84
DRG: 871 | End: 2019-01-01
Payer: COMMERCIAL

## 2019-01-01 ENCOUNTER — TRANSFERRED RECORDS (OUTPATIENT)
Dept: FAMILY MEDICINE | Facility: CLINIC | Age: 84
End: 2019-01-01

## 2019-01-01 ENCOUNTER — HOSPITAL ENCOUNTER (INPATIENT)
Facility: CLINIC | Age: 84
LOS: 1 days | DRG: 871 | End: 2019-02-20
Attending: EMERGENCY MEDICINE | Admitting: INTERNAL MEDICINE
Payer: COMMERCIAL

## 2019-01-01 ENCOUNTER — APPOINTMENT (OUTPATIENT)
Dept: CARDIOLOGY | Facility: CLINIC | Age: 84
DRG: 871 | End: 2019-01-01
Attending: INTERNAL MEDICINE
Payer: COMMERCIAL

## 2019-01-01 ENCOUNTER — APPOINTMENT (OUTPATIENT)
Dept: GENERAL RADIOLOGY | Facility: CLINIC | Age: 84
DRG: 871 | End: 2019-01-01
Attending: INTERNAL MEDICINE
Payer: COMMERCIAL

## 2019-01-01 ENCOUNTER — APPOINTMENT (OUTPATIENT)
Dept: PHYSICAL THERAPY | Facility: CLINIC | Age: 84
DRG: 603 | End: 2019-01-01
Attending: INTERNAL MEDICINE
Payer: COMMERCIAL

## 2019-01-01 ENCOUNTER — APPOINTMENT (OUTPATIENT)
Dept: ULTRASOUND IMAGING | Facility: CLINIC | Age: 84
DRG: 871 | End: 2019-01-01
Attending: INTERNAL MEDICINE
Payer: COMMERCIAL

## 2019-01-01 ENCOUNTER — APPOINTMENT (OUTPATIENT)
Dept: GENERAL RADIOLOGY | Facility: CLINIC | Age: 84
DRG: 871 | End: 2019-01-01
Attending: EMERGENCY MEDICINE
Payer: COMMERCIAL

## 2019-01-01 ENCOUNTER — OFFICE VISIT (OUTPATIENT)
Dept: FAMILY MEDICINE | Facility: CLINIC | Age: 84
End: 2019-01-01

## 2019-01-01 ENCOUNTER — PATIENT OUTREACH (OUTPATIENT)
Dept: CARE COORDINATION | Facility: CLINIC | Age: 84
End: 2019-01-01

## 2019-01-01 ENCOUNTER — TELEPHONE (OUTPATIENT)
Dept: FAMILY MEDICINE | Facility: CLINIC | Age: 84
End: 2019-01-01

## 2019-01-01 ENCOUNTER — HOSPITAL ENCOUNTER (OUTPATIENT)
Dept: LAB | Facility: CLINIC | Age: 84
Discharge: HOME | End: 2019-01-25
Attending: INTERNAL MEDICINE | Admitting: INTERNAL MEDICINE
Payer: COMMERCIAL

## 2019-01-01 ENCOUNTER — MEDICAL CORRESPONDENCE (OUTPATIENT)
Dept: HEALTH INFORMATION MANAGEMENT | Facility: CLINIC | Age: 84
End: 2019-01-01

## 2019-01-01 ENCOUNTER — HOSPITAL ENCOUNTER (INPATIENT)
Facility: CLINIC | Age: 84
LOS: 9 days | Discharge: NURSING, REHAB OR LONG-TERM CARE | DRG: 603 | End: 2019-02-14
Attending: EMERGENCY MEDICINE | Admitting: INTERNAL MEDICINE
Payer: COMMERCIAL

## 2019-01-01 VITALS
HEART RATE: 87 BPM | WEIGHT: 181.8 LBS | OXYGEN SATURATION: 96 % | RESPIRATION RATE: 18 BRPM | SYSTOLIC BLOOD PRESSURE: 124 MMHG | TEMPERATURE: 98 F | DIASTOLIC BLOOD PRESSURE: 68 MMHG | BODY MASS INDEX: 31.21 KG/M2

## 2019-01-01 VITALS
TEMPERATURE: 99 F | BODY MASS INDEX: 32.64 KG/M2 | SYSTOLIC BLOOD PRESSURE: 112 MMHG | OXYGEN SATURATION: 92 % | DIASTOLIC BLOOD PRESSURE: 48 MMHG | HEIGHT: 64 IN | HEART RATE: 80 BPM | WEIGHT: 191.2 LBS | RESPIRATION RATE: 18 BRPM

## 2019-01-01 VITALS
HEART RATE: 81 BPM | SYSTOLIC BLOOD PRESSURE: 110 MMHG | RESPIRATION RATE: 16 BRPM | OXYGEN SATURATION: 91 % | DIASTOLIC BLOOD PRESSURE: 64 MMHG | TEMPERATURE: 97.9 F

## 2019-01-01 VITALS
SYSTOLIC BLOOD PRESSURE: 132 MMHG | DIASTOLIC BLOOD PRESSURE: 68 MMHG | WEIGHT: 181 LBS | BODY MASS INDEX: 31.56 KG/M2 | OXYGEN SATURATION: 89 % | TEMPERATURE: 98 F | HEART RATE: 86 BPM | RESPIRATION RATE: 16 BRPM

## 2019-01-01 VITALS
OXYGEN SATURATION: 16 % | SYSTOLIC BLOOD PRESSURE: 109 MMHG | DIASTOLIC BLOOD PRESSURE: 53 MMHG | HEART RATE: 112 BPM | TEMPERATURE: 97.7 F

## 2019-01-01 DIAGNOSIS — J84.112 IPF (IDIOPATHIC PULMONARY FIBROSIS) (H): ICD-10-CM

## 2019-01-01 DIAGNOSIS — G63 POLYNEUROPATHY ASSOCIATED WITH UNDERLYING DISEASE (H): ICD-10-CM

## 2019-01-01 DIAGNOSIS — L08.9 WOUND INFECTION: ICD-10-CM

## 2019-01-01 DIAGNOSIS — K74.60 CIRRHOSIS, NON-ALCOHOLIC (H): ICD-10-CM

## 2019-01-01 DIAGNOSIS — R11.10 VOMITING AND DIARRHEA: ICD-10-CM

## 2019-01-01 DIAGNOSIS — R17 JAUNDICE: ICD-10-CM

## 2019-01-01 DIAGNOSIS — R60.0 PEDAL EDEMA: ICD-10-CM

## 2019-01-01 DIAGNOSIS — R19.7 DIARRHEA, UNSPECIFIED TYPE: ICD-10-CM

## 2019-01-01 DIAGNOSIS — Z60.2 SELF-CARE DEFICIT IN PATIENT LIVING ALONE: Primary | ICD-10-CM

## 2019-01-01 DIAGNOSIS — A41.9 SEVERE SEPSIS (H): ICD-10-CM

## 2019-01-01 DIAGNOSIS — G63 POLYNEUROPATHY ASSOCIATED WITH UNDERLYING DISEASE (H): Primary | ICD-10-CM

## 2019-01-01 DIAGNOSIS — R41.3 MEMORY DIFFICULTIES: ICD-10-CM

## 2019-01-01 DIAGNOSIS — L97.429 DIABETIC ULCER OF LEFT MIDFOOT ASSOCIATED WITH DIABETES MELLITUS DUE TO UNDERLYING CONDITION, UNSPECIFIED ULCER STAGE (H): ICD-10-CM

## 2019-01-01 DIAGNOSIS — R50.9 FEVER, UNSPECIFIED FEVER CAUSE: ICD-10-CM

## 2019-01-01 DIAGNOSIS — I89.0 LYMPHEDEMA OF BOTH LOWER EXTREMITIES: Primary | ICD-10-CM

## 2019-01-01 DIAGNOSIS — S81.809A OPEN WOUND OF LOWER EXTREMITY, UNSPECIFIED LATERALITY, INITIAL ENCOUNTER: ICD-10-CM

## 2019-01-01 DIAGNOSIS — I10 ESSENTIAL HYPERTENSION, BENIGN: ICD-10-CM

## 2019-01-01 DIAGNOSIS — T14.8XXA WOUND INFECTION: ICD-10-CM

## 2019-01-01 DIAGNOSIS — E08.621 DIABETIC ULCER OF LEFT MIDFOOT ASSOCIATED WITH DIABETES MELLITUS DUE TO UNDERLYING CONDITION, UNSPECIFIED ULCER STAGE (H): ICD-10-CM

## 2019-01-01 DIAGNOSIS — R60.0 PEDAL EDEMA: Primary | ICD-10-CM

## 2019-01-01 DIAGNOSIS — K76.6 PORTAL HYPERTENSION (H): ICD-10-CM

## 2019-01-01 DIAGNOSIS — K74.60 CIRRHOSIS (H): ICD-10-CM

## 2019-01-01 DIAGNOSIS — Z87.898 HISTORY OF URINE COLOR CHANGES: ICD-10-CM

## 2019-01-01 DIAGNOSIS — I47.10 SUPRAVENTRICULAR TACHYCARDIA (H): ICD-10-CM

## 2019-01-01 DIAGNOSIS — R19.7 VOMITING AND DIARRHEA: ICD-10-CM

## 2019-01-01 DIAGNOSIS — K75.81 LIVER CIRRHOSIS SECONDARY TO NASH (H): Primary | ICD-10-CM

## 2019-01-01 DIAGNOSIS — K74.60 CIRRHOSIS, NON-ALCOHOLIC (H): Primary | ICD-10-CM

## 2019-01-01 DIAGNOSIS — E87.20 METABOLIC ACIDOSIS: ICD-10-CM

## 2019-01-01 DIAGNOSIS — R53.81 PHYSICAL DECONDITIONING: ICD-10-CM

## 2019-01-01 DIAGNOSIS — E11.49 DIABETES MELLITUS TYPE 2 WITH NEUROLOGICAL MANIFESTATIONS (H): ICD-10-CM

## 2019-01-01 DIAGNOSIS — E86.0 DEHYDRATION: ICD-10-CM

## 2019-01-01 DIAGNOSIS — K74.60 LIVER CIRRHOSIS SECONDARY TO NASH (H): Primary | ICD-10-CM

## 2019-01-01 DIAGNOSIS — E66.811 OBESITY (BMI 30.0-34.9): ICD-10-CM

## 2019-01-01 DIAGNOSIS — J96.01 ACUTE RESPIRATORY FAILURE WITH HYPOXIA (H): ICD-10-CM

## 2019-01-01 DIAGNOSIS — R65.20 SEVERE SEPSIS (H): ICD-10-CM

## 2019-01-01 LAB
% GRANULOCYTES: 73.2 %
AFP SERPL-MCNC: 2.4 UG/L (ref 0–8)
ALBUMIN (URINE): ABNORMAL MG/DL
ALBUMIN FLD-MCNC: 1 G/DL
ALBUMIN SERPL-MCNC: 1.8 G/DL (ref 3.4–5)
ALBUMIN SERPL-MCNC: 2.4 G/DL (ref 3.4–5)
ALBUMIN SERPL-MCNC: 2.6 G/DL (ref 3.4–5)
ALBUMIN SERPL-MCNC: 2.7 G/DL (ref 3.4–5)
ALBUMIN SERPL-MCNC: 2.9 G/DL (ref 3.6–5.1)
ALBUMIN UR-MCNC: 10 MG/DL
ALBUMIN UR-MCNC: NEGATIVE MG/DL
ALBUMIN/GLOB SERPL: 1.5 (CALC) (ref 1–2.5)
ALP SERPL-CCNC: 113 U/L (ref 40–150)
ALP SERPL-CCNC: 115 U/L (ref 40–150)
ALP SERPL-CCNC: 151 U/L (ref 33–130)
ALP SERPL-CCNC: 162 U/L (ref 40–150)
ALP SERPL-CCNC: 25 U/L (ref 40–150)
ALP SERPL-CCNC: 49 U/L (ref 40–150)
ALP SERPL-CCNC: 60 U/L (ref 40–150)
ALP SERPL-CCNC: 71 U/L (ref 40–150)
ALT SERPL W P-5'-P-CCNC: 13 U/L (ref 0–50)
ALT SERPL W P-5'-P-CCNC: 22 U/L (ref 0–50)
ALT SERPL W P-5'-P-CCNC: 25 U/L (ref 0–50)
ALT SERPL W P-5'-P-CCNC: 26 U/L (ref 0–50)
ALT SERPL W P-5'-P-CCNC: 36 U/L (ref 0–50)
ALT SERPL W P-5'-P-CCNC: 39 U/L (ref 0–50)
ALT SERPL W P-5'-P-CCNC: 7 U/L (ref 0–50)
ALT SERPL-CCNC: 34 U/L (ref 6–29)
AMMONIA PLAS-SCNC: 18 UMOL/L (ref 10–50)
AMMONIA PLAS-SCNC: 34 UMOL/L (ref 10–50)
AMMONIA PLAS-SCNC: 78 UMOL/L (ref 10–50)
ANION GAP SERPL CALCULATED.3IONS-SCNC: 10 MMOL/L (ref 3–14)
ANION GAP SERPL CALCULATED.3IONS-SCNC: 11 MMOL/L (ref 3–14)
ANION GAP SERPL CALCULATED.3IONS-SCNC: 13 MMOL/L (ref 3–14)
ANION GAP SERPL CALCULATED.3IONS-SCNC: 25 MMOL/L (ref 3–14)
ANION GAP SERPL CALCULATED.3IONS-SCNC: 6 MMOL/L (ref 3–14)
ANION GAP SERPL CALCULATED.3IONS-SCNC: 6 MMOL/L (ref 3–14)
ANION GAP SERPL CALCULATED.3IONS-SCNC: 7 MMOL/L (ref 3–14)
ANION GAP SERPL CALCULATED.3IONS-SCNC: 7 MMOL/L (ref 3–14)
ANION GAP SERPL CALCULATED.3IONS-SCNC: 8 MMOL/L (ref 3–14)
ANION GAP SERPL CALCULATED.3IONS-SCNC: 9 MMOL/L (ref 3–14)
ANION GAP SERPL CALCULATED.3IONS-SCNC: 9 MMOL/L (ref 3–14)
APPEARANCE FLD: NORMAL
APPEARANCE UR: ABNORMAL
APPEARANCE UR: ABNORMAL
APPEARANCE UR: CLEAR
AST SERPL W P-5'-P-CCNC: 106 U/L (ref 0–45)
AST SERPL W P-5'-P-CCNC: 31 U/L (ref 0–45)
AST SERPL W P-5'-P-CCNC: 49 U/L (ref 0–45)
AST SERPL W P-5'-P-CCNC: 62 U/L (ref 0–45)
AST SERPL W P-5'-P-CCNC: 69 U/L (ref 0–45)
AST SERPL W P-5'-P-CCNC: 78 U/L (ref 0–45)
AST SERPL W P-5'-P-CCNC: 85 U/L (ref 0–45)
AST SERPL-CCNC: 62 U/L (ref 10–35)
BACTERIA SPEC CULT: NO GROWTH
BACTERIA SPEC CULT: NO GROWTH
BACTERIA, UR: ABNORMAL
BASE DEFICIT BLDA-SCNC: 21.3 MMOL/L
BASE DEFICIT BLDA-SCNC: 21.8 MMOL/L
BASE DEFICIT BLDA-SCNC: 22.7 MMOL/L
BASE DEFICIT BLDV-SCNC: 15.9 MMOL/L
BASE DEFICIT BLDV-SCNC: 16.4 MMOL/L
BASE DEFICIT BLDV-SCNC: 20.4 MMOL/L
BASOPHILS # BLD AUTO: 0 10E9/L (ref 0–0.2)
BASOPHILS # BLD AUTO: 0.1 10E9/L (ref 0–0.2)
BASOPHILS # BLD AUTO: 0.1 10E9/L (ref 0–0.2)
BASOPHILS NFR BLD AUTO: 0.1 %
BASOPHILS NFR BLD AUTO: 0.4 %
BASOPHILS NFR BLD AUTO: 0.4 %
BASOPHILS NFR BLD AUTO: 0.6 %
BASOPHILS NFR BLD AUTO: 0.8 %
BASOPHILS NFR BLD AUTO: 2 %
BILIRUB DIRECT SERPL-MCNC: 1.2 MG/DL (ref 0–0.2)
BILIRUB DIRECT SERPL-MCNC: 2.1 MG/DL (ref 0–0.2)
BILIRUB DIRECT SERPL-MCNC: 2.8 MG/DL (ref 0–0.2)
BILIRUB DIRECT SERPL-MCNC: 3.2 MG/DL (ref 0–0.2)
BILIRUB SERPL-MCNC: 2.3 MG/DL (ref 0.2–1.3)
BILIRUB SERPL-MCNC: 2.6 MG/DL (ref 0.2–1.3)
BILIRUB SERPL-MCNC: 4.5 MG/DL (ref 0.2–1.2)
BILIRUB SERPL-MCNC: 4.7 MG/DL (ref 0.2–1.3)
BILIRUB SERPL-MCNC: 5.5 MG/DL (ref 0.2–1.3)
BILIRUB SERPL-MCNC: 5.8 MG/DL (ref 0.2–1.3)
BILIRUB SERPL-MCNC: 5.9 MG/DL (ref 0.2–1.3)
BILIRUB SERPL-MCNC: 8.2 MG/DL (ref 0.2–1.3)
BILIRUB UR QL STRIP: NEGATIVE
BILIRUB UR QL STRIP: NEGATIVE
BILIRUB UR QL: ABNORMAL
BUN SERPL-MCNC: 15 MG/DL (ref 7–30)
BUN SERPL-MCNC: 16 MG/DL (ref 7–30)
BUN SERPL-MCNC: 17 MG/DL (ref 7–30)
BUN SERPL-MCNC: 18 MG/DL (ref 7–30)
BUN SERPL-MCNC: 20 MG/DL (ref 7–25)
BUN SERPL-MCNC: 20 MG/DL (ref 7–30)
BUN SERPL-MCNC: 26 MG/DL (ref 7–30)
BUN SERPL-MCNC: 27 MG/DL (ref 7–30)
BUN SERPL-MCNC: 28 MG/DL (ref 7–30)
BUN SERPL-MCNC: 30 MG/DL (ref 7–30)
BUN SERPL-MCNC: 30 MG/DL (ref 7–30)
BUN SERPL-MCNC: 31 MG/DL (ref 7–30)
BUN SERPL-MCNC: 31 MG/DL (ref 7–30)
BUN SERPL-MCNC: 32 MG/DL (ref 7–30)
BUN SERPL-MCNC: 35 MG/DL (ref 7–30)
BUN/CREATININE RATIO: 16 (CALC) (ref 6–22)
BURR CELLS BLD QL SMEAR: ABNORMAL
BURR CELLS BLD QL SMEAR: SLIGHT
C DIFF TOX B STL QL: NEGATIVE
CALCIUM SERPL-MCNC: 7.8 MG/DL (ref 8.5–10.1)
CALCIUM SERPL-MCNC: 7.8 MG/DL (ref 8.5–10.1)
CALCIUM SERPL-MCNC: 7.9 MG/DL (ref 8.5–10.1)
CALCIUM SERPL-MCNC: 8 MG/DL (ref 8.5–10.1)
CALCIUM SERPL-MCNC: 8.1 MG/DL (ref 8.5–10.1)
CALCIUM SERPL-MCNC: 8.2 MG/DL (ref 8.5–10.1)
CALCIUM SERPL-MCNC: 8.2 MG/DL (ref 8.5–10.1)
CALCIUM SERPL-MCNC: 8.4 MG/DL (ref 8.5–10.1)
CALCIUM SERPL-MCNC: 8.5 MG/DL (ref 8.5–10.1)
CALCIUM SERPL-MCNC: 8.7 MG/DL (ref 8.5–10.1)
CALCIUM SERPL-MCNC: 8.8 MG/DL (ref 8.5–10.1)
CALCIUM SERPL-MCNC: 8.8 MG/DL (ref 8.5–10.1)
CALCIUM SERPL-MCNC: 9 MG/DL (ref 8.6–10.4)
CASTS/LPF: ABNORMAL
CHLORIDE SERPL-SCNC: 103 MMOL/L (ref 94–109)
CHLORIDE SERPL-SCNC: 105 MMOL/L (ref 94–109)
CHLORIDE SERPL-SCNC: 107 MMOL/L (ref 94–109)
CHLORIDE SERPL-SCNC: 108 MMOL/L (ref 94–109)
CHLORIDE SERPL-SCNC: 108 MMOL/L (ref 94–109)
CHLORIDE SERPL-SCNC: 109 MMOL/L (ref 94–109)
CHLORIDE SERPL-SCNC: 110 MMOL/L (ref 94–109)
CHLORIDE SERPL-SCNC: 110 MMOL/L (ref 94–109)
CHLORIDE SERPL-SCNC: 111 MMOL/L (ref 94–109)
CHLORIDE SERPL-SCNC: 112 MMOL/L (ref 94–109)
CHLORIDE SERPL-SCNC: 114 MMOL/L (ref 94–109)
CHLORIDE SERPL-SCNC: 115 MMOL/L (ref 94–109)
CHLORIDE SERPLBLD-SCNC: 102 MMOL/L (ref 98–110)
CK SERPL-CCNC: 140 U/L (ref 30–225)
CO2 SERPL-SCNC: 22 MMOL/L (ref 20–32)
CO2 SERPL-SCNC: 23 MMOL/L (ref 20–32)
CO2 SERPL-SCNC: 24 MMOL/L (ref 20–32)
CO2 SERPL-SCNC: 24 MMOL/L (ref 20–32)
CO2 SERPL-SCNC: 25 MMOL/L (ref 20–32)
CO2 SERPL-SCNC: 27 MMOL/L (ref 20–32)
CO2 SERPL-SCNC: 28 MMOL/L (ref 20–32)
CO2 SERPL-SCNC: 7 MMOL/L (ref 20–32)
CO2 SERPL-SCNC: 8 MMOL/L (ref 20–32)
COLOR FLD: YELLOW
COLOR UR AUTO: ABNORMAL
COLOR UR AUTO: YELLOW
COLOR UR: ABNORMAL
COPATH REPORT: NORMAL
CREAT BLD-MCNC: 1.1 MG/DL (ref 0.52–1.04)
CREAT SERPL-MCNC: 0.76 MG/DL (ref 0.52–1.04)
CREAT SERPL-MCNC: 0.85 MG/DL (ref 0.52–1.04)
CREAT SERPL-MCNC: 0.92 MG/DL (ref 0.52–1.04)
CREAT SERPL-MCNC: 0.93 MG/DL (ref 0.52–1.04)
CREAT SERPL-MCNC: 1 MG/DL (ref 0.52–1.04)
CREAT SERPL-MCNC: 1.02 MG/DL (ref 0.52–1.04)
CREAT SERPL-MCNC: 1.09 MG/DL (ref 0.52–1.04)
CREAT SERPL-MCNC: 1.1 MG/DL (ref 0.52–1.04)
CREAT SERPL-MCNC: 1.1 MG/DL (ref 0.52–1.04)
CREAT SERPL-MCNC: 1.14 MG/DL (ref 0.52–1.04)
CREAT SERPL-MCNC: 1.14 MG/DL (ref 0.52–1.04)
CREAT SERPL-MCNC: 1.25 MG/DL (ref 0.52–1.04)
CREAT SERPL-MCNC: 1.25 MG/DL (ref 0.6–0.88)
CREAT SERPL-MCNC: 1.37 MG/DL (ref 0.52–1.04)
CREAT SERPL-MCNC: 1.49 MG/DL (ref 0.52–1.04)
CRP SERPL-MCNC: 90.1 MG/L (ref 0–8)
D DIMER PPP FEU-MCNC: 7.9 UG/ML FEU (ref 0–0.5)
DIFFERENTIAL METHOD BLD: ABNORMAL
EGFR AFRICAN AMERICAN - QUEST: 46 ML/MIN/1.73M2
EOSINOPHIL # BLD AUTO: 0 10E9/L (ref 0–0.7)
EOSINOPHIL # BLD AUTO: 0.3 10E9/L (ref 0–0.7)
EOSINOPHIL # BLD AUTO: 0.3 10E9/L (ref 0–0.7)
EOSINOPHIL # BLD AUTO: 0.4 10E9/L (ref 0–0.7)
EOSINOPHIL # BLD AUTO: 0.6 10E9/L (ref 0–0.7)
EOSINOPHIL NFR BLD AUTO: 0 %
EOSINOPHIL NFR BLD AUTO: 0.1 %
EOSINOPHIL NFR BLD AUTO: 0.1 %
EOSINOPHIL NFR BLD AUTO: 3.3 %
EOSINOPHIL NFR BLD AUTO: 4.4 %
EOSINOPHIL NFR BLD AUTO: 5.4 %
EOSINOPHIL NFR BLD AUTO: 8.7 %
EP/HPF: ABNORMAL
ERYTHROCYTE [DISTWIDTH] IN BLOOD BY AUTOMATED COUNT: 23.7 % (ref 10–15)
ERYTHROCYTE [DISTWIDTH] IN BLOOD BY AUTOMATED COUNT: 24.3 % (ref 10–15)
ERYTHROCYTE [DISTWIDTH] IN BLOOD BY AUTOMATED COUNT: 24.6 % (ref 10–15)
ERYTHROCYTE [DISTWIDTH] IN BLOOD BY AUTOMATED COUNT: 24.6 % (ref 10–15)
ERYTHROCYTE [DISTWIDTH] IN BLOOD BY AUTOMATED COUNT: 24.8 % (ref 10–15)
ERYTHROCYTE [DISTWIDTH] IN BLOOD BY AUTOMATED COUNT: 24.9 % (ref 10–15)
ERYTHROCYTE [DISTWIDTH] IN BLOOD BY AUTOMATED COUNT: 25 % (ref 10–15)
ERYTHROCYTE [DISTWIDTH] IN BLOOD BY AUTOMATED COUNT: 25.2 % (ref 10–15)
ERYTHROCYTE [DISTWIDTH] IN BLOOD BY AUTOMATED COUNT: 26 % (ref 10–15)
ERYTHROCYTE [DISTWIDTH] IN BLOOD BY AUTOMATED COUNT: 26.1 % (ref 10–15)
ERYTHROCYTE [DISTWIDTH] IN BLOOD BY AUTOMATED COUNT: 26.4 % (ref 10–15)
ERYTHROCYTE [SEDIMENTATION RATE] IN BLOOD BY WESTERGREN METHOD: 7 MM/H (ref 0–30)
FIBRINOGEN PPP-MCNC: 115 MG/DL (ref 200–420)
FLUAV+FLUBV AG SPEC QL: NEGATIVE
FLUAV+FLUBV AG SPEC QL: NEGATIVE
GFR SERPL CREATININE-BSD FRML MDRD: 32 ML/MIN/{1.73_M2}
GFR SERPL CREATININE-BSD FRML MDRD: 35 ML/MIN/{1.73_M2}
GFR SERPL CREATININE-BSD FRML MDRD: 39 ML/MIN/1.73M2
GFR SERPL CREATININE-BSD FRML MDRD: 39 ML/MIN/{1.73_M2}
GFR SERPL CREATININE-BSD FRML MDRD: 44 ML/MIN/{1.73_M2}
GFR SERPL CREATININE-BSD FRML MDRD: 44 ML/MIN/{1.73_M2}
GFR SERPL CREATININE-BSD FRML MDRD: 46 ML/MIN/{1.73_M2}
GFR SERPL CREATININE-BSD FRML MDRD: 47 ML/MIN/{1.73_M2}
GFR SERPL CREATININE-BSD FRML MDRD: 50 ML/MIN/{1.73_M2}
GFR SERPL CREATININE-BSD FRML MDRD: 52 ML/MIN/{1.73_M2}
GFR SERPL CREATININE-BSD FRML MDRD: 56 ML/MIN/{1.73_M2}
GFR SERPL CREATININE-BSD FRML MDRD: 57 ML/MIN/{1.73_M2}
GFR SERPL CREATININE-BSD FRML MDRD: 63 ML/MIN/{1.73_M2}
GFR SERPL CREATININE-BSD FRML MDRD: 72 ML/MIN/{1.73_M2}
GLOBULIN, CALCULATED - QUEST: 1.9 G/DL (CALC) (ref 1.9–3.7)
GLUCOSE - QUEST: 114 MG/DL (ref 65–99)
GLUCOSE BLDC GLUCOMTR-MCNC: 100 MG/DL (ref 70–99)
GLUCOSE BLDC GLUCOMTR-MCNC: 100 MG/DL (ref 70–99)
GLUCOSE BLDC GLUCOMTR-MCNC: 101 MG/DL (ref 70–99)
GLUCOSE BLDC GLUCOMTR-MCNC: 101 MG/DL (ref 70–99)
GLUCOSE BLDC GLUCOMTR-MCNC: 102 MG/DL (ref 70–99)
GLUCOSE BLDC GLUCOMTR-MCNC: 106 MG/DL (ref 70–99)
GLUCOSE BLDC GLUCOMTR-MCNC: 106 MG/DL (ref 70–99)
GLUCOSE BLDC GLUCOMTR-MCNC: 107 MG/DL (ref 70–99)
GLUCOSE BLDC GLUCOMTR-MCNC: 109 MG/DL (ref 70–99)
GLUCOSE BLDC GLUCOMTR-MCNC: 110 MG/DL (ref 70–99)
GLUCOSE BLDC GLUCOMTR-MCNC: 114 MG/DL (ref 70–99)
GLUCOSE BLDC GLUCOMTR-MCNC: 120 MG/DL (ref 70–99)
GLUCOSE BLDC GLUCOMTR-MCNC: 128 MG/DL (ref 70–99)
GLUCOSE BLDC GLUCOMTR-MCNC: 128 MG/DL (ref 70–99)
GLUCOSE BLDC GLUCOMTR-MCNC: 129 MG/DL (ref 70–99)
GLUCOSE BLDC GLUCOMTR-MCNC: 129 MG/DL (ref 70–99)
GLUCOSE BLDC GLUCOMTR-MCNC: 130 MG/DL (ref 70–99)
GLUCOSE BLDC GLUCOMTR-MCNC: 130 MG/DL (ref 70–99)
GLUCOSE BLDC GLUCOMTR-MCNC: 132 MG/DL (ref 70–99)
GLUCOSE BLDC GLUCOMTR-MCNC: 133 MG/DL (ref 70–99)
GLUCOSE BLDC GLUCOMTR-MCNC: 133 MG/DL (ref 70–99)
GLUCOSE BLDC GLUCOMTR-MCNC: 134 MG/DL (ref 70–99)
GLUCOSE BLDC GLUCOMTR-MCNC: 135 MG/DL (ref 70–99)
GLUCOSE BLDC GLUCOMTR-MCNC: 135 MG/DL (ref 70–99)
GLUCOSE BLDC GLUCOMTR-MCNC: 137 MG/DL (ref 70–99)
GLUCOSE BLDC GLUCOMTR-MCNC: 137 MG/DL (ref 70–99)
GLUCOSE BLDC GLUCOMTR-MCNC: 139 MG/DL (ref 70–99)
GLUCOSE BLDC GLUCOMTR-MCNC: 144 MG/DL (ref 70–99)
GLUCOSE BLDC GLUCOMTR-MCNC: 145 MG/DL (ref 70–99)
GLUCOSE BLDC GLUCOMTR-MCNC: 145 MG/DL (ref 70–99)
GLUCOSE BLDC GLUCOMTR-MCNC: 156 MG/DL (ref 70–99)
GLUCOSE BLDC GLUCOMTR-MCNC: 158 MG/DL (ref 70–99)
GLUCOSE BLDC GLUCOMTR-MCNC: 171 MG/DL (ref 70–99)
GLUCOSE BLDC GLUCOMTR-MCNC: 174 MG/DL (ref 70–99)
GLUCOSE BLDC GLUCOMTR-MCNC: 178 MG/DL (ref 70–99)
GLUCOSE BLDC GLUCOMTR-MCNC: 93 MG/DL (ref 70–99)
GLUCOSE BLDC GLUCOMTR-MCNC: 99 MG/DL (ref 70–99)
GLUCOSE FLD-MCNC: 49 MG/DL
GLUCOSE SERPL-MCNC: 100 MG/DL (ref 70–99)
GLUCOSE SERPL-MCNC: 102 MG/DL (ref 70–99)
GLUCOSE SERPL-MCNC: 102 MG/DL (ref 70–99)
GLUCOSE SERPL-MCNC: 103 MG/DL (ref 70–99)
GLUCOSE SERPL-MCNC: 114 MG/DL (ref 70–99)
GLUCOSE SERPL-MCNC: 117 MG/DL (ref 70–99)
GLUCOSE SERPL-MCNC: 119 MG/DL (ref 70–99)
GLUCOSE SERPL-MCNC: 124 MG/DL (ref 70–99)
GLUCOSE SERPL-MCNC: 125 MG/DL (ref 70–99)
GLUCOSE SERPL-MCNC: 133 MG/DL (ref 70–99)
GLUCOSE SERPL-MCNC: 135 MG/DL (ref 70–99)
GLUCOSE SERPL-MCNC: 65 MG/DL (ref 70–99)
GLUCOSE SERPL-MCNC: 92 MG/DL (ref 70–99)
GLUCOSE SERPL-MCNC: 95 MG/DL (ref 70–99)
GLUCOSE UR STRIP-MCNC: NEGATIVE MG/DL
GLUCOSE UR STRIP-MCNC: NEGATIVE MG/DL
GLUCOSE URINE: ABNORMAL MG/DL
GRAM STN SPEC: ABNORMAL
HBA1C MFR BLD: 5.5 % (ref 4–7)
HBA1C MFR BLD: 5.7 % (ref 0–5.6)
HCO3 BLD-SCNC: 5 MMOL/L (ref 21–28)
HCO3 BLD-SCNC: 7 MMOL/L (ref 21–28)
HCO3 BLD-SCNC: 8 MMOL/L (ref 21–28)
HCO3 BLDV-SCNC: 10 MMOL/L (ref 21–28)
HCO3 BLDV-SCNC: 8 MMOL/L (ref 21–28)
HCO3 BLDV-SCNC: 8 MMOL/L (ref 21–28)
HCT VFR BLD AUTO: 31.1 % (ref 35–47)
HCT VFR BLD AUTO: 32 % (ref 35–47)
HCT VFR BLD AUTO: 32.2 % (ref 35–47)
HCT VFR BLD AUTO: 32.9 % (ref 35–47)
HCT VFR BLD AUTO: 33 % (ref 35–47)
HCT VFR BLD AUTO: 34 % (ref 35–47)
HCT VFR BLD AUTO: 34.4 % (ref 35–47)
HCT VFR BLD AUTO: 35.7 % (ref 35–47)
HCT VFR BLD AUTO: 36.4 % (ref 35–47)
HCT VFR BLD AUTO: 36.8 % (ref 35–47)
HCT VFR BLD AUTO: 38.4 % (ref 35–47)
HCT VFR BLD AUTO: 39 % (ref 35–47)
HEMOGLOBIN: 11.8 G/DL (ref 11.7–15.7)
HGB BLD-MCNC: 10 G/DL (ref 11.7–15.7)
HGB BLD-MCNC: 10.3 G/DL (ref 11.7–15.7)
HGB BLD-MCNC: 10.3 G/DL (ref 11.7–15.7)
HGB BLD-MCNC: 10.5 G/DL (ref 11.7–15.7)
HGB BLD-MCNC: 10.9 G/DL (ref 11.7–15.7)
HGB BLD-MCNC: 11 G/DL (ref 11.7–15.7)
HGB BLD-MCNC: 11.1 G/DL (ref 11.7–15.7)
HGB BLD-MCNC: 11.8 G/DL (ref 11.7–15.7)
HGB BLD-MCNC: 11.9 G/DL (ref 11.7–15.7)
HGB BLD-MCNC: 12.3 G/DL (ref 11.7–15.7)
HGB BLD-MCNC: 9.8 G/DL (ref 11.7–15.7)
HGB UR QL STRIP: NEGATIVE
HGB UR QL STRIP: NEGATIVE
HGB UR QL: ABNORMAL
HYALINE CASTS #/AREA URNS LPF: 54 /LPF (ref 0–2)
IMM GRANULOCYTES # BLD: 0.1 10E9/L (ref 0–0.4)
IMM GRANULOCYTES # BLD: 0.2 10E9/L (ref 0–0.4)
IMM GRANULOCYTES # BLD: 0.3 10E9/L (ref 0–0.4)
IMM GRANULOCYTES NFR BLD: 0.8 %
IMM GRANULOCYTES NFR BLD: 1 %
IMM GRANULOCYTES NFR BLD: 1.1 %
IMM GRANULOCYTES NFR BLD: 1.2 %
IMM GRANULOCYTES NFR BLD: 1.5 %
INR PPP: 1.42 (ref 0.86–1.14)
INR PPP: 3.31 (ref 0.86–1.14)
INR PPP: 4.45 (ref 0.86–1.14)
INTERPRETATION ECG - MUSE: NORMAL
KETONES UR QL: ABNORMAL MG/DL
KETONES UR STRIP-MCNC: 5 MG/DL
KETONES UR STRIP-MCNC: NEGATIVE MG/DL
LACTATE BLD-SCNC: 1.8 MMOL/L (ref 0.7–2)
LACTATE BLD-SCNC: 16 MMOL/L (ref 0.7–2)
LACTATE BLD-SCNC: 16 MMOL/L (ref 0.7–2)
LACTATE BLD-SCNC: 17 MMOL/L (ref 0.7–2)
LACTATE BLD-SCNC: 17 MMOL/L (ref 0.7–2)
LACTATE BLD-SCNC: 18 MMOL/L (ref 0.7–2)
LACTATE BLD-SCNC: 2.6 MMOL/L (ref 0.7–2)
LACTATE BLD-SCNC: 3 MMOL/L (ref 0.7–2)
LACTATE BLD-SCNC: 4.6 MMOL/L (ref 0.7–2)
LACTATE BLD-SCNC: 5 MMOL/L (ref 0.7–2)
LDH SERPL L TO P-CCNC: 456 U/L (ref 81–234)
LEUKOCYTE ESTERASE - QUEST: ABNORMAL
LEUKOCYTE ESTERASE UR QL STRIP: NEGATIVE
LEUKOCYTE ESTERASE UR QL STRIP: NEGATIVE
LYMPHOCYTES # BLD AUTO: 0.3 10E9/L (ref 0.8–5.3)
LYMPHOCYTES # BLD AUTO: 0.4 10E9/L (ref 0.8–5.3)
LYMPHOCYTES # BLD AUTO: 1 10E9/L (ref 0.8–5.3)
LYMPHOCYTES # BLD AUTO: 1.2 10E9/L (ref 0.8–5.3)
LYMPHOCYTES # BLD AUTO: 1.3 10E9/L (ref 0.8–5.3)
LYMPHOCYTES # BLD AUTO: 1.8 10E9/L (ref 0.8–5.3)
LYMPHOCYTES NFR BLD AUTO: 13.7 %
LYMPHOCYTES NFR BLD AUTO: 15.1 %
LYMPHOCYTES NFR BLD AUTO: 16 %
LYMPHOCYTES NFR BLD AUTO: 16.8 %
LYMPHOCYTES NFR BLD AUTO: 18.2 %
LYMPHOCYTES NFR BLD AUTO: 19.4 %
LYMPHOCYTES NFR BLD AUTO: 5.8 %
LYMPHOCYTES NFR BLD AUTO: 6 %
LYMPHOCYTES NFR BLD AUTO: 6.2 %
LYMPHOCYTES NFR BLD AUTO: 9.7 %
LYMPHOCYTES NFR FLD MANUAL: 1 %
Lab: NORMAL
Lab: NORMAL
MAGNESIUM SERPL-MCNC: 1.3 MG/DL (ref 1.6–2.3)
MAGNESIUM SERPL-MCNC: 2 MG/DL (ref 1.6–2.3)
MAGNESIUM SERPL-MCNC: 2.1 MG/DL (ref 1.6–2.3)
MCH RBC QN AUTO: 26.4 PG (ref 26–33)
MCH RBC QN AUTO: 26.7 PG (ref 26.5–33)
MCH RBC QN AUTO: 28.1 PG (ref 26.5–33)
MCH RBC QN AUTO: 28.1 PG (ref 26.5–33)
MCH RBC QN AUTO: 28.2 PG (ref 26.5–33)
MCH RBC QN AUTO: 28.3 PG (ref 26.5–33)
MCH RBC QN AUTO: 28.4 PG (ref 26.5–33)
MCH RBC QN AUTO: 28.5 PG (ref 26.5–33)
MCH RBC QN AUTO: 28.6 PG (ref 26.5–33)
MCH RBC QN AUTO: 28.9 PG (ref 26.5–33)
MCH RBC QN AUTO: 28.9 PG (ref 26.5–33)
MCH RBC QN AUTO: 29.2 PG (ref 26.5–33)
MCHC RBC AUTO-ENTMCNC: 30.7 G/DL (ref 31–36)
MCHC RBC AUTO-ENTMCNC: 31.1 G/DL (ref 31.5–36.5)
MCHC RBC AUTO-ENTMCNC: 31.1 G/DL (ref 31.5–36.5)
MCHC RBC AUTO-ENTMCNC: 31.3 G/DL (ref 31.5–36.5)
MCHC RBC AUTO-ENTMCNC: 31.5 G/DL (ref 31.5–36.5)
MCHC RBC AUTO-ENTMCNC: 31.5 G/DL (ref 31.5–36.5)
MCHC RBC AUTO-ENTMCNC: 31.8 G/DL (ref 31.5–36.5)
MCHC RBC AUTO-ENTMCNC: 32 G/DL (ref 31.5–36.5)
MCHC RBC AUTO-ENTMCNC: 32.1 G/DL (ref 31.5–36.5)
MCHC RBC AUTO-ENTMCNC: 32.1 G/DL (ref 31.5–36.5)
MCHC RBC AUTO-ENTMCNC: 32.2 G/DL (ref 31.5–36.5)
MCHC RBC AUTO-ENTMCNC: 32.7 G/DL (ref 31.5–36.5)
MCV RBC AUTO: 85 FL (ref 78–100)
MCV RBC AUTO: 85.9 FL (ref 78–100)
MCV RBC AUTO: 87 FL (ref 78–100)
MCV RBC AUTO: 88 FL (ref 78–100)
MCV RBC AUTO: 89 FL (ref 78–100)
MCV RBC AUTO: 89 FL (ref 78–100)
MCV RBC AUTO: 90 FL (ref 78–100)
MCV RBC AUTO: 91 FL (ref 78–100)
MCV RBC AUTO: 93 FL (ref 78–100)
MCV RBC AUTO: 94 FL (ref 78–100)
METAMYELOCYTES # BLD: 0.4 10E9/L
METAMYELOCYTES # BLD: 0.5 10E9/L
METAMYELOCYTES NFR BLD MANUAL: 16 %
METAMYELOCYTES NFR BLD MANUAL: 9 %
MISC.: ABNORMAL
MONOCYTES # BLD AUTO: 0.1 10E9/L (ref 0–1.3)
MONOCYTES # BLD AUTO: 0.6 10E9/L (ref 0–1.3)
MONOCYTES # BLD AUTO: 0.8 10E9/L (ref 0–1.3)
MONOCYTES # BLD AUTO: 0.9 10E9/L (ref 0–1.3)
MONOCYTES # BLD AUTO: 0.9 10E9/L (ref 0–1.3)
MONOCYTES # BLD AUTO: 1 10E9/L (ref 0–1.3)
MONOCYTES # BLD AUTO: 1.6 10E9/L (ref 0–1.3)
MONOCYTES # BLD AUTO: 1.6 10E9/L (ref 0–1.3)
MONOCYTES NFR BLD AUTO: 12.3 %
MONOCYTES NFR BLD AUTO: 3 %
MONOCYTES NFR BLD AUTO: 6.4 %
MONOCYTES NFR BLD AUTO: 7.2 %
MONOCYTES NFR BLD AUTO: 7.3 %
MONOCYTES NFR BLD AUTO: 7.9 %
MONOCYTES NFR BLD AUTO: 9.1 %
MONOCYTES NFR BLD AUTO: 9.6 %
MONOCYTES NFR BLD AUTO: 9.8 %
MONOS+MACROS NFR FLD MANUAL: 1 %
MUCOUS THREADS #/AREA URNS LPF: PRESENT /LPF
MUCOUS THREADS #/AREA URNS LPF: PRESENT /LPF
MYELOCYTES # BLD: 0.1 10E9/L
MYELOCYTES # BLD: 0.1 10E9/L
MYELOCYTES NFR BLD MANUAL: 1 %
MYELOCYTES NFR BLD MANUAL: 3 %
NEUTROPHILS # BLD AUTO: 1.7 10E9/L (ref 1.6–8.3)
NEUTROPHILS # BLD AUTO: 11.4 10E9/L (ref 1.6–8.3)
NEUTROPHILS # BLD AUTO: 18.4 10E9/L (ref 1.6–8.3)
NEUTROPHILS # BLD AUTO: 19 10E9/L (ref 1.6–8.3)
NEUTROPHILS # BLD AUTO: 3.9 10E9/L (ref 1.6–8.3)
NEUTROPHILS # BLD AUTO: 4.2 10E9/L (ref 1.6–8.3)
NEUTROPHILS # BLD AUTO: 4.7 10E9/L (ref 1.6–8.3)
NEUTROPHILS # BLD AUTO: 6.9 10E9/L (ref 1.6–8.3)
NEUTROPHILS # BLD AUTO: 7.2 10E9/L (ref 1.6–8.3)
NEUTROPHILS NFR BLD AUTO: 60 %
NEUTROPHILS NFR BLD AUTO: 61 %
NEUTROPHILS NFR BLD AUTO: 64.5 %
NEUTROPHILS NFR BLD AUTO: 68.5 %
NEUTROPHILS NFR BLD AUTO: 71.3 %
NEUTROPHILS NFR BLD AUTO: 82.9 %
NEUTROPHILS NFR BLD AUTO: 84 %
NEUTROPHILS NFR BLD AUTO: 85.4 %
NEUTROPHILS NFR BLD AUTO: 85.7 %
NEUTS BAND NFR FLD MANUAL: 98 %
NITRATE UR QL: NEGATIVE
NITRATE UR QL: NEGATIVE
NITRITE UR QL STRIP: ABNORMAL
NRBC # BLD AUTO: 0 10*3/UL
NRBC # BLD AUTO: 0.1 10*3/UL
NRBC # BLD AUTO: 0.1 10*3/UL
NRBC BLD AUTO-RTO: 0 /100
NRBC BLD AUTO-RTO: 1 /100
NRBC BLD AUTO-RTO: 3 /100
NT-PROBNP SERPL-MCNC: 3758 PG/ML (ref 0–1800)
O2/TOTAL GAS SETTING VFR VENT: 50 %
O2/TOTAL GAS SETTING VFR VENT: ABNORMAL %
OVALOCYTES BLD QL SMEAR: SLIGHT
OXYHGB MFR BLD: 96 % (ref 92–100)
OXYHGB MFR BLD: 98 % (ref 92–100)
OXYHGB MFR BLD: 98 % (ref 92–100)
OXYHGB MFR BLDV: 65 %
PCO2 BLD: 15 MM HG (ref 35–45)
PCO2 BLD: 27 MM HG (ref 35–45)
PCO2 BLD: 28 MM HG (ref 35–45)
PCO2 BLDV: 17 MM HG (ref 40–50)
PCO2 BLDV: 24 MM HG (ref 40–50)
PCO2 BLDV: 27 MM HG (ref 40–50)
PH BLD: 7.01 PH (ref 7.35–7.45)
PH BLD: 7.06 PH (ref 7.35–7.45)
PH BLD: 7.14 PH (ref 7.35–7.45)
PH BLDV: 7.11 PH (ref 7.32–7.43)
PH BLDV: 7.19 PH (ref 7.32–7.43)
PH BLDV: 7.3 PH (ref 7.32–7.43)
PH UR STRIP: 5 PH (ref 5–7)
PH UR STRIP: 5 PH (ref 5–7)
PH UR STRIP: 5.5 PH (ref 5–7)
PHOSPHATE SERPL-MCNC: 4.4 MG/DL (ref 2.5–4.5)
PLATELET # BLD AUTO: 104 10E9/L (ref 150–450)
PLATELET # BLD AUTO: 107 10E9/L (ref 150–450)
PLATELET # BLD AUTO: 115 10E9/L (ref 150–450)
PLATELET # BLD AUTO: 125 10E9/L (ref 150–450)
PLATELET # BLD AUTO: 139 10E9/L (ref 150–450)
PLATELET # BLD AUTO: 162 10E9/L (ref 150–450)
PLATELET # BLD AUTO: 168 10E9/L (ref 150–450)
PLATELET # BLD AUTO: 181 10E9/L (ref 150–450)
PLATELET # BLD AUTO: 87 10E9/L (ref 150–450)
PLATELET # BLD AUTO: 96 10E9/L (ref 150–450)
PLATELET # BLD AUTO: 96 10E9/L (ref 150–450)
PLATELET # BLD EST: ABNORMAL 10*3/UL
PLATELET # BLD EST: ABNORMAL 10*3/UL
PLATELET COUNT - QUEST: 165 10^9/L (ref 150–375)
PO2 BLD: 117 MM HG (ref 80–105)
PO2 BLD: 144 MM HG (ref 80–105)
PO2 BLD: 279 MM HG (ref 80–105)
PO2 BLDV: 39 MM HG (ref 25–47)
PO2 BLDV: 49 MM HG (ref 25–47)
PO2 BLDV: 89 MM HG (ref 25–47)
POLYCHROMASIA BLD QL SMEAR: SLIGHT
POTASSIUM SERPL-SCNC: 2.9 MMOL/L (ref 3.5–5.3)
POTASSIUM SERPL-SCNC: 3.3 MMOL/L (ref 3.4–5.3)
POTASSIUM SERPL-SCNC: 3.3 MMOL/L (ref 3.4–5.3)
POTASSIUM SERPL-SCNC: 3.5 MMOL/L (ref 3.4–5.3)
POTASSIUM SERPL-SCNC: 3.6 MMOL/L (ref 3.4–5.3)
POTASSIUM SERPL-SCNC: 3.6 MMOL/L (ref 3.4–5.3)
POTASSIUM SERPL-SCNC: 3.7 MMOL/L (ref 3.4–5.3)
POTASSIUM SERPL-SCNC: 3.8 MMOL/L (ref 3.4–5.3)
POTASSIUM SERPL-SCNC: 3.9 MMOL/L (ref 3.4–5.3)
POTASSIUM SERPL-SCNC: 4 MMOL/L (ref 3.4–5.3)
POTASSIUM SERPL-SCNC: 4.1 MMOL/L (ref 3.4–5.3)
POTASSIUM SERPL-SCNC: 4.3 MMOL/L (ref 3.4–5.3)
POTASSIUM SERPL-SCNC: 4.3 MMOL/L (ref 3.4–5.3)
POTASSIUM SERPL-SCNC: 4.4 MMOL/L (ref 3.4–5.3)
PROCALCITONIN SERPL-MCNC: 14.09 NG/ML
PROT FLD-MCNC: 1.7 G/DL
PROT SERPL-MCNC: 4.2 G/DL (ref 6.8–8.8)
PROT SERPL-MCNC: 4.3 G/DL (ref 6.8–8.8)
PROT SERPL-MCNC: 4.5 G/DL (ref 6.8–8.8)
PROT SERPL-MCNC: 4.6 G/DL (ref 6.8–8.8)
PROT SERPL-MCNC: 4.7 G/DL (ref 6.8–8.8)
PROT SERPL-MCNC: 4.8 G/DL (ref 6.1–8.1)
PROT SERPL-MCNC: 5.2 G/DL (ref 6.8–8.8)
PROT SERPL-MCNC: 5.2 G/DL (ref 6.8–8.8)
RBC # BLD AUTO: 3.43 10E12/L (ref 3.8–5.2)
RBC # BLD AUTO: 3.48 10E12/L (ref 3.8–5.2)
RBC # BLD AUTO: 3.62 10E12/L (ref 3.8–5.2)
RBC # BLD AUTO: 3.63 10E12/L (ref 3.8–5.2)
RBC # BLD AUTO: 3.67 10E12/L (ref 3.8–5.2)
RBC # BLD AUTO: 3.81 10E12/L (ref 3.8–5.2)
RBC # BLD AUTO: 3.84 10E12/L (ref 3.8–5.2)
RBC # BLD AUTO: 3.89 10E12/L (ref 3.8–5.2)
RBC # BLD AUTO: 4.19 10E12/L (ref 3.8–5.2)
RBC # BLD AUTO: 4.2 10E12/L (ref 3.8–5.2)
RBC # BLD AUTO: 4.47 10*12/L (ref 3.8–5.2)
RBC # BLD AUTO: 4.61 10E12/L (ref 3.8–5.2)
RBC #/AREA URNS AUTO: 1 /HPF (ref 0–2)
RBC #/AREA URNS AUTO: 1 /HPF (ref 0–2)
RBC, UR MICRO: ABNORMAL (ref ?–2)
RETICS # AUTO: 125.6 10E9/L (ref 25–95)
RETICS # AUTO: 145.8 10E9/L (ref 25–95)
RETICS/RBC NFR AUTO: 3.5 % (ref 0.5–2)
RETICS/RBC NFR AUTO: 3.5 % (ref 0.5–2)
SODIUM SERPL-SCNC: 138 MMOL/L (ref 133–144)
SODIUM SERPL-SCNC: 139 MMOL/L (ref 133–144)
SODIUM SERPL-SCNC: 140 MMOL/L (ref 133–144)
SODIUM SERPL-SCNC: 140 MMOL/L (ref 133–144)
SODIUM SERPL-SCNC: 141 MMOL/L (ref 133–144)
SODIUM SERPL-SCNC: 141 MMOL/L (ref 135–146)
SODIUM SERPL-SCNC: 142 MMOL/L (ref 133–144)
SODIUM SERPL-SCNC: 143 MMOL/L (ref 133–144)
SODIUM SERPL-SCNC: 144 MMOL/L (ref 133–144)
SODIUM SERPL-SCNC: 146 MMOL/L (ref 133–144)
SODIUM SERPL-SCNC: 147 MMOL/L (ref 133–144)
SODIUM SERPL-SCNC: 147 MMOL/L (ref 133–144)
SODIUM SERPL-SCNC: 148 MMOL/L (ref 133–144)
SOURCE: ABNORMAL
SOURCE: ABNORMAL
SP GR UR STRIP: 1.02 (ref 1–1.03)
SP GR UR STRIP: 1.05 (ref 1–1.03)
SP. GRAVITY: 1.02
SPECIMEN SOURCE FLD: NORMAL
SPECIMEN SOURCE: ABNORMAL
SPECIMEN SOURCE: NORMAL
SQUAMOUS #/AREA URNS AUTO: 1 /HPF (ref 0–1)
TROPONIN I SERPL-MCNC: 0.04 UG/L (ref 0–0.04)
TSH SERPL DL<=0.005 MIU/L-ACNC: 2.18 MU/L (ref 0.4–4)
URINE VOIDED CULTURE: NORMAL
UROBILINOGEN UR QL STRIP: 0.2 EU/DL (ref 0.2–1)
UROBILINOGEN UR STRIP-MCNC: NEGATIVE MG/DL (ref 0–2)
UROBILINOGEN UR STRIP-MCNC: NORMAL MG/DL (ref 0–2)
WBC # BLD AUTO: 10.5 10E9/L (ref 4–11)
WBC # BLD AUTO: 13.7 10E9/L (ref 4–11)
WBC # BLD AUTO: 2.8 10E9/L (ref 4–11)
WBC # BLD AUTO: 21.6 10E9/L (ref 4–11)
WBC # BLD AUTO: 22.2 10E9/L (ref 4–11)
WBC # BLD AUTO: 4.5 10E9/L (ref 4–11)
WBC # BLD AUTO: 5.6 10E9/L (ref 4–11)
WBC # BLD AUTO: 6.3 10E9/L (ref 4–11)
WBC # BLD AUTO: 6.5 10E9/L (ref 4–11)
WBC # BLD AUTO: 7.1 10E9/L (ref 4–11)
WBC # BLD AUTO: 8.5 10*9/L (ref 4–11)
WBC # BLD AUTO: 9.7 10E9/L (ref 4–11)
WBC # FLD AUTO: NORMAL /UL
WBC #/AREA URNS AUTO: 2 /HPF (ref 0–5)
WBC #/AREA URNS AUTO: 3 /HPF (ref 0–5)
WBC, UR MICRO: ABNORMAL (ref ?–2)

## 2019-01-01 PROCEDURE — 40000986 XR ABDOMEN PORT 1 VW

## 2019-01-01 PROCEDURE — 25000128 H RX IP 250 OP 636: Performed by: INTERNAL MEDICINE

## 2019-01-01 PROCEDURE — 80048 BASIC METABOLIC PNL TOTAL CA: CPT | Performed by: INTERNAL MEDICINE

## 2019-01-01 PROCEDURE — 88112 CYTOPATH CELL ENHANCE TECH: CPT | Performed by: INTERNAL MEDICINE

## 2019-01-01 PROCEDURE — 25000132 ZZH RX MED GY IP 250 OP 250 PS 637: Performed by: INTERNAL MEDICINE

## 2019-01-01 PROCEDURE — 00000146 ZZHCL STATISTIC GLUCOSE BY METER IP

## 2019-01-01 PROCEDURE — 85025 COMPLETE CBC W/AUTO DIFF WBC: CPT | Performed by: EMERGENCY MEDICINE

## 2019-01-01 PROCEDURE — 40000986 XR CHEST PORT 1 VW

## 2019-01-01 PROCEDURE — 99233 SBSQ HOSP IP/OBS HIGH 50: CPT | Performed by: INTERNAL MEDICINE

## 2019-01-01 PROCEDURE — 36415 COLL VENOUS BLD VENIPUNCTURE: CPT | Performed by: INTERNAL MEDICINE

## 2019-01-01 PROCEDURE — 87800 DETECT AGNT MULT DNA DIREC: CPT | Performed by: EMERGENCY MEDICINE

## 2019-01-01 PROCEDURE — 96361 HYDRATE IV INFUSION ADD-ON: CPT

## 2019-01-01 PROCEDURE — 82565 ASSAY OF CREATININE: CPT

## 2019-01-01 PROCEDURE — 97116 GAIT TRAINING THERAPY: CPT | Mod: GP | Performed by: PHYSICAL THERAPIST

## 2019-01-01 PROCEDURE — 12000000 ZZH R&B MED SURG/OB

## 2019-01-01 PROCEDURE — 82805 BLOOD GASES W/O2 SATURATION: CPT

## 2019-01-01 PROCEDURE — P9047 ALBUMIN (HUMAN), 25%, 50ML: HCPCS | Performed by: INTERNAL MEDICINE

## 2019-01-01 PROCEDURE — 97530 THERAPEUTIC ACTIVITIES: CPT | Mod: GP | Performed by: PHYSICAL THERAPIST

## 2019-01-01 PROCEDURE — 84443 ASSAY THYROID STIM HORMONE: CPT | Performed by: EMERGENCY MEDICINE

## 2019-01-01 PROCEDURE — 40000193 ZZH STATISTIC PT WARD VISIT: Performed by: PHYSICAL THERAPIST

## 2019-01-01 PROCEDURE — 84145 PROCALCITONIN (PCT): CPT | Performed by: INTERNAL MEDICINE

## 2019-01-01 PROCEDURE — 82140 ASSAY OF AMMONIA: CPT | Performed by: INTERNAL MEDICINE

## 2019-01-01 PROCEDURE — 99292 CRITICAL CARE ADDL 30 MIN: CPT

## 2019-01-01 PROCEDURE — 85045 AUTOMATED RETICULOCYTE COUNT: CPT | Performed by: EMERGENCY MEDICINE

## 2019-01-01 PROCEDURE — 88305 TISSUE EXAM BY PATHOLOGIST: CPT | Performed by: INTERNAL MEDICINE

## 2019-01-01 PROCEDURE — 96365 THER/PROPH/DIAG IV INF INIT: CPT

## 2019-01-01 PROCEDURE — 93010 ELECTROCARDIOGRAM REPORT: CPT | Performed by: INTERNAL MEDICINE

## 2019-01-01 PROCEDURE — 25000131 ZZH RX MED GY IP 250 OP 636 PS 637: Performed by: INTERNAL MEDICINE

## 2019-01-01 PROCEDURE — 83036 HEMOGLOBIN GLYCOSYLATED A1C: CPT | Performed by: EMERGENCY MEDICINE

## 2019-01-01 PROCEDURE — 93306 TTE W/DOPPLER COMPLETE: CPT

## 2019-01-01 PROCEDURE — 99207 ZZC CDG-MDM COMPONENT: MEETS MODERATE - UP CODED: CPT | Performed by: INTERNAL MEDICINE

## 2019-01-01 PROCEDURE — 81001 URINALYSIS AUTO W/SCOPE: CPT | Performed by: EMERGENCY MEDICINE

## 2019-01-01 PROCEDURE — 99285 EMERGENCY DEPT VISIT HI MDM: CPT | Mod: 25

## 2019-01-01 PROCEDURE — 82248 BILIRUBIN DIRECT: CPT | Performed by: INTERNAL MEDICINE

## 2019-01-01 PROCEDURE — 97530 THERAPEUTIC ACTIVITIES: CPT | Mod: GP | Performed by: PHYSICAL THERAPY ASSISTANT

## 2019-01-01 PROCEDURE — 99223 1ST HOSP IP/OBS HIGH 75: CPT | Mod: AI | Performed by: INTERNAL MEDICINE

## 2019-01-01 PROCEDURE — 84132 ASSAY OF SERUM POTASSIUM: CPT | Performed by: INTERNAL MEDICINE

## 2019-01-01 PROCEDURE — 36415 COLL VENOUS BLD VENIPUNCTURE: CPT | Performed by: FAMILY MEDICINE

## 2019-01-01 PROCEDURE — 36415 COLL VENOUS BLD VENIPUNCTURE: CPT | Performed by: EMERGENCY MEDICINE

## 2019-01-01 PROCEDURE — 85610 PROTHROMBIN TIME: CPT | Performed by: INTERNAL MEDICINE

## 2019-01-01 PROCEDURE — 85652 RBC SED RATE AUTOMATED: CPT | Performed by: EMERGENCY MEDICINE

## 2019-01-01 PROCEDURE — 83010 ASSAY OF HAPTOGLOBIN QUANT: CPT

## 2019-01-01 PROCEDURE — 80053 COMPREHEN METABOLIC PANEL: CPT | Performed by: INTERNAL MEDICINE

## 2019-01-01 PROCEDURE — 99291 CRITICAL CARE FIRST HOUR: CPT | Mod: 25

## 2019-01-01 PROCEDURE — 88112 CYTOPATH CELL ENHANCE TECH: CPT | Mod: 26 | Performed by: INTERNAL MEDICINE

## 2019-01-01 PROCEDURE — 85025 COMPLETE CBC W/AUTO DIFF WBC: CPT | Performed by: INTERNAL MEDICINE

## 2019-01-01 PROCEDURE — 99213 OFFICE O/P EST LOW 20 MIN: CPT | Performed by: FAMILY MEDICINE

## 2019-01-01 PROCEDURE — 97162 PT EVAL MOD COMPLEX 30 MIN: CPT | Mod: GP | Performed by: PHYSICAL THERAPIST

## 2019-01-01 PROCEDURE — 83615 LACTATE (LD) (LDH) ENZYME: CPT

## 2019-01-01 PROCEDURE — 94640 AIRWAY INHALATION TREATMENT: CPT

## 2019-01-01 PROCEDURE — 87077 CULTURE AEROBIC IDENTIFY: CPT | Performed by: INTERNAL MEDICINE

## 2019-01-01 PROCEDURE — 83036 HEMOGLOBIN GLYCOSYLATED A1C: CPT | Performed by: FAMILY MEDICINE

## 2019-01-01 PROCEDURE — 0W9G3ZX DRAINAGE OF PERITONEAL CAVITY, PERCUTANEOUS APPROACH, DIAGNOSTIC: ICD-10-PCS | Performed by: PHYSICIAN ASSISTANT

## 2019-01-01 PROCEDURE — 84157 ASSAY OF PROTEIN OTHER: CPT | Performed by: INTERNAL MEDICINE

## 2019-01-01 PROCEDURE — 83605 ASSAY OF LACTIC ACID: CPT | Performed by: INTERNAL MEDICINE

## 2019-01-01 PROCEDURE — 25000128 H RX IP 250 OP 636: Performed by: NURSE PRACTITIONER

## 2019-01-01 PROCEDURE — 80053 COMPREHEN METABOLIC PANEL: CPT | Performed by: EMERGENCY MEDICINE

## 2019-01-01 PROCEDURE — 82550 ASSAY OF CK (CPK): CPT | Performed by: INTERNAL MEDICINE

## 2019-01-01 PROCEDURE — 87804 INFLUENZA ASSAY W/OPTIC: CPT | Performed by: EMERGENCY MEDICINE

## 2019-01-01 PROCEDURE — 5A1935Z RESPIRATORY VENTILATION, LESS THAN 24 CONSECUTIVE HOURS: ICD-10-PCS

## 2019-01-01 PROCEDURE — 40000193 ZZH STATISTIC PT WARD VISIT: Performed by: PHYSICAL THERAPY ASSISTANT

## 2019-01-01 PROCEDURE — 36556 INSERT NON-TUNNEL CV CATH: CPT

## 2019-01-01 PROCEDURE — G0463 HOSPITAL OUTPT CLINIC VISIT: HCPCS | Mod: 25

## 2019-01-01 PROCEDURE — 3E043XZ INTRODUCTION OF VASOPRESSOR INTO CENTRAL VEIN, PERCUTANEOUS APPROACH: ICD-10-PCS

## 2019-01-01 PROCEDURE — 99291 CRITICAL CARE FIRST HOUR: CPT | Performed by: INTERNAL MEDICINE

## 2019-01-01 PROCEDURE — 25000132 ZZH RX MED GY IP 250 OP 250 PS 637: Performed by: HOSPITALIST

## 2019-01-01 PROCEDURE — 82945 GLUCOSE OTHER FLUID: CPT | Performed by: INTERNAL MEDICINE

## 2019-01-01 PROCEDURE — 99310 SBSQ NF CARE HIGH MDM 45: CPT | Performed by: NURSE PRACTITIONER

## 2019-01-01 PROCEDURE — 96374 THER/PROPH/DIAG INJ IV PUSH: CPT | Mod: 59

## 2019-01-01 PROCEDURE — 25000125 ZZHC RX 250

## 2019-01-01 PROCEDURE — 73630 X-RAY EXAM OF FOOT: CPT | Mod: LT

## 2019-01-01 PROCEDURE — 84460 ALANINE AMINO (ALT) (SGPT): CPT | Performed by: INTERNAL MEDICINE

## 2019-01-01 PROCEDURE — 82105 ALPHA-FETOPROTEIN SERUM: CPT | Performed by: INTERNAL MEDICINE

## 2019-01-01 PROCEDURE — 85027 COMPLETE CBC AUTOMATED: CPT

## 2019-01-01 PROCEDURE — 97116 GAIT TRAINING THERAPY: CPT | Mod: GP

## 2019-01-01 PROCEDURE — 25800025 ZZH RX 258: Performed by: INTERNAL MEDICINE

## 2019-01-01 PROCEDURE — 83735 ASSAY OF MAGNESIUM: CPT | Performed by: INTERNAL MEDICINE

## 2019-01-01 PROCEDURE — 93005 ELECTROCARDIOGRAM TRACING: CPT | Mod: 76

## 2019-01-01 PROCEDURE — 83605 ASSAY OF LACTIC ACID: CPT | Performed by: EMERGENCY MEDICINE

## 2019-01-01 PROCEDURE — 83605 ASSAY OF LACTIC ACID: CPT

## 2019-01-01 PROCEDURE — 25800030 ZZH RX IP 258 OP 636: Performed by: INTERNAL MEDICINE

## 2019-01-01 PROCEDURE — 96367 TX/PROPH/DG ADDL SEQ IV INF: CPT

## 2019-01-01 PROCEDURE — 87075 CULTR BACTERIA EXCEPT BLOOD: CPT | Performed by: INTERNAL MEDICINE

## 2019-01-01 PROCEDURE — 81001 URINALYSIS AUTO W/SCOPE: CPT | Performed by: FAMILY MEDICINE

## 2019-01-01 PROCEDURE — 36600 WITHDRAWAL OF ARTERIAL BLOOD: CPT

## 2019-01-01 PROCEDURE — 25000128 H RX IP 250 OP 636: Performed by: EMERGENCY MEDICINE

## 2019-01-01 PROCEDURE — 85025 COMPLETE CBC W/AUTO DIFF WBC: CPT | Performed by: FAMILY MEDICINE

## 2019-01-01 PROCEDURE — 25000125 ZZHC RX 250: Performed by: PHYSICIAN ASSISTANT

## 2019-01-01 PROCEDURE — 97140 MANUAL THERAPY 1/> REGIONS: CPT | Mod: GP

## 2019-01-01 PROCEDURE — 80048 BASIC METABOLIC PNL TOTAL CA: CPT

## 2019-01-01 PROCEDURE — 80076 HEPATIC FUNCTION PANEL: CPT

## 2019-01-01 PROCEDURE — 90715 TDAP VACCINE 7 YRS/> IM: CPT | Performed by: EMERGENCY MEDICINE

## 2019-01-01 PROCEDURE — 74177 CT ABD & PELVIS W/CONTRAST: CPT

## 2019-01-01 PROCEDURE — 87040 BLOOD CULTURE FOR BACTERIA: CPT | Performed by: EMERGENCY MEDICINE

## 2019-01-01 PROCEDURE — 25000128 H RX IP 250 OP 636

## 2019-01-01 PROCEDURE — 83735 ASSAY OF MAGNESIUM: CPT | Performed by: EMERGENCY MEDICINE

## 2019-01-01 PROCEDURE — 40000847 ZZHCL STATISTIC MORPHOLOGY W/INTERP HISTOLOGY TC 85060: Performed by: INTERNAL MEDICINE

## 2019-01-01 PROCEDURE — 71260 CT THORAX DX C+: CPT

## 2019-01-01 PROCEDURE — 90471 IMMUNIZATION ADMIN: CPT

## 2019-01-01 PROCEDURE — 84484 ASSAY OF TROPONIN QUANT: CPT | Performed by: EMERGENCY MEDICINE

## 2019-01-01 PROCEDURE — 87205 SMEAR GRAM STAIN: CPT | Performed by: INTERNAL MEDICINE

## 2019-01-01 PROCEDURE — 82805 BLOOD GASES W/O2 SATURATION: CPT | Performed by: INTERNAL MEDICINE

## 2019-01-01 PROCEDURE — 82040 ASSAY OF SERUM ALBUMIN: CPT | Performed by: INTERNAL MEDICINE

## 2019-01-01 PROCEDURE — 40000275 ZZH STATISTIC RCP TIME EA 10 MIN

## 2019-01-01 PROCEDURE — 25000125 ZZHC RX 250: Performed by: INTERNAL MEDICINE

## 2019-01-01 PROCEDURE — 25800030 ZZH RX IP 258 OP 636

## 2019-01-01 PROCEDURE — 25800025 ZZH RX 258

## 2019-01-01 PROCEDURE — 99207 ZZC APP CREDIT; MD BILLING SHARED VISIT: CPT | Performed by: INTERNAL MEDICINE

## 2019-01-01 PROCEDURE — 93005 ELECTROCARDIOGRAM TRACING: CPT

## 2019-01-01 PROCEDURE — 87015 SPECIMEN INFECT AGNT CONCNTJ: CPT | Performed by: INTERNAL MEDICINE

## 2019-01-01 PROCEDURE — 89051 BODY FLUID CELL COUNT: CPT | Performed by: INTERNAL MEDICINE

## 2019-01-01 PROCEDURE — 71045 X-RAY EXAM CHEST 1 VIEW: CPT | Mod: 77

## 2019-01-01 PROCEDURE — 80076 HEPATIC FUNCTION PANEL: CPT | Performed by: INTERNAL MEDICINE

## 2019-01-01 PROCEDURE — 87077 CULTURE AEROBIC IDENTIFY: CPT | Performed by: EMERGENCY MEDICINE

## 2019-01-01 PROCEDURE — 97110 THERAPEUTIC EXERCISES: CPT | Mod: GP

## 2019-01-01 PROCEDURE — 87070 CULTURE OTHR SPECIMN AEROBIC: CPT | Performed by: INTERNAL MEDICINE

## 2019-01-01 PROCEDURE — 00000102 ZZHCL STATISTIC CYTO WRIGHT STAIN TC: Performed by: INTERNAL MEDICINE

## 2019-01-01 PROCEDURE — 40000141 ZZH STATISTIC PERIPHERAL IV START W/O US GUIDANCE

## 2019-01-01 PROCEDURE — 40000008 ZZH STATISTIC AIRWAY CARE

## 2019-01-01 PROCEDURE — 83880 ASSAY OF NATRIURETIC PEPTIDE: CPT | Performed by: EMERGENCY MEDICINE

## 2019-01-01 PROCEDURE — 85045 AUTOMATED RETICULOCYTE COUNT: CPT | Performed by: INTERNAL MEDICINE

## 2019-01-01 PROCEDURE — 25000125 ZZHC RX 250: Performed by: EMERGENCY MEDICINE

## 2019-01-01 PROCEDURE — 85060 BLOOD SMEAR INTERPRETATION: CPT | Performed by: INTERNAL MEDICINE

## 2019-01-01 PROCEDURE — 87086 URINE CULTURE/COLONY COUNT: CPT | Mod: 90 | Performed by: FAMILY MEDICINE

## 2019-01-01 PROCEDURE — 97140 MANUAL THERAPY 1/> REGIONS: CPT | Mod: GP | Performed by: PHYSICAL THERAPIST

## 2019-01-01 PROCEDURE — 82803 BLOOD GASES ANY COMBINATION: CPT | Performed by: EMERGENCY MEDICINE

## 2019-01-01 PROCEDURE — 97110 THERAPEUTIC EXERCISES: CPT | Mod: GP | Performed by: PHYSICAL THERAPIST

## 2019-01-01 PROCEDURE — 25000132 ZZH RX MED GY IP 250 OP 250 PS 637

## 2019-01-01 PROCEDURE — 71045 X-RAY EXAM CHEST 1 VIEW: CPT

## 2019-01-01 PROCEDURE — 85610 PROTHROMBIN TIME: CPT

## 2019-01-01 PROCEDURE — 99292 CRITICAL CARE ADDL 30 MIN: CPT | Mod: 25

## 2019-01-01 PROCEDURE — 97116 GAIT TRAINING THERAPY: CPT | Mod: GP | Performed by: PHYSICAL THERAPY ASSISTANT

## 2019-01-01 PROCEDURE — 80053 COMPREHEN METABOLIC PANEL: CPT | Mod: 90 | Performed by: FAMILY MEDICINE

## 2019-01-01 PROCEDURE — P9041 ALBUMIN (HUMAN),5%, 50ML: HCPCS

## 2019-01-01 PROCEDURE — 25800025 ZZH RX 258: Performed by: NURSE PRACTITIONER

## 2019-01-01 PROCEDURE — 84155 ASSAY OF PROTEIN SERUM: CPT | Performed by: INTERNAL MEDICINE

## 2019-01-01 PROCEDURE — 36620 INSERTION CATHETER ARTERY: CPT

## 2019-01-01 PROCEDURE — 20000003 ZZH R&B ICU

## 2019-01-01 PROCEDURE — 85379 FIBRIN DEGRADATION QUANT: CPT | Performed by: EMERGENCY MEDICINE

## 2019-01-01 PROCEDURE — 80069 RENAL FUNCTION PANEL: CPT | Performed by: INTERNAL MEDICINE

## 2019-01-01 PROCEDURE — 82042 OTHER SOURCE ALBUMIN QUAN EA: CPT | Performed by: INTERNAL MEDICINE

## 2019-01-01 PROCEDURE — 96375 TX/PRO/DX INJ NEW DRUG ADDON: CPT

## 2019-01-01 PROCEDURE — 31500 INSERT EMERGENCY AIRWAY: CPT

## 2019-01-01 PROCEDURE — 85384 FIBRINOGEN ACTIVITY: CPT

## 2019-01-01 PROCEDURE — 99239 HOSP IP/OBS DSCHRG MGMT >30: CPT | Performed by: HOSPITALIST

## 2019-01-01 PROCEDURE — 86140 C-REACTIVE PROTEIN: CPT | Performed by: EMERGENCY MEDICINE

## 2019-01-01 PROCEDURE — 00000155 ZZHCL STATISTIC H-CELL BLOCK W/STAIN: Performed by: INTERNAL MEDICINE

## 2019-01-01 PROCEDURE — 27210190 US PARACENTESIS PORTABLE

## 2019-01-01 PROCEDURE — G0463 HOSPITAL OUTPT CLINIC VISIT: HCPCS

## 2019-01-01 PROCEDURE — 87086 URINE CULTURE/COLONY COUNT: CPT | Performed by: EMERGENCY MEDICINE

## 2019-01-01 PROCEDURE — 87186 SC STD MICRODIL/AGAR DIL: CPT | Performed by: INTERNAL MEDICINE

## 2019-01-01 PROCEDURE — 87088 URINE BACTERIA CULTURE: CPT | Mod: 90 | Performed by: FAMILY MEDICINE

## 2019-01-01 PROCEDURE — 97530 THERAPEUTIC ACTIVITIES: CPT | Mod: GP

## 2019-01-01 PROCEDURE — 93306 TTE W/DOPPLER COMPLETE: CPT | Mod: 26 | Performed by: INTERNAL MEDICINE

## 2019-01-01 PROCEDURE — 82040 ASSAY OF SERUM ALBUMIN: CPT | Performed by: EMERGENCY MEDICINE

## 2019-01-01 PROCEDURE — 84450 TRANSFERASE (AST) (SGOT): CPT | Performed by: INTERNAL MEDICINE

## 2019-01-01 PROCEDURE — 99207 ZZC APP CREDIT; MD BILLING SHARED VISIT: CPT | Performed by: NURSE PRACTITIONER

## 2019-01-01 PROCEDURE — 99215 OFFICE O/P EST HI 40 MIN: CPT | Performed by: FAMILY MEDICINE

## 2019-01-01 PROCEDURE — 88305 TISSUE EXAM BY PATHOLOGIST: CPT | Mod: 26 | Performed by: INTERNAL MEDICINE

## 2019-01-01 PROCEDURE — 84075 ASSAY ALKALINE PHOSPHATASE: CPT | Performed by: INTERNAL MEDICINE

## 2019-01-01 PROCEDURE — 85027 COMPLETE CBC AUTOMATED: CPT | Performed by: INTERNAL MEDICINE

## 2019-01-01 PROCEDURE — 87186 SC STD MICRODIL/AGAR DIL: CPT | Performed by: EMERGENCY MEDICINE

## 2019-01-01 PROCEDURE — 82247 BILIRUBIN TOTAL: CPT | Performed by: INTERNAL MEDICINE

## 2019-01-01 PROCEDURE — C9113 INJ PANTOPRAZOLE SODIUM, VIA: HCPCS | Performed by: INTERNAL MEDICINE

## 2019-01-01 PROCEDURE — 97602 WOUND(S) CARE NON-SELECTIVE: CPT

## 2019-01-01 PROCEDURE — 87493 C DIFF AMPLIFIED PROBE: CPT | Performed by: INTERNAL MEDICINE

## 2019-01-01 RX ORDER — ALBUMIN (HUMAN) 12.5 G/50ML
25 SOLUTION INTRAVENOUS EVERY 6 HOURS
Status: COMPLETED | OUTPATIENT
Start: 2019-01-01 | End: 2019-01-01

## 2019-01-01 RX ORDER — DEXTROSE MONOHYDRATE 100 MG/ML
INJECTION, SOLUTION INTRAVENOUS CONTINUOUS
Status: DISCONTINUED | OUTPATIENT
Start: 2019-01-01 | End: 2019-02-21 | Stop reason: HOSPADM

## 2019-01-01 RX ORDER — LOPERAMIDE HCL 2 MG
2 CAPSULE ORAL 4 TIMES DAILY PRN
Status: DISCONTINUED | OUTPATIENT
Start: 2019-01-01 | End: 2019-01-01 | Stop reason: HOSPADM

## 2019-01-01 RX ORDER — NICOTINE POLACRILEX 4 MG
15-30 LOZENGE BUCCAL
Status: DISCONTINUED | OUTPATIENT
Start: 2019-01-01 | End: 2019-01-01 | Stop reason: HOSPADM

## 2019-01-01 RX ORDER — MAGNESIUM SULFATE HEPTAHYDRATE 40 MG/ML
4 INJECTION, SOLUTION INTRAVENOUS EVERY 4 HOURS PRN
Status: DISCONTINUED | OUTPATIENT
Start: 2019-01-01 | End: 2019-01-01 | Stop reason: HOSPADM

## 2019-01-01 RX ORDER — ALBUMIN, HUMAN INJ 5% 5 %
25 SOLUTION INTRAVENOUS ONCE
Status: COMPLETED | OUTPATIENT
Start: 2019-01-01 | End: 2019-01-01

## 2019-01-01 RX ORDER — FUROSEMIDE 10 MG/ML
20 INJECTION INTRAMUSCULAR; INTRAVENOUS EVERY 6 HOURS
Status: COMPLETED | OUTPATIENT
Start: 2019-01-01 | End: 2019-01-01

## 2019-01-01 RX ORDER — IOPAMIDOL 755 MG/ML
500 INJECTION, SOLUTION INTRAVASCULAR ONCE
Status: COMPLETED | OUTPATIENT
Start: 2019-01-01 | End: 2019-01-01

## 2019-01-01 RX ORDER — VANCOMYCIN HYDROCHLORIDE 50 MG/ML
125 KIT ORAL 4 TIMES DAILY
Status: DISCONTINUED | OUTPATIENT
Start: 2019-01-01 | End: 2019-01-01

## 2019-01-01 RX ORDER — ROPINIROLE 0.5 MG/1
0.5 TABLET, FILM COATED ORAL AT BEDTIME
Status: ON HOLD | COMMUNITY
End: 2019-01-01

## 2019-01-01 RX ORDER — FENTANYL CITRATE 50 UG/ML
25-50 INJECTION, SOLUTION INTRAMUSCULAR; INTRAVENOUS
Status: DISCONTINUED | OUTPATIENT
Start: 2019-01-01 | End: 2019-02-21 | Stop reason: HOSPADM

## 2019-01-01 RX ORDER — FLUORIDE TOOTHPASTE
5 TOOTHPASTE DENTAL 4 TIMES DAILY
Status: DISCONTINUED | OUTPATIENT
Start: 2019-01-01 | End: 2019-01-01 | Stop reason: HOSPADM

## 2019-01-01 RX ORDER — NICOTINE POLACRILEX 4 MG
15-30 LOZENGE BUCCAL
Status: DISCONTINUED | OUTPATIENT
Start: 2019-01-01 | End: 2019-02-21 | Stop reason: HOSPADM

## 2019-01-01 RX ORDER — ACETAMINOPHEN 325 MG/1
650 TABLET ORAL EVERY 4 HOURS PRN
Status: DISCONTINUED | OUTPATIENT
Start: 2019-01-01 | End: 2019-02-21 | Stop reason: HOSPADM

## 2019-01-01 RX ORDER — ROPINIROLE 0.5 MG/1
0.5 TABLET, FILM COATED ORAL EVERY 24 HOURS
Qty: 30 TABLET | Refills: 0 | Status: ON HOLD | DISCHARGE
Start: 2019-01-01 | End: 2019-01-01

## 2019-01-01 RX ORDER — ETOMIDATE 2 MG/ML
20 INJECTION INTRAVENOUS ONCE
Status: COMPLETED | OUTPATIENT
Start: 2019-01-01 | End: 2019-01-01

## 2019-01-01 RX ORDER — POTASSIUM CHLORIDE 750 MG/1
TABLET, EXTENDED RELEASE ORAL
Qty: 60 TABLET | Refills: 0 | Status: SHIPPED | OUTPATIENT
Start: 2019-01-01 | End: 2019-01-01

## 2019-01-01 RX ORDER — SPIRONOLACTONE 25 MG/1
50 TABLET ORAL DAILY
Status: DISCONTINUED | OUTPATIENT
Start: 2019-01-01 | End: 2019-01-01 | Stop reason: HOSPADM

## 2019-01-01 RX ORDER — POTASSIUM CHLORIDE 29.8 MG/ML
20 INJECTION INTRAVENOUS
Status: DISCONTINUED | OUTPATIENT
Start: 2019-01-01 | End: 2019-01-01 | Stop reason: HOSPADM

## 2019-01-01 RX ORDER — POTASSIUM CL/LIDO/0.9 % NACL 10MEQ/0.1L
10 INTRAVENOUS SOLUTION, PIGGYBACK (ML) INTRAVENOUS
Status: DISCONTINUED | OUTPATIENT
Start: 2019-01-01 | End: 2019-01-01 | Stop reason: HOSPADM

## 2019-01-01 RX ORDER — ROPINIROLE 0.25 MG/1
0.25 TABLET, FILM COATED ORAL EVERY 24 HOURS
Status: DISCONTINUED | OUTPATIENT
Start: 2019-01-01 | End: 2019-01-01

## 2019-01-01 RX ORDER — DEXTROSE MONOHYDRATE 25 G/50ML
25-50 INJECTION, SOLUTION INTRAVENOUS
Status: DISCONTINUED | OUTPATIENT
Start: 2019-01-01 | End: 2019-01-01 | Stop reason: HOSPADM

## 2019-01-01 RX ORDER — FUROSEMIDE 10 MG/ML
40 INJECTION INTRAMUSCULAR; INTRAVENOUS EVERY 6 HOURS
Status: COMPLETED | OUTPATIENT
Start: 2019-01-01 | End: 2019-01-01

## 2019-01-01 RX ORDER — LATANOPROST 50 UG/ML
1 SOLUTION/ DROPS OPHTHALMIC AT BEDTIME
COMMUNITY

## 2019-01-01 RX ORDER — NALOXONE HYDROCHLORIDE 0.4 MG/ML
.1-.4 INJECTION, SOLUTION INTRAMUSCULAR; INTRAVENOUS; SUBCUTANEOUS
Status: DISCONTINUED | OUTPATIENT
Start: 2019-01-01 | End: 2019-02-21 | Stop reason: HOSPADM

## 2019-01-01 RX ORDER — ONDANSETRON 2 MG/ML
INJECTION INTRAMUSCULAR; INTRAVENOUS
Status: COMPLETED
Start: 2019-01-01 | End: 2019-01-01

## 2019-01-01 RX ORDER — ACETAMINOPHEN 325 MG/1
650 TABLET ORAL EVERY 4 HOURS PRN
Status: DISCONTINUED | OUTPATIENT
Start: 2019-01-01 | End: 2019-01-01 | Stop reason: HOSPADM

## 2019-01-01 RX ORDER — METOPROLOL TARTRATE 25 MG/1
25 TABLET, FILM COATED ORAL AT BEDTIME
Status: DISCONTINUED | OUTPATIENT
Start: 2019-01-01 | End: 2019-01-01 | Stop reason: HOSPADM

## 2019-01-01 RX ORDER — ALBUTEROL SULFATE 90 UG/1
2 AEROSOL, METERED RESPIRATORY (INHALATION) EVERY 4 HOURS PRN
Status: DISCONTINUED | OUTPATIENT
Start: 2019-01-01 | End: 2019-01-01 | Stop reason: HOSPADM

## 2019-01-01 RX ORDER — DEXTROSE MONOHYDRATE 25 G/50ML
25-50 INJECTION, SOLUTION INTRAVENOUS
Status: DISCONTINUED | OUTPATIENT
Start: 2019-01-01 | End: 2019-02-21 | Stop reason: HOSPADM

## 2019-01-01 RX ORDER — ONDANSETRON 4 MG/1
4 TABLET, ORALLY DISINTEGRATING ORAL EVERY 6 HOURS PRN
Status: DISCONTINUED | OUTPATIENT
Start: 2019-01-01 | End: 2019-01-01 | Stop reason: HOSPADM

## 2019-01-01 RX ORDER — IOPAMIDOL 755 MG/ML
91 INJECTION, SOLUTION INTRAVASCULAR ONCE
Status: COMPLETED | OUTPATIENT
Start: 2019-01-01 | End: 2019-01-01

## 2019-01-01 RX ORDER — CEFAZOLIN SODIUM 2 G/100ML
2 INJECTION, SOLUTION INTRAVENOUS EVERY 8 HOURS
Status: COMPLETED | OUTPATIENT
Start: 2019-01-01 | End: 2019-01-01

## 2019-01-01 RX ORDER — ROPINIROLE 0.5 MG/1
0.5 TABLET, FILM COATED ORAL EVERY 24 HOURS
COMMUNITY

## 2019-01-01 RX ORDER — POTASSIUM CHLORIDE 750 MG/1
10 TABLET, EXTENDED RELEASE ORAL 2 TIMES DAILY
Qty: 60 TABLET | Refills: 0 | Status: SHIPPED | OUTPATIENT
Start: 2019-01-01

## 2019-01-01 RX ORDER — SODIUM CHLORIDE 9 MG/ML
INJECTION, SOLUTION INTRAVENOUS CONTINUOUS
Status: DISCONTINUED | OUTPATIENT
Start: 2019-01-01 | End: 2019-02-21 | Stop reason: HOSPADM

## 2019-01-01 RX ORDER — NALOXONE HYDROCHLORIDE 0.4 MG/ML
.1-.4 INJECTION, SOLUTION INTRAMUSCULAR; INTRAVENOUS; SUBCUTANEOUS
Status: DISCONTINUED | OUTPATIENT
Start: 2019-01-01 | End: 2019-01-01

## 2019-01-01 RX ORDER — LOPERAMIDE HCL 2 MG
2 CAPSULE ORAL 4 TIMES DAILY PRN
COMMUNITY

## 2019-01-01 RX ORDER — ROPINIROLE 1 MG/1
1 TABLET, FILM COATED ORAL AT BEDTIME
Qty: 30 TABLET | Refills: 0 | DISCHARGE
Start: 2019-01-01 | End: 2019-03-16

## 2019-01-01 RX ORDER — FUROSEMIDE 10 MG/ML
20 INJECTION INTRAMUSCULAR; INTRAVENOUS ONCE
Status: DISCONTINUED | OUTPATIENT
Start: 2019-01-01 | End: 2019-01-01

## 2019-01-01 RX ORDER — ONDANSETRON 2 MG/ML
4 INJECTION INTRAMUSCULAR; INTRAVENOUS ONCE
Status: COMPLETED | OUTPATIENT
Start: 2019-01-01 | End: 2019-01-01

## 2019-01-01 RX ORDER — CEFAZOLIN SODIUM 1 G/50ML
1250 SOLUTION INTRAVENOUS ONCE
Status: COMPLETED | OUTPATIENT
Start: 2019-01-01 | End: 2019-01-01

## 2019-01-01 RX ORDER — NALOXONE HYDROCHLORIDE 0.4 MG/ML
.1-.4 INJECTION, SOLUTION INTRAMUSCULAR; INTRAVENOUS; SUBCUTANEOUS
Status: DISCONTINUED | OUTPATIENT
Start: 2019-01-01 | End: 2019-01-01 | Stop reason: HOSPADM

## 2019-01-01 RX ORDER — FUROSEMIDE 20 MG
20 TABLET ORAL 2 TIMES DAILY
COMMUNITY
Start: 2019-01-01

## 2019-01-01 RX ORDER — FUROSEMIDE 10 MG/ML
20 INJECTION INTRAMUSCULAR; INTRAVENOUS EVERY 4 HOURS
Status: COMPLETED | OUTPATIENT
Start: 2019-01-01 | End: 2019-01-01

## 2019-01-01 RX ORDER — METOCLOPRAMIDE HYDROCHLORIDE 5 MG/ML
5 INJECTION INTRAMUSCULAR; INTRAVENOUS ONCE
Status: COMPLETED | OUTPATIENT
Start: 2019-01-01 | End: 2019-01-01

## 2019-01-01 RX ORDER — POTASSIUM CHLORIDE 7.45 MG/ML
10 INJECTION INTRAVENOUS
Status: DISCONTINUED | OUTPATIENT
Start: 2019-01-01 | End: 2019-01-01 | Stop reason: HOSPADM

## 2019-01-01 RX ORDER — ONDANSETRON 4 MG/1
4 TABLET, ORALLY DISINTEGRATING ORAL EVERY 6 HOURS PRN
Status: DISCONTINUED | OUTPATIENT
Start: 2019-01-01 | End: 2019-02-21 | Stop reason: HOSPADM

## 2019-01-01 RX ORDER — SODIUM CHLORIDE 9 MG/ML
INJECTION, SOLUTION INTRAVENOUS CONTINUOUS
Status: DISCONTINUED | OUTPATIENT
Start: 2019-01-01 | End: 2019-01-01

## 2019-01-01 RX ORDER — ASPIRIN 81 MG/1
81 TABLET ORAL DAILY
COMMUNITY

## 2019-01-01 RX ORDER — METFORMIN HCL 500 MG
500 TABLET, EXTENDED RELEASE 24 HR ORAL
COMMUNITY
Start: 2019-01-01

## 2019-01-01 RX ORDER — SODIUM CHLORIDE 9 MG/ML
INJECTION, SOLUTION INTRAVENOUS CONTINUOUS
Status: ACTIVE | OUTPATIENT
Start: 2019-01-01 | End: 2019-01-01

## 2019-01-01 RX ORDER — ONDANSETRON 2 MG/ML
4 INJECTION INTRAMUSCULAR; INTRAVENOUS EVERY 6 HOURS PRN
Status: DISCONTINUED | OUTPATIENT
Start: 2019-01-01 | End: 2019-02-21 | Stop reason: HOSPADM

## 2019-01-01 RX ORDER — NICOTINE POLACRILEX 4 MG
15-30 LOZENGE BUCCAL
Status: CANCELLED | OUTPATIENT
Start: 2019-01-01

## 2019-01-01 RX ORDER — FUROSEMIDE 20 MG
20 TABLET ORAL 2 TIMES DAILY
Status: DISCONTINUED | OUTPATIENT
Start: 2019-01-01 | End: 2019-01-01 | Stop reason: HOSPADM

## 2019-01-01 RX ORDER — ROPINIROLE 1 MG/1
1 TABLET, FILM COATED ORAL AT BEDTIME
Status: DISCONTINUED | OUTPATIENT
Start: 2019-01-01 | End: 2019-01-01 | Stop reason: HOSPADM

## 2019-01-01 RX ORDER — ACETAMINOPHEN 650 MG/1
650 SUPPOSITORY RECTAL EVERY 4 HOURS PRN
Status: DISCONTINUED | OUTPATIENT
Start: 2019-01-01 | End: 2019-02-21 | Stop reason: HOSPADM

## 2019-01-01 RX ORDER — POTASSIUM CHLORIDE 750 MG/1
10 TABLET, EXTENDED RELEASE ORAL 2 TIMES DAILY
Status: DISCONTINUED | OUTPATIENT
Start: 2019-01-01 | End: 2019-01-01

## 2019-01-01 RX ORDER — DEXTROSE MONOHYDRATE 25 G/50ML
INJECTION, SOLUTION INTRAVENOUS
Status: COMPLETED
Start: 2019-01-01 | End: 2019-01-01

## 2019-01-01 RX ORDER — POTASSIUM CHLORIDE 1.5 G/1.58G
20-40 POWDER, FOR SOLUTION ORAL
Status: DISCONTINUED | OUTPATIENT
Start: 2019-01-01 | End: 2019-01-01 | Stop reason: HOSPADM

## 2019-01-01 RX ORDER — POLYETHYLENE GLYCOL 3350 17 G/17G
17 POWDER, FOR SOLUTION ORAL DAILY PRN
Status: DISCONTINUED | OUTPATIENT
Start: 2019-01-01 | End: 2019-01-01 | Stop reason: HOSPADM

## 2019-01-01 RX ORDER — CEFTRIAXONE 2 G/1
2 INJECTION, POWDER, FOR SOLUTION INTRAMUSCULAR; INTRAVENOUS ONCE
Status: COMPLETED | OUTPATIENT
Start: 2019-01-01 | End: 2019-01-01

## 2019-01-01 RX ORDER — ROPINIROLE 0.25 MG/1
0.5 TABLET, FILM COATED ORAL AT BEDTIME
Status: DISCONTINUED | OUTPATIENT
Start: 2019-01-01 | End: 2019-01-01

## 2019-01-01 RX ORDER — POTASSIUM CHLORIDE 1500 MG/1
20-40 TABLET, EXTENDED RELEASE ORAL
Status: DISCONTINUED | OUTPATIENT
Start: 2019-01-01 | End: 2019-01-01 | Stop reason: HOSPADM

## 2019-01-01 RX ORDER — VANCOMYCIN HYDROCHLORIDE 50 MG/ML
125 KIT ORAL 4 TIMES DAILY
Status: DISCONTINUED | OUTPATIENT
Start: 2019-01-01 | End: 2019-02-21 | Stop reason: HOSPADM

## 2019-01-01 RX ORDER — DEXTROSE MONOHYDRATE 25 G/50ML
25-50 INJECTION, SOLUTION INTRAVENOUS
Status: CANCELLED | OUTPATIENT
Start: 2019-01-01

## 2019-01-01 RX ORDER — FUROSEMIDE 10 MG/ML
40 INJECTION INTRAMUSCULAR; INTRAVENOUS EVERY 4 HOURS
Status: COMPLETED | OUTPATIENT
Start: 2019-01-01 | End: 2019-01-01

## 2019-01-01 RX ORDER — VANCOMYCIN HYDROCHLORIDE 1 G/200ML
1000 INJECTION, SOLUTION INTRAVENOUS ONCE
Status: DISCONTINUED | OUTPATIENT
Start: 2019-01-01 | End: 2019-01-01

## 2019-01-01 RX ORDER — BRINZOLAMIDE 10 MG/ML
1 SUSPENSION/ DROPS OPHTHALMIC 2 TIMES DAILY
Status: DISCONTINUED | OUTPATIENT
Start: 2019-01-01 | End: 2019-01-01 | Stop reason: HOSPADM

## 2019-01-01 RX ORDER — PIPERACILLIN SODIUM, TAZOBACTAM SODIUM 3; .375 G/15ML; G/15ML
3.38 INJECTION, POWDER, LYOPHILIZED, FOR SOLUTION INTRAVENOUS ONCE
Status: DISCONTINUED | OUTPATIENT
Start: 2019-01-01 | End: 2019-01-01

## 2019-01-01 RX ORDER — ALBUMIN (HUMAN) 12.5 G/50ML
12.5 SOLUTION INTRAVENOUS ONCE
Status: CANCELLED | OUTPATIENT
Start: 2019-01-01 | End: 2019-01-01

## 2019-01-01 RX ORDER — LIDOCAINE HYDROCHLORIDE 10 MG/ML
10 INJECTION, SOLUTION EPIDURAL; INFILTRATION; INTRACAUDAL; PERINEURAL ONCE
Status: COMPLETED | OUTPATIENT
Start: 2019-01-01 | End: 2019-01-01

## 2019-01-01 RX ORDER — SPIRONOLACTONE 50 MG/1
50 TABLET, FILM COATED ORAL 2 TIMES DAILY
Qty: 30 TABLET | Refills: 0 | DISCHARGE
Start: 2019-01-01 | End: 2019-03-16

## 2019-01-01 RX ORDER — ACETAMINOPHEN 325 MG/1
650 TABLET ORAL EVERY 4 HOURS PRN
COMMUNITY

## 2019-01-01 RX ORDER — ONDANSETRON 2 MG/ML
4 INJECTION INTRAMUSCULAR; INTRAVENOUS EVERY 6 HOURS PRN
Status: DISCONTINUED | OUTPATIENT
Start: 2019-01-01 | End: 2019-01-01 | Stop reason: HOSPADM

## 2019-01-01 RX ORDER — CHLORHEXIDINE GLUCONATE ORAL RINSE 1.2 MG/ML
15 SOLUTION DENTAL EVERY 12 HOURS
Status: DISCONTINUED | OUTPATIENT
Start: 2019-01-01 | End: 2019-02-21 | Stop reason: HOSPADM

## 2019-01-01 RX ORDER — FUROSEMIDE 20 MG
20 TABLET ORAL 2 TIMES DAILY
Status: DISCONTINUED | OUTPATIENT
Start: 2019-01-01 | End: 2019-01-01

## 2019-01-01 RX ORDER — PIPERACILLIN SODIUM, TAZOBACTAM SODIUM 3; .375 G/15ML; G/15ML
3.38 INJECTION, POWDER, LYOPHILIZED, FOR SOLUTION INTRAVENOUS EVERY 6 HOURS
Status: DISCONTINUED | OUTPATIENT
Start: 2019-01-01 | End: 2019-02-21 | Stop reason: HOSPADM

## 2019-01-01 RX ORDER — METFORMIN HCL 500 MG
500 TABLET, EXTENDED RELEASE 24 HR ORAL
Status: DISCONTINUED | OUTPATIENT
Start: 2019-01-01 | End: 2019-01-01

## 2019-01-01 RX ORDER — KETOROLAC TROMETHAMINE 15 MG/ML
INJECTION, SOLUTION INTRAMUSCULAR; INTRAVENOUS
Status: DISCONTINUED
Start: 2019-01-01 | End: 2019-01-01 | Stop reason: HOSPADM

## 2019-01-01 RX ORDER — IPRATROPIUM BROMIDE AND ALBUTEROL SULFATE 2.5; .5 MG/3ML; MG/3ML
3 SOLUTION RESPIRATORY (INHALATION)
Status: DISCONTINUED | OUTPATIENT
Start: 2019-01-01 | End: 2019-01-01 | Stop reason: HOSPADM

## 2019-01-01 RX ORDER — MULTIPLE VITAMINS W/ MINERALS TAB 9MG-400MCG
1 TAB ORAL DAILY
Status: DISCONTINUED | OUTPATIENT
Start: 2019-01-01 | End: 2019-01-01 | Stop reason: HOSPADM

## 2019-01-01 RX ORDER — ONDANSETRON 2 MG/ML
INJECTION INTRAMUSCULAR; INTRAVENOUS
Status: DISCONTINUED
Start: 2019-01-01 | End: 2019-01-01 | Stop reason: HOSPADM

## 2019-01-01 RX ORDER — METFORMIN HCL 500 MG
500 TABLET, EXTENDED RELEASE 24 HR ORAL
Qty: 1 TABLET | Refills: 0 | OUTPATIENT
Start: 2019-01-01

## 2019-01-01 RX ORDER — LATANOPROST 50 UG/ML
1 SOLUTION/ DROPS OPHTHALMIC AT BEDTIME
Status: DISCONTINUED | OUTPATIENT
Start: 2019-01-01 | End: 2019-01-01 | Stop reason: HOSPADM

## 2019-01-01 RX ORDER — NOREPINEPHRINE BITARTRATE/D5W 16MG/250ML
0.03-0.4 PLASTIC BAG, INJECTION (ML) INTRAVENOUS CONTINUOUS
Status: DISCONTINUED | OUTPATIENT
Start: 2019-01-01 | End: 2019-02-21 | Stop reason: HOSPADM

## 2019-01-01 RX ORDER — ROPINIROLE 0.25 MG/1
0.5 TABLET, FILM COATED ORAL EVERY 24 HOURS
Status: DISCONTINUED | OUTPATIENT
Start: 2019-01-01 | End: 2019-01-01 | Stop reason: HOSPADM

## 2019-01-01 RX ORDER — SIMVASTATIN 20 MG
20 TABLET ORAL AT BEDTIME
Status: DISCONTINUED | OUTPATIENT
Start: 2019-01-01 | End: 2019-01-01 | Stop reason: HOSPADM

## 2019-01-01 RX ADMIN — Medication 5 ML: at 16:47

## 2019-01-01 RX ADMIN — PHENYLEPHRINE HYDROCHLORIDE 0.5 MCG/KG/MIN: 10 INJECTION INTRAVENOUS at 20:04

## 2019-01-01 RX ADMIN — LATANOPROST 1 DROP: 50 SOLUTION OPHTHALMIC at 21:49

## 2019-01-01 RX ADMIN — DEXTROSE MONOHYDRATE 25 ML: 25 INJECTION, SOLUTION INTRAVENOUS at 05:54

## 2019-01-01 RX ADMIN — BRINZOLAMIDE 1 DROP: 10 SUSPENSION/ DROPS OPHTHALMIC at 22:11

## 2019-01-01 RX ADMIN — METOCLOPRAMIDE 5 MG: 5 INJECTION, SOLUTION INTRAMUSCULAR; INTRAVENOUS at 05:18

## 2019-01-01 RX ADMIN — OMEPRAZOLE 20 MG: 20 CAPSULE, DELAYED RELEASE ORAL at 08:58

## 2019-01-01 RX ADMIN — ALBUTEROL SULFATE 2 PUFF: 90 AEROSOL, METERED RESPIRATORY (INHALATION) at 02:09

## 2019-01-01 RX ADMIN — CEFAZOLIN SODIUM 2 G: 2 INJECTION, SOLUTION INTRAVENOUS at 05:17

## 2019-01-01 RX ADMIN — ACETAMINOPHEN 650 MG: 325 TABLET, FILM COATED ORAL at 16:45

## 2019-01-01 RX ADMIN — SODIUM CHLORIDE 68 ML: 9 INJECTION, SOLUTION INTRAVENOUS at 03:57

## 2019-01-01 RX ADMIN — OMEPRAZOLE 20 MG: 20 CAPSULE, DELAYED RELEASE ORAL at 07:55

## 2019-01-01 RX ADMIN — ROPINIROLE HYDROCHLORIDE 0.5 MG: 0.25 TABLET, FILM COATED ORAL at 22:34

## 2019-01-01 RX ADMIN — FUROSEMIDE 40 MG: 10 INJECTION, SOLUTION INTRAMUSCULAR; INTRAVENOUS at 10:23

## 2019-01-01 RX ADMIN — VANCOMYCIN HYDROCHLORIDE 1250 MG: 5 INJECTION, POWDER, LYOPHILIZED, FOR SOLUTION INTRAVENOUS at 22:15

## 2019-01-01 RX ADMIN — Medication 5 ML: at 17:11

## 2019-01-01 RX ADMIN — SIMVASTATIN 20 MG: 20 TABLET, FILM COATED ORAL at 21:34

## 2019-01-01 RX ADMIN — LATANOPROST 1 DROP: 50 SOLUTION OPHTHALMIC at 21:15

## 2019-01-01 RX ADMIN — ACETAMINOPHEN 650 MG: 325 TABLET, FILM COATED ORAL at 12:14

## 2019-01-01 RX ADMIN — Medication 5 ML: at 08:10

## 2019-01-01 RX ADMIN — FUROSEMIDE 40 MG: 10 INJECTION, SOLUTION INTRAMUSCULAR; INTRAVENOUS at 10:10

## 2019-01-01 RX ADMIN — SIMVASTATIN 20 MG: 20 TABLET, FILM COATED ORAL at 21:13

## 2019-01-01 RX ADMIN — ACETAMINOPHEN 650 MG: 325 TABLET, FILM COATED ORAL at 22:43

## 2019-01-01 RX ADMIN — FUROSEMIDE 20 MG: 20 TABLET ORAL at 12:09

## 2019-01-01 RX ADMIN — BRINZOLAMIDE 1 DROP: 10 SUSPENSION/ DROPS OPHTHALMIC at 07:59

## 2019-01-01 RX ADMIN — SPIRONOLACTONE 50 MG: 25 TABLET, FILM COATED ORAL at 09:33

## 2019-01-01 RX ADMIN — ROPINIROLE HYDROCHLORIDE 0.5 MG: 0.25 TABLET, FILM COATED ORAL at 17:33

## 2019-01-01 RX ADMIN — SODIUM CHLORIDE, POTASSIUM CHLORIDE, SODIUM LACTATE AND CALCIUM CHLORIDE 1000 ML: 600; 310; 30; 20 INJECTION, SOLUTION INTRAVENOUS at 09:18

## 2019-01-01 RX ADMIN — LATANOPROST 1 DROP: 50 SOLUTION OPHTHALMIC at 21:32

## 2019-01-01 RX ADMIN — SODIUM CHLORIDE 1000 ML: 9 INJECTION, SOLUTION INTRAVENOUS at 21:59

## 2019-01-01 RX ADMIN — ONDANSETRON 4 MG: 2 INJECTION INTRAMUSCULAR; INTRAVENOUS at 02:28

## 2019-01-01 RX ADMIN — PANTOPRAZOLE SODIUM 40 MG: 40 INJECTION, POWDER, FOR SOLUTION INTRAVENOUS at 12:26

## 2019-01-01 RX ADMIN — ROPINIROLE HYDROCHLORIDE 0.5 MG: 0.25 TABLET, FILM COATED ORAL at 21:26

## 2019-01-01 RX ADMIN — FUROSEMIDE 20 MG: 10 INJECTION, SOLUTION INTRAMUSCULAR; INTRAVENOUS at 11:10

## 2019-01-01 RX ADMIN — FUROSEMIDE 20 MG: 10 INJECTION, SOLUTION INTRAMUSCULAR; INTRAVENOUS at 11:14

## 2019-01-01 RX ADMIN — ONDANSETRON 4 MG: 4 TABLET, ORALLY DISINTEGRATING ORAL at 10:28

## 2019-01-01 RX ADMIN — SODIUM CHLORIDE 250 ML: 9 INJECTION, SOLUTION INTRAVENOUS at 02:28

## 2019-01-01 RX ADMIN — OMEPRAZOLE 20 MG: 20 CAPSULE, DELAYED RELEASE ORAL at 09:33

## 2019-01-01 RX ADMIN — ALBUMIN HUMAN 25 G: 0.25 SOLUTION INTRAVENOUS at 15:58

## 2019-01-01 RX ADMIN — VANCOMYCIN HYDROCHLORIDE 125 MG: KIT at 14:14

## 2019-01-01 RX ADMIN — METOPROLOL TARTRATE 25 MG: 25 TABLET ORAL at 22:10

## 2019-01-01 RX ADMIN — CEFAZOLIN SODIUM 2 G: 2 INJECTION, SOLUTION INTRAVENOUS at 17:52

## 2019-01-01 RX ADMIN — METOPROLOL TARTRATE 25 MG: 25 TABLET ORAL at 22:34

## 2019-01-01 RX ADMIN — ROPINIROLE HYDROCHLORIDE 0.25 MG: 0.25 TABLET, FILM COATED ORAL at 16:46

## 2019-01-01 RX ADMIN — PIPERACILLIN SODIUM,TAZOBACTAM SODIUM 3.38 G: 3; .375 INJECTION, POWDER, FOR SOLUTION INTRAVENOUS at 12:51

## 2019-01-01 RX ADMIN — FUROSEMIDE 40 MG: 10 INJECTION, SOLUTION INTRAMUSCULAR; INTRAVENOUS at 16:46

## 2019-01-01 RX ADMIN — CEFAZOLIN SODIUM 2 G: 2 INJECTION, SOLUTION INTRAVENOUS at 02:23

## 2019-01-01 RX ADMIN — POTASSIUM CHLORIDE 10 MEQ: 750 TABLET, FILM COATED, EXTENDED RELEASE ORAL at 08:25

## 2019-01-01 RX ADMIN — FUROSEMIDE 20 MG: 20 TABLET ORAL at 09:18

## 2019-01-01 RX ADMIN — METOPROLOL TARTRATE 25 MG: 25 TABLET ORAL at 21:33

## 2019-01-01 RX ADMIN — ACETAMINOPHEN 650 MG: 325 TABLET, FILM COATED ORAL at 08:25

## 2019-01-01 RX ADMIN — ACETAMINOPHEN 650 MG: 325 TABLET, FILM COATED ORAL at 05:31

## 2019-01-01 RX ADMIN — FUROSEMIDE 40 MG: 10 INJECTION, SOLUTION INTRAMUSCULAR; INTRAVENOUS at 16:21

## 2019-01-01 RX ADMIN — ROPINIROLE HYDROCHLORIDE 0.25 MG: 0.25 TABLET, FILM COATED ORAL at 18:25

## 2019-01-01 RX ADMIN — BRINZOLAMIDE 1 DROP: 10 SUSPENSION/ DROPS OPHTHALMIC at 08:59

## 2019-01-01 RX ADMIN — FUROSEMIDE 20 MG: 10 INJECTION, SOLUTION INTRAMUSCULAR; INTRAVENOUS at 13:19

## 2019-01-01 RX ADMIN — CEFAZOLIN SODIUM 2 G: 2 INJECTION, SOLUTION INTRAVENOUS at 21:16

## 2019-01-01 RX ADMIN — CLOSTRIDIUM TETANI TOXOID ANTIGEN (FORMALDEHYDE INACTIVATED), CORYNEBACTERIUM DIPHTHERIAE TOXOID ANTIGEN (FORMALDEHYDE INACTIVATED), BORDETELLA PERTUSSIS TOXOID ANTIGEN (GLUTARALDEHYDE INACTIVATED), BORDETELLA PERTUSSIS FILAMENTOUS HEMAGGLUTININ ANTIGEN (FORMALDEHYDE INACTIVATED), BORDETELLA PERTUSSIS PERTACTIN ANTIGEN, AND BORDETELLA PERTUSSIS FIMBRIAE 2/3 ANTIGEN 0.5 ML: 5; 2; 2.5; 5; 3; 5 INJECTION, SUSPENSION INTRAMUSCULAR at 21:34

## 2019-01-01 RX ADMIN — BRINZOLAMIDE 1 DROP: 10 SUSPENSION/ DROPS OPHTHALMIC at 08:10

## 2019-01-01 RX ADMIN — DEXTROSE MONOHYDRATE 25 ML: 25 INJECTION, SOLUTION INTRAVENOUS at 09:05

## 2019-01-01 RX ADMIN — SPIRONOLACTONE 50 MG: 25 TABLET, FILM COATED ORAL at 08:25

## 2019-01-01 RX ADMIN — SIMVASTATIN 20 MG: 20 TABLET, FILM COATED ORAL at 22:36

## 2019-01-01 RX ADMIN — Medication 5 ML: at 22:32

## 2019-01-01 RX ADMIN — FLUTICASONE FUROATE 1 PUFF: 100 POWDER RESPIRATORY (INHALATION) at 07:49

## 2019-01-01 RX ADMIN — METOPROLOL TARTRATE 25 MG: 25 TABLET ORAL at 00:49

## 2019-01-01 RX ADMIN — Medication 5 ML: at 12:31

## 2019-01-01 RX ADMIN — SIMVASTATIN 20 MG: 20 TABLET, FILM COATED ORAL at 00:49

## 2019-01-01 RX ADMIN — FLUTICASONE FUROATE 1 PUFF: 100 POWDER RESPIRATORY (INHALATION) at 08:00

## 2019-01-01 RX ADMIN — ACETAMINOPHEN 650 MG: 325 TABLET, FILM COATED ORAL at 02:16

## 2019-01-01 RX ADMIN — OMEPRAZOLE 20 MG: 20 CAPSULE, DELAYED RELEASE ORAL at 08:09

## 2019-01-01 RX ADMIN — ROPINIROLE HYDROCHLORIDE 0.25 MG: 0.25 TABLET, FILM COATED ORAL at 17:07

## 2019-01-01 RX ADMIN — Medication 5 ML: at 21:38

## 2019-01-01 RX ADMIN — FUROSEMIDE 40 MG: 10 INJECTION, SOLUTION INTRAMUSCULAR; INTRAVENOUS at 21:49

## 2019-01-01 RX ADMIN — OMEPRAZOLE 20 MG: 20 CAPSULE, DELAYED RELEASE ORAL at 10:23

## 2019-01-01 RX ADMIN — Medication 5 ML: at 12:09

## 2019-01-01 RX ADMIN — CHLORHEXIDINE GLUCONATE 15 ML: 1.2 RINSE ORAL at 19:56

## 2019-01-01 RX ADMIN — LOPERAMIDE HYDROCHLORIDE 2 MG: 2 CAPSULE ORAL at 08:09

## 2019-01-01 RX ADMIN — CEFAZOLIN SODIUM 2 G: 2 INJECTION, SOLUTION INTRAVENOUS at 05:52

## 2019-01-01 RX ADMIN — BRINZOLAMIDE 1 DROP: 10 SUSPENSION/ DROPS OPHTHALMIC at 10:29

## 2019-01-01 RX ADMIN — ALBUTEROL SULFATE 2 PUFF: 90 AEROSOL, METERED RESPIRATORY (INHALATION) at 22:15

## 2019-01-01 RX ADMIN — DEXTROSE MONOHYDRATE 25 ML: 25 INJECTION, SOLUTION INTRAVENOUS at 10:50

## 2019-01-01 RX ADMIN — BRINZOLAMIDE 1 DROP: 10 SUSPENSION/ DROPS OPHTHALMIC at 21:32

## 2019-01-01 RX ADMIN — SODIUM BICARBONATE 25 MEQ/HR: 84 INJECTION, SOLUTION INTRAVENOUS at 19:47

## 2019-01-01 RX ADMIN — FUROSEMIDE 40 MG: 10 INJECTION, SOLUTION INTRAMUSCULAR; INTRAVENOUS at 17:07

## 2019-01-01 RX ADMIN — ALBUMIN HUMAN 25 G: 0.05 INJECTION, SOLUTION INTRAVENOUS at 13:23

## 2019-01-01 RX ADMIN — ROPINIROLE HYDROCHLORIDE 1 MG: 1 TABLET, FILM COATED ORAL at 22:24

## 2019-01-01 RX ADMIN — Medication 5 ML: at 08:25

## 2019-01-01 RX ADMIN — POTASSIUM CHLORIDE 10 MEQ: 750 TABLET, FILM COATED, EXTENDED RELEASE ORAL at 12:27

## 2019-01-01 RX ADMIN — OMEPRAZOLE 20 MG: 20 CAPSULE, DELAYED RELEASE ORAL at 08:25

## 2019-01-01 RX ADMIN — Medication 50 MG: at 14:42

## 2019-01-01 RX ADMIN — FUROSEMIDE 40 MG: 10 INJECTION, SOLUTION INTRAMUSCULAR; INTRAVENOUS at 17:47

## 2019-01-01 RX ADMIN — NOREPINEPHRINE BITARTRATE 0.03 MCG/KG/MIN: 1 INJECTION INTRAVENOUS at 14:05

## 2019-01-01 RX ADMIN — FUROSEMIDE 40 MG: 10 INJECTION, SOLUTION INTRAMUSCULAR; INTRAVENOUS at 10:20

## 2019-01-01 RX ADMIN — LIDOCAINE HYDROCHLORIDE 10 ML: 10 INJECTION, SOLUTION EPIDURAL; INFILTRATION; INTRACAUDAL; PERINEURAL at 10:57

## 2019-01-01 RX ADMIN — ROPINIROLE HYDROCHLORIDE 0.5 MG: 0.25 TABLET, FILM COATED ORAL at 16:59

## 2019-01-01 RX ADMIN — CEFAZOLIN SODIUM 2 G: 2 INJECTION, SOLUTION INTRAVENOUS at 18:00

## 2019-01-01 RX ADMIN — POTASSIUM CHLORIDE 40 MEQ: 20 TABLET, EXTENDED RELEASE ORAL at 12:51

## 2019-01-01 RX ADMIN — BRINZOLAMIDE 1 DROP: 10 SUSPENSION/ DROPS OPHTHALMIC at 08:25

## 2019-01-01 RX ADMIN — SIMVASTATIN 20 MG: 20 TABLET, FILM COATED ORAL at 21:32

## 2019-01-01 RX ADMIN — FLUTICASONE FUROATE 1 PUFF: 100 POWDER RESPIRATORY (INHALATION) at 07:41

## 2019-01-01 RX ADMIN — ROPINIROLE HYDROCHLORIDE 1 MG: 1 TABLET, FILM COATED ORAL at 22:10

## 2019-01-01 RX ADMIN — Medication 5 ML: at 21:34

## 2019-01-01 RX ADMIN — CEFAZOLIN SODIUM 2 G: 2 INJECTION, SOLUTION INTRAVENOUS at 06:34

## 2019-01-01 RX ADMIN — Medication 5 ML: at 12:15

## 2019-01-01 RX ADMIN — OMEPRAZOLE 20 MG: 20 CAPSULE, DELAYED RELEASE ORAL at 09:18

## 2019-01-01 RX ADMIN — POTASSIUM CHLORIDE 10 MEQ: 750 TABLET, FILM COATED, EXTENDED RELEASE ORAL at 07:55

## 2019-01-01 RX ADMIN — ACETAMINOPHEN 650 MG: 325 TABLET, FILM COATED ORAL at 02:24

## 2019-01-01 RX ADMIN — BRINZOLAMIDE 1 DROP: 10 SUSPENSION/ DROPS OPHTHALMIC at 08:26

## 2019-01-01 RX ADMIN — FLUTICASONE FUROATE 1 PUFF: 100 POWDER RESPIRATORY (INHALATION) at 08:55

## 2019-01-01 RX ADMIN — FLUTICASONE FUROATE 1 PUFF: 100 POWDER RESPIRATORY (INHALATION) at 07:27

## 2019-01-01 RX ADMIN — ROPINIROLE HYDROCHLORIDE 0.5 MG: 0.25 TABLET, FILM COATED ORAL at 00:49

## 2019-01-01 RX ADMIN — CEFTRIAXONE SODIUM 2 G: 2 INJECTION, POWDER, FOR SOLUTION INTRAMUSCULAR; INTRAVENOUS at 21:34

## 2019-01-01 RX ADMIN — FUROSEMIDE 20 MG: 10 INJECTION, SOLUTION INTRAMUSCULAR; INTRAVENOUS at 17:33

## 2019-01-01 RX ADMIN — LATANOPROST 1 DROP: 50 SOLUTION OPHTHALMIC at 22:38

## 2019-01-01 RX ADMIN — LOPERAMIDE HYDROCHLORIDE 2 MG: 2 CAPSULE ORAL at 10:23

## 2019-01-01 RX ADMIN — POTASSIUM CHLORIDE 10 MEQ: 750 TABLET, FILM COATED, EXTENDED RELEASE ORAL at 13:44

## 2019-01-01 RX ADMIN — SODIUM CHLORIDE: 9 INJECTION, SOLUTION INTRAVENOUS at 05:59

## 2019-01-01 RX ADMIN — POTASSIUM CHLORIDE 10 MEQ: 750 TABLET, FILM COATED, EXTENDED RELEASE ORAL at 12:51

## 2019-01-01 RX ADMIN — FUROSEMIDE 40 MG: 10 INJECTION, SOLUTION INTRAMUSCULAR; INTRAVENOUS at 12:43

## 2019-01-01 RX ADMIN — ROPINIROLE HYDROCHLORIDE 0.5 MG: 0.25 TABLET, FILM COATED ORAL at 16:21

## 2019-01-01 RX ADMIN — LATANOPROST 1 DROP: 50 SOLUTION OPHTHALMIC at 21:23

## 2019-01-01 RX ADMIN — METOPROLOL TARTRATE 25 MG: 25 TABLET ORAL at 21:13

## 2019-01-01 RX ADMIN — SIMVASTATIN 20 MG: 20 TABLET, FILM COATED ORAL at 22:10

## 2019-01-01 RX ADMIN — ALBUMIN HUMAN 25 G: 0.25 SOLUTION INTRAVENOUS at 10:09

## 2019-01-01 RX ADMIN — MULTIPLE VITAMINS W/ MINERALS TAB 1 TABLET: TAB at 09:34

## 2019-01-01 RX ADMIN — LATANOPROST 1 DROP: 50 SOLUTION OPHTHALMIC at 21:38

## 2019-01-01 RX ADMIN — ACETAMINOPHEN 650 MG: 325 TABLET, FILM COATED ORAL at 04:43

## 2019-01-01 RX ADMIN — CEFAZOLIN SODIUM 2 G: 2 INJECTION, SOLUTION INTRAVENOUS at 06:05

## 2019-01-01 RX ADMIN — ALBUMIN HUMAN 25 G: 0.25 SOLUTION INTRAVENOUS at 08:42

## 2019-01-01 RX ADMIN — POTASSIUM CHLORIDE 10 MEQ: 750 TABLET, FILM COATED, EXTENDED RELEASE ORAL at 08:10

## 2019-01-01 RX ADMIN — ROPINIROLE HYDROCHLORIDE 0.25 MG: 0.25 TABLET, FILM COATED ORAL at 17:38

## 2019-01-01 RX ADMIN — POTASSIUM CHLORIDE 10 MEQ: 750 TABLET, FILM COATED, EXTENDED RELEASE ORAL at 10:23

## 2019-01-01 RX ADMIN — SODIUM CHLORIDE 1000 ML: 9 INJECTION, SOLUTION INTRAVENOUS at 21:34

## 2019-01-01 RX ADMIN — BRINZOLAMIDE 1 DROP: 10 SUSPENSION/ DROPS OPHTHALMIC at 07:56

## 2019-01-01 RX ADMIN — FENTANYL CITRATE 50 MCG: 50 INJECTION, SOLUTION INTRAMUSCULAR; INTRAVENOUS at 21:50

## 2019-01-01 RX ADMIN — POTASSIUM CHLORIDE 10 MEQ: 750 TABLET, FILM COATED, EXTENDED RELEASE ORAL at 13:19

## 2019-01-01 RX ADMIN — POTASSIUM CHLORIDE 20 MEQ: 20 TABLET, EXTENDED RELEASE ORAL at 15:08

## 2019-01-01 RX ADMIN — CEFAZOLIN SODIUM 2 G: 2 INJECTION, SOLUTION INTRAVENOUS at 21:31

## 2019-01-01 RX ADMIN — SPIRONOLACTONE 50 MG: 25 TABLET, FILM COATED ORAL at 10:23

## 2019-01-01 RX ADMIN — FLUTICASONE FUROATE 1 PUFF: 100 POWDER RESPIRATORY (INHALATION) at 08:32

## 2019-01-01 RX ADMIN — FUROSEMIDE 20 MG: 10 INJECTION, SOLUTION INTRAMUSCULAR; INTRAVENOUS at 22:43

## 2019-01-01 RX ADMIN — FLUTICASONE FUROATE 1 PUFF: 100 POWDER RESPIRATORY (INHALATION) at 08:41

## 2019-01-01 RX ADMIN — SPIRONOLACTONE 50 MG: 25 TABLET, FILM COATED ORAL at 08:10

## 2019-01-01 RX ADMIN — ROPINIROLE HYDROCHLORIDE 0.5 MG: 0.25 TABLET, FILM COATED ORAL at 21:39

## 2019-01-01 RX ADMIN — CEFAZOLIN SODIUM 2 G: 2 INJECTION, SOLUTION INTRAVENOUS at 14:02

## 2019-01-01 RX ADMIN — LATANOPROST 1 DROP: 50 SOLUTION OPHTHALMIC at 01:07

## 2019-01-01 RX ADMIN — SODIUM BICARBONATE: 84 INJECTION, SOLUTION INTRAVENOUS at 09:56

## 2019-01-01 RX ADMIN — DEXTROSE MONOHYDRATE: 100 INJECTION, SOLUTION INTRAVENOUS at 12:37

## 2019-01-01 RX ADMIN — FUROSEMIDE 20 MG: 10 INJECTION, SOLUTION INTRAMUSCULAR; INTRAVENOUS at 17:04

## 2019-01-01 RX ADMIN — ACETAMINOPHEN 650 MG: 325 TABLET, FILM COATED ORAL at 22:10

## 2019-01-01 RX ADMIN — ALBUMIN HUMAN 25 G: 0.25 SOLUTION INTRAVENOUS at 16:24

## 2019-01-01 RX ADMIN — DEXMEDETOMIDINE 0.3 MCG/KG/HR: 100 INJECTION, SOLUTION, CONCENTRATE INTRAVENOUS at 15:04

## 2019-01-01 RX ADMIN — ROPINIROLE HYDROCHLORIDE 0.5 MG: 0.25 TABLET, FILM COATED ORAL at 21:32

## 2019-01-01 RX ADMIN — PIPERACILLIN SODIUM,TAZOBACTAM SODIUM 3.38 G: 3; .375 INJECTION, POWDER, FOR SOLUTION INTRAVENOUS at 18:49

## 2019-01-01 RX ADMIN — SODIUM CHLORIDE 65 ML: 9 INJECTION, SOLUTION INTRAVENOUS at 21:15

## 2019-01-01 RX ADMIN — Medication 5 ML: at 07:55

## 2019-01-01 RX ADMIN — Medication 5 ML: at 22:12

## 2019-01-01 RX ADMIN — ALBUMIN HUMAN 25 G: 0.25 SOLUTION INTRAVENOUS at 14:50

## 2019-01-01 RX ADMIN — VASOPRESSIN 2.4 UNITS/HR: 20 INJECTION INTRAVENOUS at 18:04

## 2019-01-01 RX ADMIN — SPIRONOLACTONE 50 MG: 25 TABLET, FILM COATED ORAL at 08:57

## 2019-01-01 RX ADMIN — ROPINIROLE HYDROCHLORIDE 1 MG: 1 TABLET, FILM COATED ORAL at 22:38

## 2019-01-01 RX ADMIN — TOBRAMYCIN SULFATE 400 MG: 40 INJECTION, SOLUTION INTRAMUSCULAR; INTRAVENOUS at 16:56

## 2019-01-01 RX ADMIN — SIMVASTATIN 20 MG: 20 TABLET, FILM COATED ORAL at 21:39

## 2019-01-01 RX ADMIN — OMEPRAZOLE 20 MG: 20 CAPSULE, DELAYED RELEASE ORAL at 08:03

## 2019-01-01 RX ADMIN — FLUTICASONE FUROATE 1 PUFF: 100 POWDER RESPIRATORY (INHALATION) at 07:26

## 2019-01-01 RX ADMIN — FUROSEMIDE 20 MG: 10 INJECTION, SOLUTION INTRAMUSCULAR; INTRAVENOUS at 17:16

## 2019-01-01 RX ADMIN — ALBUTEROL SULFATE 2 PUFF: 90 AEROSOL, METERED RESPIRATORY (INHALATION) at 21:11

## 2019-01-01 RX ADMIN — Medication 1 MG: at 02:24

## 2019-01-01 RX ADMIN — BRINZOLAMIDE 1 DROP: 10 SUSPENSION/ DROPS OPHTHALMIC at 01:10

## 2019-01-01 RX ADMIN — ALBUMIN HUMAN 25 G: 0.25 SOLUTION INTRAVENOUS at 09:33

## 2019-01-01 RX ADMIN — LATANOPROST 1 DROP: 50 SOLUTION OPHTHALMIC at 22:59

## 2019-01-01 RX ADMIN — ALBUMIN HUMAN 25 G: 0.25 SOLUTION INTRAVENOUS at 21:30

## 2019-01-01 RX ADMIN — POTASSIUM CHLORIDE 10 MEQ: 750 TABLET, FILM COATED, EXTENDED RELEASE ORAL at 09:18

## 2019-01-01 RX ADMIN — FUROSEMIDE 40 MG: 10 INJECTION, SOLUTION INTRAMUSCULAR; INTRAVENOUS at 15:08

## 2019-01-01 RX ADMIN — ALBUTEROL SULFATE 2 PUFF: 90 AEROSOL, METERED RESPIRATORY (INHALATION) at 06:25

## 2019-01-01 RX ADMIN — BRINZOLAMIDE 1 DROP: 10 SUSPENSION/ DROPS OPHTHALMIC at 21:27

## 2019-01-01 RX ADMIN — BRINZOLAMIDE 1 DROP: 10 SUSPENSION/ DROPS OPHTHALMIC at 22:24

## 2019-01-01 RX ADMIN — Medication 5 ML: at 08:59

## 2019-01-01 RX ADMIN — BRINZOLAMIDE 1 DROP: 10 SUSPENSION/ DROPS OPHTHALMIC at 21:35

## 2019-01-01 RX ADMIN — SODIUM CHLORIDE 1000 ML: 9 INJECTION, SOLUTION INTRAVENOUS at 06:48

## 2019-01-01 RX ADMIN — Medication 5 ML: at 21:31

## 2019-01-01 RX ADMIN — POTASSIUM CHLORIDE 10 MEQ: 750 TABLET, FILM COATED, EXTENDED RELEASE ORAL at 12:14

## 2019-01-01 RX ADMIN — SIMVASTATIN 20 MG: 20 TABLET, FILM COATED ORAL at 21:14

## 2019-01-01 RX ADMIN — ETOMIDATE 20 MG: 2 INJECTION INTRAVENOUS at 14:42

## 2019-01-01 RX ADMIN — IOPAMIDOL 92 ML: 755 INJECTION, SOLUTION INTRAVENOUS at 21:15

## 2019-01-01 RX ADMIN — SODIUM CHLORIDE, POTASSIUM CHLORIDE, SODIUM LACTATE AND CALCIUM CHLORIDE 500 ML: 600; 310; 30; 20 INJECTION, SOLUTION INTRAVENOUS at 16:49

## 2019-01-01 RX ADMIN — SODIUM CHLORIDE: 9 INJECTION, SOLUTION INTRAVENOUS at 00:19

## 2019-01-01 RX ADMIN — CEFAZOLIN SODIUM 2 G: 2 INJECTION, SOLUTION INTRAVENOUS at 14:48

## 2019-01-01 RX ADMIN — BRINZOLAMIDE 1 DROP: 10 SUSPENSION/ DROPS OPHTHALMIC at 21:52

## 2019-01-01 RX ADMIN — SPIRONOLACTONE 50 MG: 25 TABLET, FILM COATED ORAL at 07:55

## 2019-01-01 RX ADMIN — CEFAZOLIN SODIUM 2 G: 2 INJECTION, SOLUTION INTRAVENOUS at 14:25

## 2019-01-01 RX ADMIN — ACETAMINOPHEN 650 MG: 325 TABLET, FILM COATED ORAL at 08:34

## 2019-01-01 RX ADMIN — METOPROLOL TARTRATE 25 MG: 25 TABLET ORAL at 21:31

## 2019-01-01 RX ADMIN — IOPAMIDOL 91 ML: 755 INJECTION, SOLUTION INTRAVENOUS at 03:57

## 2019-01-01 RX ADMIN — Medication 5 ML: at 13:44

## 2019-01-01 RX ADMIN — BRINZOLAMIDE 1 DROP: 10 SUSPENSION/ DROPS OPHTHALMIC at 22:31

## 2019-01-01 RX ADMIN — SPIRONOLACTONE 50 MG: 25 TABLET, FILM COATED ORAL at 09:18

## 2019-01-01 RX ADMIN — SPIRONOLACTONE 50 MG: 25 TABLET, FILM COATED ORAL at 08:03

## 2019-01-01 RX ADMIN — CEFAZOLIN SODIUM 2 G: 2 INJECTION, SOLUTION INTRAVENOUS at 22:59

## 2019-01-01 RX ADMIN — BRINZOLAMIDE 1 DROP: 10 SUSPENSION/ DROPS OPHTHALMIC at 09:17

## 2019-01-01 RX ADMIN — Medication 5 ML: at 17:08

## 2019-01-01 RX ADMIN — Medication 5 ML: at 20:11

## 2019-01-01 RX ADMIN — METOPROLOL TARTRATE 25 MG: 25 TABLET ORAL at 21:32

## 2019-01-01 RX ADMIN — SODIUM BICARBONATE 8 MEQ/HR: 84 INJECTION, SOLUTION INTRAVENOUS at 12:42

## 2019-01-01 RX ADMIN — Medication 5 ML: at 17:38

## 2019-01-01 RX ADMIN — LATANOPROST 1 DROP: 50 SOLUTION OPHTHALMIC at 22:12

## 2019-01-01 RX ADMIN — ALBUTEROL SULFATE 2 PUFF: 90 AEROSOL, METERED RESPIRATORY (INHALATION) at 08:32

## 2019-01-01 RX ADMIN — BRINZOLAMIDE 1 DROP: 10 SUSPENSION/ DROPS OPHTHALMIC at 09:34

## 2019-01-01 RX ADMIN — ACETAMINOPHEN 650 MG: 325 TABLET, FILM COATED ORAL at 03:29

## 2019-01-01 RX ADMIN — SIMVASTATIN 20 MG: 20 TABLET, FILM COATED ORAL at 21:26

## 2019-01-01 RX ADMIN — ROPINIROLE HYDROCHLORIDE 0.25 MG: 0.25 TABLET, FILM COATED ORAL at 17:08

## 2019-01-01 RX ADMIN — ONDANSETRON 4 MG: 2 INJECTION INTRAMUSCULAR; INTRAVENOUS at 12:23

## 2019-01-01 RX ADMIN — Medication 5 ML: at 07:59

## 2019-01-01 RX ADMIN — CEFAZOLIN SODIUM 2 G: 2 INJECTION, SOLUTION INTRAVENOUS at 10:20

## 2019-01-01 RX ADMIN — BRINZOLAMIDE 1 DROP: 10 SUSPENSION/ DROPS OPHTHALMIC at 20:12

## 2019-01-01 SDOH — SOCIAL STABILITY - SOCIAL INSECURITY: PROBLEMS RELATED TO LIVING ALONE: Z60.2

## 2019-01-01 ASSESSMENT — ACTIVITIES OF DAILY LIVING (ADL)
ADLS_ACUITY_SCORE: 17
ADLS_ACUITY_SCORE: 18
ADLS_ACUITY_SCORE: 16
ADLS_ACUITY_SCORE: 16
ADLS_ACUITY_SCORE: 15
ADLS_ACUITY_SCORE: 18
RETIRED_EATING: 0-->INDEPENDENT
ADLS_ACUITY_SCORE: 16
ADLS_ACUITY_SCORE: 18
TOILETING: 3-->ASSISTIVE EQUIPMENT AND PERSON
ADLS_ACUITY_SCORE: 18
ADLS_ACUITY_SCORE: 19
ADLS_ACUITY_SCORE: 15
ADLS_ACUITY_SCORE: 20
AMBULATION: 1-->ASSISTIVE EQUIPMENT
ADLS_ACUITY_SCORE: 15
ADLS_ACUITY_SCORE: 17
ADLS_ACUITY_SCORE: 17
ADLS_ACUITY_SCORE: 18
ADLS_ACUITY_SCORE: 17
WHICH_OF_THE_ABOVE_FUNCTIONAL_RISKS_HAD_A_RECENT_ONSET_OR_CHANGE?: AMBULATION;TRANSFERRING;TOILETING;BATHING;DRESSING;EATING
ADLS_ACUITY_SCORE: 16
ADLS_ACUITY_SCORE: 18
BATHING: 3-->ASSISTIVE EQUIPMENT AND PERSON
ADLS_ACUITY_SCORE: 18
ADLS_ACUITY_SCORE: 16
ADLS_ACUITY_SCORE: 18
ADLS_ACUITY_SCORE: 16
ADLS_ACUITY_SCORE: 16
ADLS_ACUITY_SCORE: 20
ADLS_ACUITY_SCORE: 20
ADLS_ACUITY_SCORE: 16
ADLS_ACUITY_SCORE: 18
ADLS_ACUITY_SCORE: 15
ADLS_ACUITY_SCORE: 14
DRESS: 2-->ASSISTIVE PERSON
ADLS_ACUITY_SCORE: 16
ADLS_ACUITY_SCORE: 18
ADLS_ACUITY_SCORE: 19
ADLS_ACUITY_SCORE: 20
ADLS_ACUITY_SCORE: 18
ADLS_ACUITY_SCORE: 18
ADLS_ACUITY_SCORE: 16
SWALLOWING: 0-->SWALLOWS FOODS/LIQUIDS WITHOUT DIFFICULTY
ADLS_ACUITY_SCORE: 18
ADLS_ACUITY_SCORE: 25
ADLS_ACUITY_SCORE: 15
ADLS_ACUITY_SCORE: 16
FALL_HISTORY_WITHIN_LAST_SIX_MONTHS: NO
ADLS_ACUITY_SCORE: 18
ADLS_ACUITY_SCORE: 14
ADLS_ACUITY_SCORE: 18
ADLS_ACUITY_SCORE: 18
TRANSFERRING: 3-->ASSISTIVE EQUIPMENT AND PERSON
ADLS_ACUITY_SCORE: 18
ADLS_ACUITY_SCORE: 19
ADLS_ACUITY_SCORE: 17
ADLS_ACUITY_SCORE: 16
ADLS_ACUITY_SCORE: 16
ADLS_ACUITY_SCORE: 15
ADLS_ACUITY_SCORE: 18
ADLS_ACUITY_SCORE: 18
ADLS_ACUITY_SCORE: 17
RETIRED_COMMUNICATION: 0-->UNDERSTANDS/COMMUNICATES WITHOUT DIFFICULTY
COGNITION: 0 - NO COGNITION ISSUES REPORTED
ADLS_ACUITY_SCORE: 18
ADLS_ACUITY_SCORE: 15

## 2019-01-01 ASSESSMENT — ENCOUNTER SYMPTOMS
VOMITING: 1
WOUND: 1
CONFUSION: 1
VOMITING: 0
NAUSEA: 0
CHILLS: 1
MYALGIAS: 1
NUMBNESS: 1
ABDOMINAL PAIN: 1
ABDOMINAL PAIN: 0
DIARRHEA: 1
DIARRHEA: 1
FEVER: 0
NAUSEA: 1
SHORTNESS OF BREATH: 1

## 2019-01-01 ASSESSMENT — MIFFLIN-ST. JEOR
SCORE: 1302.28
SCORE: 1325.41
SCORE: 1318.61
SCORE: 1233.33
SCORE: 1301.82
SCORE: 1311.35
SCORE: 1317.7
SCORE: 1265.08

## 2019-01-10 NOTE — TELEPHONE ENCOUNTER
Spoke with pt. Informed of refill and need for OV for further refills.She said she will call back.

## 2019-01-10 NOTE — TELEPHONE ENCOUNTER
Pending Prescriptions:                       Disp   Refills    KLOR-CON 10 MEQ CR tablet [Pharmacy Med N*60 tab*0            Sig: Take 2 tablets (20 mEq) by mouth daily    Pt is due for a non fasting ov  Please fax 30 and send to KAYLA Reveles  [phone] (home)

## 2019-01-28 NOTE — TELEPHONE ENCOUNTER
Emili Covington called in and left a message for Dr. Chapin stating that the patient does still have a bottle of spironolactone 50 mg that has about 34 tablets left in it. The bottle says that it was filled on 12/24/18 and Emili said that if Dr. Chapin has any other questions or concerns she can give her a call at [phone]. Routing to Dr. Chapin for review.

## 2019-01-28 NOTE — NURSING NOTE
Evy is here today for swollen legs and trouble urinating.    Pre-visit Screening:  Immunizations:  up to date  Colonoscopy:  NA  Mammogram: NA  Asthma Action Test/Plan:  NA  PHQ9:  NA  GAD7:  NA  Questioned patient about current smoking habits Pt. has never smoked.  Ok to leave detailed message on voice mail for today's visit only Yes, phone # [phone] or daughter [phone]

## 2019-01-28 NOTE — PATIENT INSTRUCTIONS
Please look and see if you have any  aldactone(spironolactone) 50 mgm daily/ are you taking it?  Call our office with the answer    Start furosemide 20 mgm with breakfast and then another one at lunch  Today take 3 potassium supplement tablets with your next meal  Then take one daily along with the furosemide at breakfast and lunch    Cut back your metformin to one 500 mgm tablets with dinner    Start oxygen daily    I will call with your labs tomorrow evening  Schedule a recheck fasting appointment in 2 weeks

## 2019-01-28 NOTE — PROGRESS NOTES
SUBJECTIVE: 84 year old female complaining of bilateral lower leg edema for 3 day(s).   The patient describes going to a liver scan appointment and then a visit with pulmonary specialists last week and skipping her daily furosemide. In the last 24 hours she decided to take 3 furosemide tablets and not taking potassium/ however she is not sure she is taking aldactone which was prescribed by GI last 8/2018. In fact I am not sure she is taking any of her medications correctly.  She lives in her own single level town home and has multiple neighbors, friends and her daughter helping her with errands, driving, medications and groceries.    She admits to not eating and getting most of her food by drinks, losing weight slowly this year, craving foods sometimes and not having energy to cook.   The patient denies a history of SOB, chest pain, orthopnea or Gi complaints.  She has noticed her urine is dark and maybe reddish. Does not drink much water.   Smoking history: No.   Relevant past medical history: positive for followed by GI for cirrhosis and they have been filling ? Aldactone, metoprolol, omeprazole and furosemide. Pulmonary refills her inhalers and has ordered home oxygen/ sometimes she uses it. Dr Law group her Requip and LYRICA.    daughter will go home and go through all medications and update me as to what she sees is being taken. reviewed 8/2018 Gi visit.    90 minutes spent trying to get information from patient and daughter  Current Outpatient Medications   Medication     ACE/ARB/ARNI NOT PRESCRIBED, INTENTIONAL,     ALBUTEROL 108 (90 BASE) MCG/ACT inhaler     ARNUITY ELLIPTA 100 MCG/ACT AEPB inhalation powder     ASPIRIN 81 MG OR TABS     AZOPT 1 % OP SUSP     dorzolamide (TRUSOPT) 2 % ophthalmic solution     EPINEPHrine (EPIPEN 2-LALA) 0.3 MG/0.3ML injection     ferrous gluconate (FERGON) 324 (38 FE) MG tablet     furosemide (LASIX) 20 MG tablet     hydrocortisone (WESTCORT) 0.2 % cream     LYRICA 75 MG  capsule     metFORMIN (GLUCOPHAGE-XR) 500 MG 24 hr tablet     metoprolol tartrate (LOPRESSOR) 25 MG tablet     neomycin-polymyxin-dexamethasone (MAXITROL) 3.5-37007-6.1 SUSP ophthalmic susp     omeprazole (PRILOSEC) 20 MG CR capsule     ONETOUCH ULTRA test strip     potassium chloride ER (KLOR-CON) 10 MEQ CR tablet     RESTASIS 0.05 % ophthalmic emulsion     rOPINIRole (REQUIP) 0.25 MG tablet     simvastatin (ZOCOR) 20 MG tablet     spironolactone (ALDACTONE) 50 MG tablet     XALATAN 0.005 % OP SOLN     No current facility-administered medications for this visit.      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   EARS: negative  NOSE/SINUS: Nares normal. Septum midline. Mucosa normal. No drainage or sinus tenderness.   THROAT: normal   NECK:Neck supple. No adenopathy. Thyroid symmetric, normal size,, Carotids without bruits.   CHEST: Regular rate and  rhythm. S1 and S2 normal, no murmurs, clicks, gallops or rubs. No edema or JVD. Chest is clear; no wheezes or rales.   ABD: The abdomen is soft without tenderness, guarding, mass or organomegaly. Bowel sounds are normal. No CVA tenderness or inguinal adenopathy noted.  EXT: 2+ edema to the knee. No color changes  BMI : Body mass index is 31.56 kg/m .     A1C: 5.5  CBC; WNL  Urine pending sample and metabolic panel sent      ASSESSMENT: (R60.0) Pedal edema  (primary encounter  diagnosis)  Comment: call with aldactone use  Plan: VENOUS COLLECTION, CL AFF HEMOGRAM/PLATE/DIFF         (BFP), COMPREHENSIVE METABOLIC PANEL (QUEST)         XCMP, furosemide (LASIX) 20 MG tablet,         potassium chloride ER (KLOR-CON) 10 MEQ CR         tablet, HCL  Urinalysis, Routine (BFP)        Start furosemide bid breakfast and dinner/ potassium twice daily if not on aldactone.  Recheck 1-2 weeks fasting    (K74.60) Cirrhosis, non-alcoholic (H)  Plan: VENOUS COLLECTION, CL AFF HEMOGRAM/PLATE/DIFF         (BFP), COMPREHENSIVE METABOLIC PANEL (QUEST)         XCMP, furosemide (LASIX) 20 MG tablet,         potassium chloride ER (KLOR-CON) 10 MEQ CR         tablet        I have reviewed the patient's medical history in detail and updated the computerized patient record.      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 Options for home care reviewed    (Z87.448) History of urine color changes  Plan: await sample/ ? dehydration

## 2019-01-30 NOTE — PROGRESS NOTES
Clinic Care Coordination Contact    Clinic Care Coordination Contact  OUTREACH    Referral Information:  Referral Source: PCP    Primary Diagnosis: Psychosocial    Chief Complaint   Patient presents with     Clinic Care Coordination - Initial        Universal Utilization: Appropriate utilization  Clinic Utilization  Difficulty keeping appointments:: No  Compliance Concerns: Yes  No-Show Concerns: No  No PCP office visit in Past Year: No  Utilization    Last refreshed: 1/31/2019  8:30 PM:  Hospital Admissions 0           Last refreshed: 1/31/2019  8:30 PM:  ED Visits 0           Last refreshed: 1/31/2019  8:30 PM:  No Show Count (past year) 0              Current as of: 1/31/2019  8:30 PM          Talked to daughter.     Clinical Concerns:  Current Medical Concerns:  Is not taking her medications correctly.    Current Behavioral Concerns: Some memory loss.      Education Provided to patient: Did overview of senior resources and housing options.      Medication Management:  Discussed trying to simplify her medication regime.  Discussed using a medication dispenser that is automated, but daughter does not think that would work well for her.  Would like to have someone with her to oversee.      Functional Status:  Dependent ADLs:: Independent  Dependent IADLs:: Transportation, Medication Management, Shopping, Cooking, Cleaning  Bed or wheelchair confined:: No  Mobility Status: Independent  Fallen 2 or more times in the past year?: No  Any fall with injury in the past year?: No    Living Situation:  Current living arrangement:: I live alone  [facility]    Diet/Exercise/Sleep:  Diet:: Regular  Inadequate nutrition (GOAL):: No  Food Insecurity: No  Tube Feeding: No  Inadequate activity/exercise (GOAL):: No  Significant changes in sleep pattern (GOAL): No    Transportation:  Transportation concerns (GOAL):: No  Transportation means:: Family, Friend, Regular car.  Doesn't drive anymore. Family  drive her and she also gets rides from friends to [employer] events and other social occasions.      Psychosocial:  Mental health DX:: No  Mental health management concern (GOAL):: No  Informal Support system:: Children     Financial/Insurance:   Financial/Insurance concerns (GOAL):: No  UCARE for Seniors, no financial concerns. Can afford to hire in home help.      Resources and Interventions:  Current Resources:    ;   Community Resources: None  Supplies used at home:: None       Advance Care Plan/Directive  Advanced Care Plans/Directives on file:: No    Referrals Placed: Community Resources, Lifeline, Other ( agencies, caregiver support, care connections)     Goals:    Goal Statement: Caregiver will have information on in home care resources.   Measure of Success: My mom will have help at home to remain in her home.   Supportive Steps to Achieve: CC will send resources and caregiver do research on options.   Barriers: Patient has not had in home help before and is apprehensive of strangers.   Strengths: Family support.   Date to Achieve By: June 1, 2019  Patient expressed understanding of goal: caregiver understands.       Patient/Caregiver understanding: Patient would like to remain in her home and not move. Daughter would like to honor that and get enough help so patient is safe and has her needs met at home. Thinks patient is willing to have help at home in order to stay in her home. Daughter wanted information on several resources and wanted it emailed to her:  This is the email:  deondre Ga talking to you yesterday.  Here are resources for the services we discussed:    1. [place] [facility] line, https://www.[facility].org/overarching-care/home-care-and-hospice/lifeline    2. Care Options network is a website with lists of Assisted Living programs and I ve attached links to AV and BV:  Assisted living programs in [place]:  https://www.careoptionsnetwork.org/search/index  For [place]:  https://www.careoptionsnetwork.org/search/index    Questionnaire for touring assisting living: https://seniorhousingguide.us/checklist-for-assisted-living/  They also have online housing guidebook and other useful information.      3. These two agencies will help locate appropriate senior housing options. They are similar to a realtor for senior housing, but don t have fees for the seniors.   a. [employer] - [facility] [employer] [employer] & Lynda Lomeli, Owners ............................................................................................................................  [phone]    www.[employer]  b. [employer]ctions   Stephani Paz CSA ............................................................................................................................  [phone]    Personalized Free Advice for Senior Living Care Options   www.choiceconnectionsmn.Userstorylab     4.  agencies- people to come and be with your mom, help around the house, meals, errands.     [facility] - [facility]. / [place]............................[phone]  Serving [facility]. / [place] & [facility],  [facility] - [place]............................[phone]   Serving the [facility] & [facility],   [facility] [place]............................[phone]  Serving [place] of the [facility] & [facility],     5. Caregiver Resources description   https://metroaging.org/help-information/family-caregiver-resources/    Please let me know you got this information and if you have any questions. I m happy to help and will check with you in a couple of weeks to see how you are doing.  Thanks, RUPESH Leigh/ RUPESH Care Coordinator      Outreach Frequency: monthly  Future Appointments              In 1 week Liz Chapin MD [facility]          Plan: Will contact caregiver in 3-4 weeks to assess needs.  Will ask PCP to  assess need for MTM referral at next visit.      Social Fernando Leigh  [facility]  [email or web address]  [phone]    Clinic Care Coordination Contact  [facility]/Voicemail    Referral Source: PCP  Clinical Data: Care Coordinator Outreach- Patient's daughter (Consent to Contact in media) would like to get resources for in home care for patient.   Outreach attempted x 1.  Left message on daughter's voicemail with call back information and requested return call.  Plan:  Care Coordinator will try to reach caregiver again in 1-2 business days.  Social Fernando Leigh  [facility]  [email or web address]  [phone]

## 2019-01-30 NOTE — TELEPHONE ENCOUNTER
Talked to daughter. She is interested in some home care resources to help her mother eat daily, monitor her medications, etc. Reviewed labs to date and plan. All information sent to GI specialists for upcoming appointment. Call daughter.

## 2019-01-30 NOTE — TELEPHONE ENCOUNTER
Called about potassium and urine result. Will send results to GI specialists/ recheck in 2-3 days.

## 2019-02-04 NOTE — TELEPHONE ENCOUNTER
I called [facility] ( [phone] ) to inquire about the referral process.     Please complete / sign the pending order Home Care.  Please document what you want done in the order    Once this order is signed, [facility] will call patient to schedule appt.     Thank you

## 2019-02-04 NOTE — TELEPHONE ENCOUNTER
Let's see if we can get a home care consult for open wound skin care? Otherwise needs to be seen.  Floridalma , can you call?

## 2019-02-04 NOTE — TELEPHONE ENCOUNTER
I talked to Emili, patients daughter RE referral for home health care.     Home Health Care is coming out 2/5/19

## 2019-02-04 NOTE — TELEPHONE ENCOUNTER
Daughter called and states that she still has swelling. Has gone down some, but now has open sore on her foot. She is wondering if you need to see her or what the next steps should be?    Please advise  Thanks,Brittni Mock - [phone]

## 2019-02-05 PROBLEM — L03.90 CELLULITIS: Status: ACTIVE | Noted: 2019-01-01

## 2019-02-05 NOTE — NURSING NOTE
Evy is here today for a sore on her foot and abdominal pain.    Pre-visit Screening:  Immunizations:  up to date  Colonoscopy:  NA  Mammogram: NA  Asthma Action Test/Plan:  NA  PHQ9:  NA  GAD7:  NA  Questioned patient about current smoking habits Pt. has never smoked.  Ok to leave detailed message on voice mail for today's visit only Yes, phone # [phone]

## 2019-02-05 NOTE — LETTER
"Transition Communication Hand-off for Care Transitions to Next Level of Care Provider    Name: Evy Snog  : 1934  MRN #: [id number]  Primary Care Provider: Liz Chapin     Primary Clinic: [facility] [address] [address]     Reason for Hospitalization:  Portal hypertension (H) [K76.6]  Dehydration [E86.0]  Wound infection [T14.8XXA, L08.9]  Severe sepsis (H) [A41.9, R65.20]  Admit Date/Time: 2019  7:09 PM  Discharge Date: 2019  Payor Source: Payor: [employer] / Plan: UCARE MEDICARE / Product Type: HMO /     Readmission Assessment Measure (LYDIA) Risk Score/category: AVERAGE LYDIA          Reason for Communication Hand-off Referral: Fragility,  This pt has actually been maintaining well with her chronic disease management at home independently- URBAN, Pulm fibrosis. She was identified as having increased care needs on discharge and will transfer to TCU     Discharge Plan: TCU, [place]        Concern for non-adherence with plan of care:   Y/N YES noted per chart review pt intermittently take her Lasix because it \"interferes with her Act ivies.\"  She is in need of Lymphedema management as well as chronic disease education and best self management, her daughter is noted to supportive of pt.   Discharge Needs Assessment:  Needs      Most Recent Value   Equipment Currently Used at Home  walker, rolling, cane, straight, grab bar, tub/shower, shower chair   # of Referrals Placed by CTS  Communication hand-offs to next level of Care Providers         Any outstanding tests or procedures:    Procedures     Future Labs/Procedures    Oxygen - Nasal cannula     Comments:    3 Lpm by nasal cannula to keep O2 sats 92% or greater.          Referrals     Future Labs/Procedures    Occupational Therapy Adult Consult     Comments:    Evaluate and treat as clinically indicated.    Reason:  deconditioning    Physical Therapy Adult Consult     Comments:    Evaluate and treat as clinically " "indicated.    Reason:  deconditioning            Key Recommendations:    Please follow pt closely upon discharge from TCU   Noted per chart review pt \"intermittently take her Lasix because it interferes with Act ivies . She is in need of Lymphedema management as well as chronic disease education and best self management, her daughter is noted to supportive of pt.       Ernestine Vallecillo    AVS/Discharge Summary is the source of truth; this is a helpful guide for improved communication of patient story  "

## 2019-02-05 NOTE — ED TRIAGE NOTES
ABCs intact. Pt hx pulmonary fibrosis, cirrhosis, and DM type 2. Pt c/o wound to L foot x 1 week. Pt states wound is infected and weeping. Pt c/o bilateral lower extremity edema. Pt has not been eating well d/t chronic diarrhea.

## 2019-02-05 NOTE — PATIENT INSTRUCTIONS
Self-care deficit in patient living alone  (primary encounter diagnosis)  Comment: good discussion with patient and daughter that she is unable to stay alone in her home for multiple reasons  Plan: Concern about hepatic encephalopathy with memory issues, using oxygen, nutrition.     (E08.621,  L97.429) Diabetic ulcer of left midfoot associated with diabetes mellitus due to underlying condition, unspecified ulcer stage (H)  Comment: infection noted and poor decision about caring for her feet reviewed  Plan: Need for cleaning regularly, antibiotics, wound care, etc. Options to go to ER and start evaluation, hospitalization with wound care, reviewed. Patient and daughter will go to [place] ER today

## 2019-02-05 NOTE — PROGRESS NOTES
SUBJECTIVE: 84 year old female complaining of since our last week visit her leg swelling slowly was improving and then has worsened again the last 3 days. See that visit.  She noticed a sore on her left forefoot Sunday that was small. Denies remembering an injury.     It was more tender yesterday, so she iced it and then it became very tender with skin sloughing off over night. Daughter had called yesterday and we started home health care evaluation to review her home situation, need for help with ADL's and schedule a home health aide. We encouraged a visit when the foot sore was described.  Today the home health evaluation noticed an infected left foot sore with open ulcer, cleaned and dressed the wound and encouraged daughter to have her in today.     Olive and her daughter are asking for hospitalization. She admits she is unable to reach and care for her lower leg and nails. She does not think she can get to the restroom with diuretic medications changes. Daughter and I have been working on her medication lists which are now correct with dosing from specialists. See multiple visits from neurology, gastroenterology and pulmonary specialists.    Her present edema started 1/25/2019 after she stopped taking her furosemide because she had appointments and did not want to have to go to the restroom while in transport.  This medication has been given by her GI specialists due to cirrhosis along with aldactone and potassium.     The patient describes a hard time for her at home with eating, not being hungry, and diarrhea after eating which causes fecal incontinence. She also has intermittent upper abdominal pain not present now.  She is sleeping well. Has been given oxygen per nasal canula by her pulmonary specialists but doesn't do it very regularly.  The patient denies a history of fever, chills or melena.   Smoking history: No.   Relevant past medical history: positive for type 2 diabetes, cirrhosis with pedal edema  managed by DR Isela Samano, neuropathy/restless legs followed by Dr Law at [facility], and Dr Anderson with [facility].    OBJECTIVE: The patient appears weak, alert, mild distress, cooperative, pale, over weight, fatigued and jaundice. Sitting in wheel chair with wrapped left foot/ no oxygen in place  Obvious tight lower leg edema to the knees  EARS: negative  NOSE/SINUS: Nares normal. Septum midline. Mucosa normal. No drainage or sinus tenderness.   THROAT: normal   NECK:Neck supple. No adenopathy. Thyroid symmetric, normal size,, Carotids without bruits.   CHEST: Regular rate and  rhythm. S1 and S2 normal, no murmurs, clicks, gallops or rubs. No edema or JVD. Chest is clear; no wheezes or rales.  Abd; Obese No palpable pain. Normal bowel sounds  EXT: Bilateral lower leg edema 3+ with tense skin. Left forefoot erythema with a full thickness skin ulcer 3 cm in diameter dorsal aspect at the second metatarsal distal region. No drainage noted on dressings.      ASSESSMENT: (R46.89) Self-care deficit in patient living alone  (primary encounter diagnosis)  Comment: good discussion with patient and daughter that she is unable to stay alone in her home for multiple reasons  Plan: Concern about hepatic encephalopathy with jaundice which include memory issues, not using oxygen, nutrition, etc.     (E08.621,  L97.429) Diabetic ulcer of left midfoot associated with diabetes mellitus due to underlying condition, unspecified ulcer stage (H)  Comment: infection noted and poor decision about caring for her feet reviewed  Plan: Need for cleaning regularly, antibiotics, wound care, etc. Options to go to ER and start evaluation, hospitalization with wound care, reviewed. 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

## 2019-02-06 NOTE — PLAN OF CARE
Orientation: Alert and oriented x4.   VSS. 93% on 2L NC. Afebrile.    LS: diminished but clear throughout  GI: Passing gas. no BM. Denies N/V.   : Adequate urine output. Clear yellow  Skin: bruises and scars. Wound to top of left foot. Minimal amount of drainage.   Activity: 2 assist. Pt slept comfortably throughout shift.   Pain: 0/10. Denies pain throughout shift. No PRN interventions overnight  Plan: Continue with current cares.

## 2019-02-06 NOTE — PROGRESS NOTES
Infection Prevention:    Patient placed on Enteric precautions because of possible Cdiff. Please contact Infection Prevention with any questions/concerns at *[phone].    Candis Wynn, ICP

## 2019-02-06 NOTE — PROGRESS NOTES
[facility] Nurse Inpatient Wound Assessment     Assessment of wound(s) on pt's:   Left Dorsal  Foot        Data:   Patient History:      per MD note(s): 84-year-old female with a history of nonalcoholic steatohepatitis with resultant cirrhosis, obstructive sleep apnea, pulmonary fibrosis on chronic oxygen, diabetes mellitus type 2, on oral medications, who presents to the hospital today for the above concerns.  When asked, the patient why she is here, she listed several issues.  Her primary concern is a left foot wound that has developed over the past week.  It is a wound on the dorsum of her left foot.  It started out as a small blister.  She states that she put some ice on it and she thinks she made it worse.  The blister ruptured.  It now has some surrounding redness and worsening swelling.  She has had no fevers or chills.  There has been some clear drainage from the wound, but no purulence.  She also is concerned about lower extremity edema which sounds more like a chronic process for her.  She has also had decreased oral intake with food not tasting very good.  She also reports a dry mouth.  She also reports ongoing loose stools and diarrhea which primarily occur after eating.  She also has weakness of the bilateral lower extremities which is more of a chronic process as well.      Current Diet / Nutrition:       Orders Placed This Encounter        Moderate Consistent CHO Diet                Ronald Assessment and sub scores:   No Data Recorded     Mattress:  Standard , Atmos Air mattress    Labs:     Recent Labs   Lab Test 02/06/19  0803 02/05/19  1959 02/05/19  1737  01/25/19  0948   ALBUMIN  --  2.4* 2.4*   < > 2.4*   HGB 10.3* 11.8 11.9   < > 12.3   RBC 3.62* 4.20 4.19   < > 4.61   WBC 13.7* 21.6* 22.2*   < > 7.1   * 162 181   < > 139*   INR  --   --   --   --  1.42*   A1C  --  5.7*  --    < >  --    CRP  --   --  90.1*  --   --     < > = values in this interval not displayed.           Wound Assessment (location #1):   Left Dorsal Foot  Wound History:  Pt reports a blister started  Last  Week  And has  Evolved  Into  A   Larger area    Specific Dimensions (length x width x depth, in cm):   1 x 1.8 x 0.1cm moist pink tissue    Periwound Skin: erosion of epidermis and skin flap from broken blister,  3-4+  Pitting edema,  Erythema, very   Dry  Skin to plantar and thick calloused skin     Drainage:   Amount: none,    Odor: none    Pain:  minimal , tender          Intervention:     Patient's chart evaluated.      Wound(s) was assessed    Wound Care:  done:  Per  POC  By RN    Orders  Written    Supplies  ordered: Per POC, gathered, placed at the bedside and discussed with RN    Discussed plan of care with Patient, Family and Nurse          Assessment:       Suspect  Pt has PVD;  Severe edema, does not wear compression hose. Developed a blister that is now unroofed. Mild  perwound  Erythema, on ABX.    Will plan to  Use  Iodosorb  Gel to decrease Bioburden.  Will need to elevate for edema         Plan:     Nursing to notify the Provider(s) and re-consult the WOC Nurse if wound(s) deteriorate(s) or if the wound care plan needs reevaluation.    Plan of care for wound located on Left  foot: Every other Day    1.  Wash foot with soap and water/ Rinse with  Wound Spray    2.  Apply grape amount of Iodosorb Gel to Mepilex Lite (cut  In half to fit area)    3. May need to use Flex Net or sock to keep in place.    WOC Nurse will return: Weekly

## 2019-02-06 NOTE — PROGRESS NOTES
"   02/06/19 1612   Quick Adds   Type of Visit Initial PT Evaluation   Living Environment   Lives With alone   Living Arrangements house   Living Environment Comment Bedroom on main level, walk-in shower with shower chair and grab bars, shower seat. Has a basement she \"hasn't gone down there in years\", unclear if steps to enter home   Self-Care   Usual Activity Tolerance moderate   Current Activity Tolerance fair   Regular Exercise No   Equipment Currently Used at Home walker, rolling;cane, straight;grab bar, tub/shower;shower chair   Activity/Exercise/Self-Care Comment STates she has been using a FWW since last August, just ordered a hurry-cane.  Mod I with ADLs using above equipment.  Has assist for rides, otherwise she reports she does all household chores.   Functional Level Prior   Ambulation 1-->assistive equipment   Transferring 1-->assistive equipment   Toileting 0-->independent   Bathing 1-->assistive equipment   Fall history within last six months no   Which of the above functional risks had a recent onset or change? ambulation;transferring;cognition;toileting;bathing   Prior Functional Level Comment Pt states she is fairly sedentary at baseline, recently was prescribed home O2 but not using it yet.   General Information   Onset of Illness/Injury or Date of Surgery - Date 02/06/19   Referring Physician Idania Hansen MD   Patient/Family Goals Statement pt appears open to return home or TCU pending progress   Pertinent History of Current Problem (include personal factors and/or comorbidities that impact the POC) Evy Song is a 84 year old female with a history of URBAN and associated cirrhosis, mild REY and no current need for CPAP, pulmonary fibrosis, hypertension, type 2 diabetes, and supraventricular tachycardia admitted on 2/5/2019 with multiple complaints including generalized weakness, poor p.o. intake, diarrhea, increased LE swelling, and left foot erythema.  Has open wound dorsum L foot, pt " feeling weak.   Precautions/Limitations fall precautions;oxygen therapy device and L/min   Cognitive Status Examination   Orientation orientation to person, place and time   Cognitive Comment questionable awareness into deficits, difficulty staying on topic with conversation   Pain Assessment   Patient Currently in Pain Yes, see Vital Sign flowsheet  (L heel pain, began on 2/3/19, does not rate)   Integumentary/Edema   Integumentary/Edema Comments B callus on medial MTP joint and hallux, open sore with bandage on dorsum of L foot, pitting edema in B LEs   Posture    Posture Forward head position;Protracted shoulders   Range of Motion (ROM)   ROM Comment BLEs to 90 deg flexion at hips and kness, limited end range flexion due to reported pain, as well as edema, limited ankle ROM due to edema   Strength   Strength Comments generalized weakness in B hips, knees, and ankles.  Grossly 4/5 distally at ankles and knees, 4-/5 hip flexion.   Transfer Skills   Transfer Comments Min A sit <> stand with FWW or [place]   Gait   Gait Comments Amb with FWW and CGA/min A for short distances, short step length, dec foot clearance, mild FF posture, SOB and fatigue noted with activity.   Balance   Balance Comments Impaired standing balance and gait stability, compensating with FWW fairly well, Ax1 for safety.   Coordination   Coordination no deficits were identified   Coordination Comments no functional deficits noted   Muscle Tone   Muscle Tone no deficits were identified   General Therapy Interventions   Planned Therapy Interventions balance training;bed mobility training;gait training;neuromuscular re-education;ROM;strengthening;stretching;transfer training;risk factor education;home program guidelines;progressive activity/exercise   Clinical Impression   Criteria for Skilled Therapeutic Intervention yes, treatment indicated   PT Diagnosis Impaired functional mobility and activity tolerance   Influenced by the following  "impairments L foot pain, edema, dec LE ROM, impaired balance and strength, SOB/O2 needs, questionable cognitive issues?   Functional limitations due to impairments Impaired safety and tolerance with functional transfers, gait skills, fall risk, limited amb tolerance, impaired ADLs   Clinical Presentation Evolving/Changing   Clinical Presentation Rationale fluctuating medical status, inc O2 needs, edema and open sores, deconditioning, multiple body systems involved   Clinical Decision Making (Complexity) Moderate complexity   Therapy Frequency` daily   Predicted Duration of Therapy Intervention (days/wks) 3-5 days   Anticipated Equipment Needs at Discharge (owns FWW and SEC)   Anticipated Discharge Disposition Transitional Care Facility  (pending progress)   Risk & Benefits of therapy have been explained Yes   Patient, Family & other staff in agreement with plan of care Yes   [facility] TM \"6 Clicks\"   2016, Trustees of [facility], under license to [employer].  All rights reserved.   6 Clicks Short Forms Basic Mobility Inpatient Short Form   [facility] AM-PAC  \"6 Clicks\" V.2 Basic Mobility Inpatient Short Form   1. Turning from your back to your side while in a flat bed without using bedrails? 3 - A Little   2. Moving from lying on your back to sitting on the side of a flat bed without using bedrails? 3 - A Little   3. Moving to and from a bed to a chair (including a wheelchair)? 3 - A Little   4. Standing up from a chair using your arms (e.g., wheelchair, or bedside chair)? 3 - A Little   5. To walk in hospital room? 3 - A Little   6. Climbing 3-5 steps with a railing? 3 - A Little   Basic Mobility Raw Score (Score out of 24.Lower scores equate to lower levels of function) 18   Total Evaluation Time   Total Evaluation Time (Minutes) 15     "

## 2019-02-06 NOTE — ED PROVIDER NOTES
History     Chief Complaint:  Wound Infection      HPI   Evy Song is a 84 year old female with a complicated past medical history, most notably diabetes complicated by peripheral neuropathy, cirrhosis, portal hypertension who presents with her daughter and son in law for evaluation of a wound infection characterized by weeping and swelling to the area. The patient has had this wound for about 1 week. She states that throughout the duration of her symptoms she has not had any fevers or numbness except around the foot. She uses a walker to ambulate but tends to stub her toes against it she notes. She states that she is usually feeling chilled though this is more chronic. She reports that since Christmas she has had non bloody diarrhea and intermittent abdominal pain as well. No exacerbating/relieving factors. She currently lives in a Town home. Family is in the process of setting up with an in care home nurse. No recent hospitalizations or recent antibiotics.     Of note: The patient went to see her Primary care Provider today who recommended presentation.       Allergies:  Monosodium Glutamate  Timolol  Bee Venom  Benadryl [Diphenhydramine]  Celecoxib      Medications:    Albuterol 108 (90 Base) Mcg/Act Inhaler  Arnuity Ellipta 100 Mcg/Act Aepb Inhalation Powder  Aspirin 81 Mg Or Tabs  Azopt 1 % Op Susp  Dorzolamide (Trusopt) 2 % Ophthalmic Solution  Epinephrine (Epipen 2-Lars) 0.3 Mg/0.3ml Injection  Ferrous Gluconate (Fergon) 324 (38 Fe) Mg Tablet  Furosemide (Lasix) 20 Mg Tablet  Hydrocortisone (Westcort) 0.2 % Cream  Lyrica 75 Mg Capsule  Metformin (Glucophage-Xr) 500 Mg 24 Hr Tablet  Metoprolol Tartrate (Lopressor) 25 Mg Tablet  Neomycin-Polymyxin-Dexamethasone (Maxitrol) 3.5-47886-8.1 Susp Ophthalmic Susp  Omeprazole (Prilosec) 20 Mg Cr Capsule  Onetouch Ultra Test Strip  Potassium Chloride Er (Klor-Con) 10 Meq Cr Tablet  Restasis 0.05 % Ophthalmic Emulsion  Ropinirole (Requip) 0.25 Mg  "Tablet  Simvastatin (Zocor) 20 Mg Tablet  Spironolactone (Aldactone) 50 Mg Tablet  Xalatan 0.005 % Op Soln     Past Medical History:    Arthritis   Cirrhosis (H)   Diabetes mellitus (H)   Essential hypertension   Glaucoma   HLD (hyperlipidemia)   Irritable bowel syndrome   Obesity   REY (obstructive sleep apnea)   Polyneuropathy   Pulmonary fibrosis (H)   SOB (shortness of breath)   SVT (supraventricular tachycardia) (H)   Tachycardia   IPF  TMJ  Cirrhosis    Past Surgical History:    C Appendectomy  Cataract surgery  Colonoscopy  Laparoscopic cholecystectomy  ORIF ankle foot and toe surgery    Family History:    Cancer  Diabetes  Fibromyalgia    Social History:  The patient  reports that  has never smoked. she has never used smokeless tobacco. She reports that she does not drink alcohol or use drugs.   Marital Status:   [5]     Review of Systems   Constitutional: Positive for chills. Negative for fever.   Gastrointestinal: Positive for abdominal pain and diarrhea (Chronic). Negative for nausea and vomiting.   Skin: Positive for wound.   Neurological: Positive for numbness (chronic to bilateral lower extremities secondary to DM).   All other systems reviewed and are negative.      Physical Exam     Vital signs  Patient Vitals for the past 24 hrs:   BP Temp Temp src Pulse Resp SpO2 Height Weight   02/05/19 2200 104/54 -- -- -- -- 92 % -- --   02/05/19 2155 104/54 -- -- 78 -- 91 % -- --   02/05/19 2150 92/44 -- -- 76 -- 92 % -- --   02/05/19 2135 (!) 81/43 -- -- -- -- -- -- --   02/05/19 1720 114/53 97.7  F (36.5  C) Oral 81 18 93 % 1.626 m (5' 4\") 83 kg (183 lb)          Physical Exam  Nursing note and vitals reviewed.  Constitutional: Well nourished. Resting comfortably.   Eyes: Conjunctiva normal.  Pupils are equal, round, and reactive to light.   ENT: Nose normal. Mucous membranes pink and moist.    Neck: Normal range of motion.  CVS: Normal rate, regular rhythm.  Normal heart sounds.  No murmur. 1/2 DP " pulses bilaterally  Pulmonary: Diminished bilaterally. No wheezes/rales/rhonchi.  GI: Abdomen soft. Minimal generalized tenderness. No rigidity or guarding.    MSK: No calf tenderness; +2 lower extremity edema  Neuro: Alert. Follows simple commands. Sensation diminished to bilateral feet, chronic per patient.   Skin: Skin is warm and dry. No rash noted. See picture below for noted wound to dorsum of L. Foot. Minimal surrounding erythema, no fluctuance or warmth    Psychiatric: Normal affect.           Emergency Department Course       Imaging:  Abd/pelvis CT,  IV  contrast only TRAUMA / AAA   Preliminary Result   IMPRESSION:    1. Cirrhotic-appearing liver with evidence of portal hypertension   including prominent gastroesophageal varices.   2. A moderate amount of free intraperitoneal fluid.   3. No other cause of acute pain identified in the abdomen or pelvis.      Foot XR, G/E 3 views, left   Final Result   IMPRESSION: Osteopenia. No evidence of fracture. Postoperative changes   in the ankle with a screw in the distal fibula and old healed distal   fibular fracture. Diffuse soft tissue swelling over the dorsum of the   forefoot and midfoot with no soft tissue air.      TORY SAUCEDO MD        Results as read by radiology.   I communicated the results of the imaging studies with the patient who expressed understanding of these findings.      Laboratory:  Lactate : 5.0  Blood Culture: pending x 2    CBC: WBC 22.2, RDW 24.3, Absolute Neutrophil 19.0, Absolute Monocytes 1.6, otherwise within normal limits   CMP: Glucose 117, Creatinine 1.14, GFR Estimate 44, Bilirubin Total 8.2, Albumin 2.4, Protein Total 5.2, AST 49, otherwise within normal limits   Erythrocyte Sedimentation rate 7  CRP Inflammation 90.1  UA: o/w WNL    Interventions:  1953: Vancomycin 1000 mg   2134: Rocephin 2g  2134: Tdap   2134: NS 1L IV Bolus       Emergency Department Course:  Past medical records, nursing notes, and vitals reviewed.  1940: I  performed an exam of the patient and obtained history, as documented above.  Basic lab work already obtained from triage, I added a lactic as has not been collected.       2024: I was informed of the Critical Lactate result. Additional IVF bolus ordered.     2200: Patient without complaints; requesting PO challenge    2310: Patient was accepted by hospitalist Dr. Yoo for admission.  Patient and family agreeable to admission. I discussed results/testing with patient and family.       Impression & Plan      CMS Diagnoses: The patient has signs of Severe Sepsis as evidenced by:    1. 2 SIRS criteria, AND  2. Suspected infection, AND   3. Organ dysfunction: Lactic Acid > 1.9    Time severe sepsis diagnosis confirmed = 1753 as this was the time when Lactate resulted, and the level was > 1.9      3 Hour Severe Sepsis Bundle Completion:  1. Initial Lactic Acid Result:   Recent Labs   Lab Test 02/05/19 2150 02/05/19 1951   LACT 4.6* 5.0*     2. Blood Cultures before Antibiotics: Yes  3. Broad Spectrum Antibiotics Administered: Yes     4. 2000 ml of IV fluids.  Ideal body weight: 54.7 kg (120 lb 9.5 oz)  Adjusted ideal body weight: 66 kg (145 lb 8.9 oz)    Severe Sepsis reassessment:  1. Repeat Lactic Acid Level:4.6  2. MAP>65 after initial IVF bolus, will continue to monitor fluid status and vital signs      Medical Decision Making:  Evy Song is a 84 year old female with complicated past medical history presenting with a myriad of complaints, predominantly diarrhea and concerns for a wound infection. She has labile BP on arrival though is nontoxic appearing. She has a non peritoneal abdominal exam. The patient underwent an extensive work up during her time in the ED. Leukocytosis noted and an initial lactate of >5. Concerns for severe sepsis, strong suspicion that the source is foot cellulitis. I did do an informal bed side ultrasound which was without evidence of abscess. There is no soft tissue gas on xray.  She was provided Rocephin and Vancomycin after blood culture collection. CT Abdomen was with noted cirrhosis and moderate free intraperitoneal fluid likely consistent with ascites. She has no significant abdominal pain, clinically I doubt SBP.  The lactate I suspect is elevated in the setting of sepsis.  I also have concerns, however, that the patient will not clear her lactate given a history of cirrhosis. Her repeat lactate was slowly improving after IVF.  She did not require pressor support during her time in the ED.  She was mentating well and MAPS>60.  I was judicious with IVF in setting patient clinically appears intravascularly depleted though extravascularly patient quite edematous.  Also considered C. Diff colitis given reported diarrhea though she was unable to provide stool sample in the ED. She was admitted to Hospitalist for further management. The patient is a full code as was discussed with herself and family.     Diagnosis:    ICD-10-CM        ICD-10-CM    1. Wound infection T14.8XXA Lactic acid whole blood   2. Severe sepsis (H) A41.9     R65.20    3. Dehydration E86.0    4. Portal hypertension (H) K76.6    5. Diarrhea, unspecified type R19.7        Critical Care time was 35 minutes for this patient excluding procedures.    Disposition:  Admitted to hospital  I, Fatmata Julien, trini serving as a scribe at 9:46 PM on 2/5/2019 to document services personally performed by Arlen Matthews DO based on my observations and the provider's statements to me.    [facility] EMERGENCY DEPARTMENT         Arlne Matthews DO  02/07/19 1942

## 2019-02-06 NOTE — ED NOTES
[facility]  ED Nurse Handoff Report    Evy Song is a 84 year old female   ED Chief complaint: Wound Infection  . ED Diagnosis:   Final diagnoses:   Wound infection   Severe sepsis (H)   Dehydration   Portal hypertension (H)     Allergies:   Allergies   Allergen Reactions     Monosodium Glutamate Cough and Shortness Of Breath     Timolol Rash     Bee Venom      hives,anaphylaxis     Benadryl [Diphenhydramine] Other (See Comments)     Lightheaded     Celecoxib      spasms       Code Status: Full Code  Activity level - Baseline/Home:  Independent. Activity Level - Current:   Stand with Assist. Lift room needed: No. Bariatric: No   Needed: No   Isolation: No. Infection: Not Applicable.     Vital Signs:   Vitals:    02/05/19 2135 02/05/19 2150 02/05/19 2155 02/05/19 2200   BP: (!) 81/43 92/44 104/54 104/54   Pulse:  76 78    Resp:       Temp:       TempSrc:       SpO2:  92% 91% 92%   Weight:       Height:           Cardiac Rhythm:  ,      Pain level:    Patient confused: No. Patient Falls Risk: Yes.   Elimination Status: Toiletings offered, pt says she sometimes has difficulty with urination so straight cath for UA.   Patient Report - Initial Complaint: Wound check. Focused Assessment:    Provider note:  Evy Song is a 84 year old female with a complicated past medical history, most notably diabetes complicated by peripheral neuropathy, cirrhosis, portal hypertension who presents with her daughter and son in law for evaluation of a wound infection characterized by weeping and swelling to the area. The patient has had this wound for about 1 week, with a short period of improvement after which the patient started to worsen in her symptoms. She states that throughout the duration of her symptoms she has not had any fevers or numbness except around the foot. She uses a walker to ambulate but tends to stub her toes against it she notes. She states that she is usually feeling chilled. She  reports that since Christmas she has had nonbloody diarrhea and intermittent abdominal pain. She currently lives in a Town home. Family is in the process of setting up with an in care home nurse. No recent hospitalizations or recent antibiotics.    Tests Performed: EKG, UA, CT, Labs. Abnormal Results:   Labs Ordered and Resulted from Time of ED Arrival Up to the Time of Departure from the ED   CBC WITH PLATELETS DIFFERENTIAL - Abnormal; Notable for the following components:       Result Value    WBC 22.2 (*)     RDW 24.3 (*)     Absolute Neutrophil 19.0 (*)     Absolute Monocytes 1.6 (*)     All other components within normal limits   COMPREHENSIVE METABOLIC PANEL - Abnormal; Notable for the following components:    Glucose 117 (*)     Creatinine 1.14 (*)     GFR Estimate 44 (*)     GFR Estimate If Black 51 (*)     Bilirubin Total 8.2 (*)     Albumin 2.4 (*)     Protein Total 5.2 (*)     AST 49 (*)     All other components within normal limits   CRP INFLAMMATION - Abnormal; Notable for the following components:    CRP Inflammation 90.1 (*)     All other components within normal limits   LACTIC ACID WHOLE BLOOD - Abnormal; Notable for the following components:    Lactic Acid 5.0 (*)     All other components within normal limits   ROUTINE UA WITH MICROSCOPIC - Abnormal; Notable for the following components:    Mucous Urine Present (*)     Hyaline Casts 54 (*)     All other components within normal limits   LACTIC ACID WHOLE BLOOD - Abnormal; Notable for the following components:    Lactic Acid 4.6 (*)     All other components within normal limits   ERYTHROCYTE SEDIMENTATION RATE AUTO   BLOOD CULTURE   BLOOD CULTURE     Abd/pelvis CT,  IV  contrast only TRAUMA / AAA   Preliminary Result   IMPRESSION:    1. Cirrhotic-appearing liver with evidence of portal hypertension   including prominent gastroesophageal varices.   2. A moderate amount of free intraperitoneal fluid.   3. No other cause of acute pain identified in the  abdomen or pelvis.      Foot XR, G/E 3 views, left   Final Result   IMPRESSION: Osteopenia. No evidence of fracture. Postoperative changes   in the ankle with a screw in the distal fibula and old healed distal   fibular fracture. Diffuse soft tissue swelling over the dorsum of the   forefoot and midfoot with no soft tissue air.      TORY SAUCEDO MD      .   Treatments provided: IV fluids, abx, monitoring   Family Comments: Family at bedside   OBS brochure/video discussed/provided to patient:  N/A  ED Medications:   Medications   vancomycin (VANCOCIN) 1,250 mg in sodium chloride 0.9 % 250 mL intermittent infusion (1,250 mg Intravenous New Bag 2/5/19 2215)   0.9% sodium chloride BOLUS (1,000 mLs Intravenous New Bag 2/5/19 2159)   cefTRIAXone (ROCEPHIN) 2 g vial to attach to  ml bag for ADULTS or NS 50 ml bag for PEDS (0 g Intravenous Stopped 2/5/19 2203)   Tdap (tetanus-diphtheria-acell pertussis) (ADACEL) injection 0.5 mL (0.5 mLs Intramuscular Given 2/5/19 2134)   0.9% sodium chloride BOLUS (0 mLs Intravenous Stopped 2/5/19 2200)   0.9% sodium chloride BOLUS (0 mLs Intravenous Stopped 2/5/19 2120)   iopamidol (ISOVUE-370) solution 500 mL (92 mLs Intravenous Given 2/5/19 2115)     Drips infusing:  Yes, vanco  For the majority of the shift, the patient's behavior Green. Interventions performed were NA.     Severe Sepsis OR Septic Shock Diagnosis Present:   Yes    Per the ED Provider, Time Zero for severe sepsis or septic shock is:  2024    3 Hour Severe Sepsis Bundle Completion:  1. Initial Lactic Acid Result:   Recent Labs   Lab Test 02/05/19 2150 02/05/19 1951   LACT 4.6* 5.0*     2. Blood Cultures before Antibiotics: Yes  3. Broad Spectrum Antibiotics Administered:     Anti-infectives (From now, onward)    Start     Dose/Rate Route Frequency Ordered Stop    02/05/19 1959  vancomycin (VANCOCIN) 1,250 mg in sodium chloride 0.9 % 250 mL intermittent infusion      1,250 mg  over 90 Minutes Intravenous ONCE  02/05/19 1958          4. 2100 ml of IV fluids have been given so far      6 Hour Severe Sepsis Bundle Completion:    1. Repeat Lactic Acid Level:   Last result   Lab Results   Component Value Date    LACT 4.6 () 02/05/2019     2. Patient currently on Vasopressors =  No      ED Nurse Name/Phone Number: Aurelio Palomo,   10:34 PM  RECEIVING UNIT ED HANDOFF REVIEW    Above ED Nurse Handoff Report was reviewed: YES  Reviewed by: Vivienne Dupree on February 5, 2019 at 11:01 PM

## 2019-02-06 NOTE — PLAN OF CARE
PT:  Eval complete, treatment initiated.  Pt admitted with generalized weakness, L foot wound/swelling, edema, diarrhea.  [facility] functions fairly independently at baseline, using FWW or SEC.  Pt states she follows with a pulmonologist and was recently prescribed home O2, has not received it yet.    Discharge Planner PT   Patient plan for discharge: open to home or TCU if needed  Current status: Pt demonstrates generalized deconditioning/weakness.  Pitting edema noted BLEs.  SOB and fatigue with activity, requiring 2-2.5lpm O2, SaO2 down ot 80% on RA, required several minutes rest with PLB to recover > 90%.  Min A with functional transfers and amb x 70ft with FWW.  Legs elevated, recommend pt is assisted up to chair and amb with nursing staff 3x/day.  Barriers to return to prior living situation: lives alone, limited functional mobility and activity tolerance, questionable cognitive concerns  Recommendations for discharge: TCU  Rationale for recommendations: Currently functioning below baseline, will need ongoing skilled PT intervention to improve independence and safety with functional mobility skills prior to returning home.        Entered by: Faizan Zaragoza 02/06/2019 5:11 PM

## 2019-02-06 NOTE — PHARMACY-ADMISSION MEDICATION HISTORY
Admission medication history interview status for this patient is complete. See [facility] admission navigator for allergy information, prior to admission medications and immunization status.     Medication history interview source(s):Patient and Family  Medication history resources (including written lists, pill bottles, clinic record):None    Changes made to PTA medication list:  Added: aldactone  Deleted: trusopt, Fergon, Maxitrol, Restasis  Changed: requip,     Actions taken by pharmacist (provider contacted, etc):None     Additional medication history information:Azopt has to be Brand only,    Medication reconciliation/reorder completed by provider prior to medication history? No    For patients on insulin therapy: no (Yes/No)   Lantus/levemir/NPH/Mix 70/30 dose: ___ in AM/PM or twice daily   Sliding scale Novolog Y/N   If Yes, do you have a baseline novolog pre-meal dose: ______units with meals   Patients eat three meals a day: Y/N ---  How many episodes of hypoglycemia (low blood glucose) do you have weekly: ---   How many missed doses do you have a week: ---  How many times do you check your blood glucose per day: ---  Any Barriers to therapy: cost of medications/comfortable with giving injections (if applicable)/ comfortable and confident with current diabetes regimen ---      Prior to Admission medications    Medication Sig Last Dose Taking? Auth Provider   ALBUTEROL 108 (90 BASE) MCG/ACT inhaler INHALE 2 PUFF BY INHALATION ROUTE EVERY 4 - 6 HOURS AS NEEDED 2/5/2019 at am Yes Reported, Patient   ARNUITY ELLIPTA 100 MCG/ACT AEPB inhalation powder Take 100 mcg by mouth daily Past Week at Unknown time Yes Reported, Patient   ASPIRIN 81 MG OR TABS 1 tab po QD (Once per day) 2/4/2019 at Unknown time Yes    AZOPT 1 % OP SUSP 1 DROP BOTH EYES BID (am and hs) 2/5/2019 at am Yes Liz Chapin MD   EPINEPHrine (EPIPEN 2-LALA) 0.3 MG/0.3ML injection Inject 0.3 mLs (0.3 mg) into the muscle once as needed for anaphylaxis   Yes Liz Chapin MD   furosemide (LASIX) 20 MG tablet Take 1 tablet (20 mg) by mouth 2 times daily With breakfast and lunch 2/4/2019 at Unknown time Yes Liz Chapin MD   hydrocortisone (WESTCORT) 0.2 % cream Apply sparingly to affected area as needed up to twice daily  Yes Liz Chapin MD   LYRICA 75 MG capsule Take 150 mg by mouth nightly as needed   Yes Reported, Patient   metFORMIN (GLUCOPHAGE-XR) 500 MG 24 hr tablet Take 1 tablet (500 mg) by mouth daily (with dinner) 2/4/2019 at Unknown time Yes Liz Chapin MD   metoprolol tartrate (LOPRESSOR) 25 MG tablet Take 1 tablet (25 mg) by mouth At Bedtime Past Week at Unknown time Yes Liz Chapin MD   omeprazole (PRILOSEC) 20 MG CR capsule Take 20 mg by mouth daily 2/5/2019 at Unknown time Yes Reported, Patient   potassium chloride ER (KLOR-CON) 10 MEQ CR tablet Take 1 tablet (10 mEq) by mouth 2 times daily With breakfast and lunch 2/4/2019 at Unknown time Yes Liz Chapin MD   rOPINIRole (REQUIP) 0.25 MG tablet Take 0.25 mg by mouth every 24 hours Daily at 4pm 2/4/2019 at 1600 Yes Reported, Patient   rOPINIRole (REQUIP) 0.5 MG tablet Take 0.5 mg by mouth At Bedtime 2/4/2019 at Unknown time Yes Unknown, Entered By History   simvastatin (ZOCOR) 20 MG tablet Take 1 tablet (20 mg) by mouth At Bedtime 2/4/2019 at Unknown time Yes Liz Chapin MD   spironolactone (ALDACTONE) 50 MG tablet Take 50 mg by mouth daily  Past Week at Unknown time Yes Reported, Patient   XALATAN 0.005 % OP SOLN 1 drop  bot eyes at hs 2/4/2019 at Unknown time Yes Liz Chapin MD   ACE/ARB/ARNI NOT PRESCRIBED, INTENTIONAL, Please choose reason not prescribed, below   Liz Chapin MD

## 2019-02-06 NOTE — PROGRESS NOTES
"SPIRITUAL [facility] Progress Note  Critical access hospital Ortho 6    Pt alert, pleasant and conversant. Dtr, Emili at the bedside. Pt is 84 with history of Pulmonary Fibrosis, admitted for diabetes related wound on foot. Denied pain.    Pt engaged in life review, sharing stories of childhood, marriage and spirituality.  Raised in the [facility], Evy enjoyed talking about her [employer] community at this time, in [place], which integrates her  spirituality interests with her [employer] and is \"very inclusive\".    Prayer requested and provided for healing. Introduced patient to Spiritual Health Services.     Plan: Spiritual Health Services remains available for additional emotional/spiritual support.    Miguel Angel Alvarado MA  Staff   Pager: [phone]  Phone: [phone]        "

## 2019-02-06 NOTE — H&P
Admitted:     02/05/2019      CHIEF COMPLAINT:  Weakness, diarrhea, left foot redness, and wound, decreased oral intake, among other complaints.      HISTORY OF PRESENT ILLNESS:  Ms. Song is an 84-year-old female with a history of nonalcoholic steatohepatitis with resultant cirrhosis, obstructive sleep apnea, pulmonary fibrosis on chronic oxygen, diabetes mellitus type 2, on oral medications, who presents to the hospital today for the above concerns.  When asked, the patient why she is here, she listed several issues.  Her primary concern is a left foot wound that has developed over the past week.  It is a wound on the dorsum of her left foot.  It started out as a small blister.  She states that she put some ice on it and she thinks she made it worse.  The blister ruptured.  It now has some surrounding redness and worsening swelling.  She has had no fevers or chills.  There has been some clear drainage from the wound, but no purulence.  She also is concerned about lower extremity edema which sounds more like a chronic process for her.  She has also had decreased oral intake with food not tasting very good.  She also reports a dry mouth.  She also reports ongoing loose stools and diarrhea which primarily occur after eating.  She also has weakness of the bilateral lower extremities which is more of a chronic process as well.      On arrival to the ER this evening, vital signs included blood pressure 114/53 with a heart rate of 80.  Afebrile, saturation 93% on room air.  Workup in the ER included labs and imaging.  White cell count was 22,000.  CRP is 90.  Sed rate is normal.  Creatinine is 1.14.  Albumin is 2.4.  Lactic acid initially was 5, reduced to 4.6 on redraw.  Blood cultures obtained and pending.  Urinalysis shows hyaline casts, but no evidence of infection.  Imaging included abdominal pelvic CT scan showing cirrhotic-appearing liver with moderate amount of free intraperitoneal fluid.  Foot x-ray was done  showing no evidence of fracture and no air in the soft tissues.      The patient was given antibiotics in the ER including vancomycin and ceftriaxone.  I spoke to the ER provider and plan is for admission to the Hospitalist Service.      PAST MEDICAL HISTORY:   1.  History of nonalcoholic steatohepatitis and resultant cirrhosis.   2.  Portal hypertension.   3.  Sleep apnea.  Does not use CPAP.   4.  Pulmonary fibrosis on chronic home oxygen.     5.  Diabetes mellitus type 2, on oral medication   6.  Cholecystectomy.   7.  Appendectomy.   8.  Lower extremity edema.      CURRENT MEDICATIONS:   1.  Albuterol.   2.  Ellipta inhaler.   3.  Aspirin.   4.  Eyedrops.   5.  Lasix 20 mg twice a day.   6.  Lyrica 75 mg nightly as needed.   7.  Metformin 500 mg daily.   8.  Metoprolol 25 mg at bedtime.   9.  Omeprazole 20 mg daily.   10.  Potassium chloride 10 mEq twice a day.   11.  Requip 0.5 mg at bedtime.   12.  Simvastatin 20 mg at bedtime.   13.  Spironolactone 50 mg daily.   14.  Xalatan eyedrops.      ALLERGIES:   1.  MONOSODIUM GLUTAMATE.   2.  ATENOLOL.   3.  BEE VENOM.   4.  BENADRYL.   5.  CELEBREX.      FAMILY HISTORY:  Reviewed.  Nothing contributory to this admission.      SOCIAL HISTORY:  The patient is a lifelong nonsmoker and nondrinker.      REVIEW OF SYSTEMS:  See HPI for details.  The patient denies any history of ascites requiring paracentesis.  She also denies any history of encephalopathy.  Both she and her daughter feel that her mental status is currently close to its normal baseline.      Comprehensive greater than 10-point review of systems otherwise negative besides that detailed above.      PHYSICAL EXAMINATION:   VITAL SIGNS:  Blood pressure is currently 118/59 with heart rate of 92.  Afebrile, saturation 95% on room air.   GENERAL:  The patient appears nontoxic and in no acute distress.  She appears awake and alert.  She is a bit slow to answer questions and seems to have at least a mild amount of  confusion present.  Her daughter is assisting with some of the history she is providing.   HEENT:  Head is atraumatic.  Sclerae are white.  Eyelids are normal.  Conjunctivae are normal.  Extraocular movements are intact.   NECK:  Supple.  No cervical or supraclavicular lymphadenopathy.   HEART:  Regular rate and rhythm.  No significant murmurs.  She has 2+ lower extremity edema bilaterally.   LUNGS:  Clear to auscultation bilaterally.  No intercostal retractions.  No conversational dyspnea.   ABDOMEN:  Nontender, nondistended.  Soft.  No masses.  No organomegaly.   EXTREMITIES:  Show edema as above.   SKIN:  Reveals no rash.  No jaundice.  Skin is dry to touch.  Examination of the dorsum of her left foot reveals a shallow ulceration of her left foot with surrounding redness.  It is approximately a half dollar size area.  I outlined it in the ER.   NEUROLOGIC:  Cranial nerves II through XII are intact.  Moves all extremities appropriately.  Sensation intact to light touch in upper and lower extremities bilaterally.   PSYCHIATRIC:  Awake and alert as above.  I do suspect there is some confusion present especially with trying to provide history for me.      LABORATORY AND IMAGING DATA:  Reviewed above in HPI.      IMPRESSION AND PLAN:  Ms. Song is an 84-year-old female with a history of nonalcoholic cirrhosis, pulmonary fibrosis on chronic oxygen, diabetes mellitus, on oral medication, who presents to the hospital today for a variety of concerns.  Her primary complaint is a left foot wound that has developed over the past week.  However, she also has a number of other complaints as above including loose stools, generalized weakness, and decreased oral intake.  Her recent history is notable for abdominal pain that she had about a week ago.  The symptoms resolve, but she did not seek any evaluation for this.      On exam, she appears to have some degree of confusion.        1.  Mild cellulitis of the left foot.   2.   Shallow ulceration dorsum of the left foot.   3.  Significant leukocytosis.  I would be somewhat surprised if her leukocytosis is entirely related to the lesion of the left foot.  I would expect more of a cellulitis present if that were the cause of her leukocytosis.  I think she requires further workup for elevated white blood cell count.   4.  Confusion.  Although patient and daughter say her mental status is baseline, she does appear to show some evidence of confusion and possible hepatic encephalopathy.   5.  Recent abdominal pain.  One week ago, now resolved.  She had similar pain related to diverticulitis in the past.  Abdominal pelvic CT scan shows no acute findings to explain her symptoms.   6.  Ascites on abdominal CT scan.   7.  Elevated lactic acid, likely in part related to poor clearance from cirrhosis.   8.  Ongoing loose stools.  Last antibiotic use was 6 months ago for UTI.   9.  Generalized weakness and deconditioning.   10.  Poor oral intake with hypoalbuminemia and malnutrition.      PLAN:   1.  We will admit to inpatient status.   2.  Check Clostridium difficile in the setting of diarrhea and leukocytosis.   3.  Cover her left foot cellulitis with IV Ancef.   4.  Check peripheral smear to rule out other causes for leukocytosis.   5.  Given her recent abdominal pain and evidence of ascites, I am going to rule out subacute bacterial peritonitis with ultrasound-guided paracentesis.   6.  Check ammonia level to help rule out hepatic encephalopathy.   7.  IV fluid hydration overnight.   8.  No respiratory symptoms, so will hold off on chest x-ray, as pneumonia seems very unlikely here.   9.  Follow white blood cell count.   10.  PT, OT consultations.         RHODA AMANDA MD             D: 2019   T: 2019   MT: RAMBO      Name:     KIRILL DE LA PAZ   MRN:      [id number]        Account:      [id number]   :      1934        Admitted:     2019                   Document:  [id number]

## 2019-02-06 NOTE — ED NOTES
Pt hypotensive once returned from CT so pressure bag 2L MD DOMENICO in room, additional IV placed by another RN, pts pressures increasing to 90s-100s systolic. Pt asymptomatic at this time, requesting water, brought a small amount of ice chips.

## 2019-02-06 NOTE — ED NOTES
Pt is still outside CT room at this time, went down to CT to ensure that pt receives scan. CT staff aware that pt is waiting for scan, pt was bumped out of line for two consecutive code strokes.

## 2019-02-06 NOTE — PROGRESS NOTES
[facility]  Hospitalist Progress Note  Idania Hansen MD 02/06/2019    Reason for Stay (Diagnosis): Generalized weakness, diarrhea, left foot erythema         Assessment and Plan:      Summary of Stay: Evy Song is a 84 year old female with a history of URBAN and associated cirrhosis, mild REY and no current need for CPAP, pulmonary fibrosis, hypertension, type 2 diabetes, and supraventricular tachycardia admitted on 2/5/2019 with multiple complaints including generalized weakness, poor p.o. intake, diarrhea, increased LE swelling, and left foot erythema.    Evaluation is notable for leukocytosis without fever, significantly elevated lactate at 4.6, suspected left lower extremity cellulitis, CT scan showing a moderate amount of ascites with a cirrhotic liver and prominent gastroesophageal varices.    She has been initiated on empiric antibiotics in the form of cefazolin and has had a significant improvement in her white count  Problem List:   1. Left foot cellulitis: near area of recent trauma, definite erythema but minimal warmth, not impressive from an infectious standpoint, will treat with limited abx regimen-cont cefazolin for now, ask WOC input regarding recent wound  2. Bilateral LE edema:  Likely due to noncompliance with furosemide (she only takes it intermittently as it interferes with her ability to participate in activities).  Suspect she also has bowel wall edema that makes oral furosemide not effective.  Furosemide 20 mg IV q 4 hours x 2, watch renal function closely   3. Leukocytosis: Rather prominent in the setting of an unimpressive cellulitis: improved after abx.  She has no abdominal pain to suggest SBP, will not pursue US guided paracentesis at this time-recheck in am   4. Lactic acidosis: I suspect this is inadequate cell perfusion in the setting of dehydration from diarrhea and I think it is quite elevated because of her chronic liver failure. There is no evidence of sepsis.  I see  no culprit medications, improved to 3.0.  Recheck in am  5. Diarrhea: No evidence of colitis on CT scanning, enteric panel and C. difficile been ordered-specimen collected and results pending.  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  Provided stool sample negative will order some sched imodium.   6. 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  There is no evidence of hepatic encephalopathy and ammonia is within normal limits.  Lactulose would not not be beneficial in thi setting especially due to diarrhea.  Cont baseline spironolactone.   7. Obstructive sleep apnea:apparently had re-evaluation and was told she does not require CPAP  8. Pulmonary fibrosis: without acute exacerbation   DVT Prophylaxis: Pneumatic Compression Devices  Code Status: Full Code  Functional:  Lives independently in own apt-has walker-uses it 75 % of the time, feels very weak so will ask for PT input    Disposition Plan   Expected discharge in 3-4 days to tbd once diuresis/diarrhea/weakness addressed   .     Entered: Idania Hansen 02/06/2019, 9:01 AM     Discussed in depth with her daughter and herself at the bedside.      Interval History (Subjective):      Feeling about the same from admission, she denies any abdominal pain.  She has not had any fevers chills or sweats.  P.o. intake is fair at best.  Still feeling weak and continues to have frequent episodes of diarrhea.  She states anytime she eats something it goes right through her.  This is been going on since at least December and probably as far back as Thanksgiving.  She states she did have a URI before Thanksgiving but does not recall having a stomach flu following it.  No new infectious contacts.  Not been on  "antibiotics recently.  She denies any chest pain or shortness of breath but has felt globally weak and at risk for falling.                  Physical Exam:      Last Vital Signs:  /47 (BP Location: Right arm)   Pulse 66   Temp 96.6  F (35.9  C) (Oral)   Resp 18   Ht 1.626 m (5' 4\")   Wt 88.3 kg (194 lb 9.6 oz)   LMP  (LMP Unknown)   SpO2 96%   BMI 33.40 kg/m      I/O: Net 1 L although not clear that I know it is accurate    Pleasant no acute distress looks stated age head is normocephalic atraumatic and sclera clear lungs are actually clear to auscultation she exhibits normal respiratory effort heart is of a regular rate and rhythm without murmurs rubs or gallops abdominal exam is soft nontender nondistended skin is warm dry there is no cyanosis or clubbing of the extremities.  Lower extremity she has diffuse 2-3+ soft pitting lower extremity edema left foot she is got an area of an abrasion in between the second and third digit on the dorsal side.  There is some surrounding erythema there is no discharge.  There is minimal warmth and minimal tenderness (although this is in the setting of a peripheral neuropathy)           Medications:      All current medications were reviewed with changes reflected in problem list.         Data:      All new lab and imaging data was reviewed.   Labs:  Recent Labs   Lab 02/06/19  0803      POTASSIUM 3.6   CHLORIDE 107   CO2 24   ANIONGAP 10   *   BUN 32*   CR 1.10*   GFRESTIMATED 46*   GFRESTBLACK 53*   MEGHAN 8.0*     Recent Labs   Lab 02/06/19  0803   WBC 13.7*   HGB 10.3*   HCT 32.0*   MCV 88   *     Recent Labs   Lab 02/06/19  1317 02/06/19  0826 02/06/19  0803 02/06/19  0312 02/06/19  0043 02/05/19  1737   GLC  --   --  103*  --   --  117*   * 93  --  107* 100*  --       Imaging:       "

## 2019-02-06 NOTE — PROGRESS NOTES
[facility]  Patient is currently open to home care services with [facility] as of 2/5/19.  The patient is currently receiving SN services.  [facility]  and team have been notified of patient admission.  [facility] liaison will continue to follow patient during stay.  If appropriate provide orders to resume home care at time of discharge.

## 2019-02-07 NOTE — PLAN OF CARE
Discharge Planner PT   Patient plan for discharge: home vs TCU, pending progress  Current status: Pt continues to require supplemental O2 with SaO2 dropping to 80% with ambulation and OOB activity. Amb 60 ft x 2 with FWW and close SBA/CGA with 3-4 L of O2, monitoring SaO2 closely. Required several minutes of seated rest break in between walking trials for SaO2 to recover to 90-92%. CGA/SBA for functional transfers.  Barriers to return to prior living situation: limited activity tolerance, unstable SaO2 with activity requiring several minutes to recover, wound on dorsum of L foot, lives alone  Recommendations for discharge: TCU  Rationale for recommendations: Currently functioning below baseline, will need ongoing skilled PT intervention to improve independence and safety with functional mobility skills prior to returning home.       Entered by: Kim Donovan 02/07/2019 11:50 AM

## 2019-02-07 NOTE — PLAN OF CARE
VSS. A/O x 4. Forgetful at times.   Up w/ast x 1 and walker.   Pt bladder scanned for 545. Voided 50 ml.  Straight cathed for 200. . Pt has hx of ascites on abdomin.   LS: diminished  Loose stool this morning.   Denies pain.   Mod cho diet.

## 2019-02-07 NOTE — PLAN OF CARE
Alert and oriented x 4,forgetful at times.  Vital signs stable.  Lungs clear, diminished in bases, dyspnea with activity, needs O2 at 2 lpm nasal cannula.  Bowel sounds hypoactive, voiding.  Transfers with walker, gait belt and sba of 1.  Denies pain.Edema to bilateral extremities, dressing to left foot  intact.lower extremities elevated on pillows, pt reported  baseline in feet.neuropathy.  On IV ancef,Blood glucose monitoring.  Plan of care reviewed with pt and daughter.

## 2019-02-07 NOTE — PROGRESS NOTES
.Discharge Planner   Discharge Plans in progress: home vs TCU  Barriers to discharge plan: undetermined  Follow up plan: Awaiting pt determination of her agreement for TCU, if so, facility preferences.        Entered by: RUPESH Fall 02/07/2019 3:45 PM

## 2019-02-07 NOTE — CONSULTS
"CLINICAL NUTRITION SERVICES  -  ASSESSMENT NOTE    Malnutrition:   % Weight Loss:  Weight loss does not meet criteria for malnutrition - none indicated   % Intake:  </= 75% for >/= 1 month (severe malnutrition)  Subcutaneous Fat Loss:  Orbital region mild depletion and Upper arm region moderate depletion  Muscle Loss:  Temporal region mild depletion, Clavicle bone region mild-moderate depletion and Dorsal hand region mild depletion  Fluid Retention:  Moderate 3+    Malnutrition Diagnosis: Non-Severe malnutrition  In Context of:  Acute illness or injury  Chronic illness or disease     REASON FOR ASSESSMENT  Evy Song is a 84 year old female seen by Registered Dietitian for Admission Nutrition Risk Screen - unintentional loss of 10 lbs or more in the past 2 months (20 lbs) and RN Consult - \"Pt reports weightloss of 20 lbs in last 2 months, reports decreased appetite.\"    NUTRITION HISTORY  - Information obtained from patient, daughter Emili at bedside.   - Diabetes type2 (on oral meds) complicated by peripheral neuropathy, Cirrhosis, portal hypertension.   - Loose stools/diarrhea after eating ongoing  - Olive also notes taste loss, and overall lack of appetite. Admits that some days she'll go for hours before realizing that she hadn't eaten yet that day. She has struggled with these problems for the past 4-6 months.   - Notes she \"snacks\" more often than having a full meal, given that she lives alone and does not prepare meals often.   - Eats avelina crackers with milk, loves sweets. She occasionally will make a pork roast, or have a steak. Likes sausage, can do w/o allison. She likes fish (aside from salmon) but does not like to cook it.   - Has been avoiding chicken and eggs lately as she has gotten sick of them  - if she has chicken noodle soup she will pick out the chicken pieces (has always thought of them as poor meat quality).   - Likes oatmeal with brown sugar and butter   - Likes fruit   - After her " "  she began eating at restaurants more frequently - [employer], [employer], etc.   - tried Ensure in the past, did not tolerate well.   - Tries to follow a diabetic diet, and watches her sodium intake but has a big sweet tooth. She verbalizes knowing that her diet is not very good.   - Checks BGs 5x weekly, usually in the morning before breakfast.     - MSG allergy.     CURRENT NUTRITION ORDERS  Diet Order:     Moderate Consistent Carbohydrate + Boost Shake w/ Meals     Current Intake/Tolerance:  50% intakes recorded this admission. Diarrhea is beginning to slow with imodium dosing. First dose given today. Last night was up to the bathroom 5 times.     PHYSICAL FINDINGS  Observed  Muscle Wasting - see below  Subcutaneous fat loss - see below   Obtained from Chart/Interdisciplinary Team  WOCN : Left dorsal foot Suspect  Pt has PVD; Severe edema, does not wear compression hose. Developed a blister that is now unroofed.  LE Edema    ANTHROPOMETRICS  Height: 5' 4\"  Weight: 83 kg - admit weight   Body mass index is 31.4 kg/m .  Weight Status:  Obesity Grade I BMI 30-34.9  IBW: 54.5 kg  % IBW: 153%  Weight History: 183# lowest of this admission. Weight appears fairly stable.  Wt Readings from Last 10 Encounters:   19 88.3 kg (194 lb 9.6 oz)   19 82.1 kg (181 lb)   18 76.7 kg (169 lb 3.2 oz)   18 81.6 kg (180 lb)   18 82.4 kg (181 lb 9.6 oz)   18 81.6 kg (180 lb)   18 82.3 kg (181 lb 6.4 oz)   17 88.9 kg (196 lb)   17 84.8 kg (187 lb)   17 87.4 kg (192 lb 9.6 oz)       LABS  Labs reviewed  Recent Labs   Lab Test 19  0651 19  0803 19  1737 19  1511 19  0948   POTASSIUM 3.3* 3.6 3.9 2.9* 3.3*     No results for input(s): PHOS in the last 62865 hours.  No results for input(s): MAG in the last 91650 hours.  Recent Labs   Lab Test 19  0651 19  0803 19  1737 19  1511 19  0948    141 138 141 " 141     Recent Labs   Lab Test 02/07/19  0651 02/06/19  0803 02/05/19  1737 01/28/19  1511 01/25/19  0948   CR 1.14* 1.10* 1.14* 1.25* 0.76     Recent Labs   Lab 02/07/19  0651 02/06/19  0803 02/05/19  1737   * 103* 117*     Lab Results   Component Value Date    A1C 5.7 02/05/2019    A1C 5.5 01/28/2019    A1C 5.8 08/13/2018    A1C 6.4 02/21/2018    A1C 6.4 01/23/2018         MEDICATIONS  Medications reviewed    ASSESSED NUTRITION NEEDS PER APPROVED PRACTICE GUIDELINES:  Dosing Weight 61.6 kg  Estimated Energy Needs: 0490-5578 kcals (25-30 Kcal/Kg)  Justification: maintenance  Estimated Protein Needs: 74-92 grams protein (1.2-1.5 g pro/Kg)  Justification: preservation of lean body mass, wound healing  Estimated Fluid Needs: >1 mL/kcal  Justification: maintenance    MALNUTRITION:  % Weight Loss:  Weight loss does not meet criteria for malnutrition - none indicated   % Intake:  </= 75% for >/= 1 month (severe malnutrition)  Subcutaneous Fat Loss:  Orbital region mild depletion and Upper arm region moderate depletion  Muscle Loss:  Temporal region mild depletion, Clavicle bone region mild-moderate depletion and Dorsal hand region mild depletion  Fluid Retention:  Moderate 3+    Malnutrition Diagnosis: Non-Severe malnutrition  In Context of:  Acute illness or injury  Chronic illness or disease    NUTRITION DIAGNOSIS:  Inadequate oral intake related to baseline taste loss and poor appetite with ongoing diarrhea as evidenced by verbalized intakes likely meeting <75% estimated needs for the past 4-6 months, e/o fat and muscle loss, coding for non-severe malnutrition.       NUTRITION INTERVENTIONS  Recommendations / Nutrition Prescription  Continue diet per MD - Mod CHO    Protein oral supplements w/ meals    Thera vit M daily       Implementation  Nutrition education: Provided education on tips for increasing eating frequency, increasing protein intakes, how to prepare more nutrient dense snacks.     Assessed  "learning needs, learning preferences, and willingness to learn    Nutrition Education (Content):  a) Provided handout   a. type 2 diabetes nutrition therapy  b. Tips for adding protein  c. Suggestions for adding protein and calories   d. Protein for injury healing  b) Discussed   a. Sources of protein  b. Snacks with at least 2 food groups, each to contain a good protein source  c. Eating frequent small meals/snacks  d. Tips for adding eggs back into diet    Nutrition Education (Application):  a) Discussed eating habits and recommended alternative food choices    Patient verbalizes understanding of diet    Anticipate fair - good compliance \"I can only improve from here\"     Diet Education - refer to Education Flowsheet      Nutrition Goals  Pt to tolerate at least 50% meals TID + 1-2 supplements daily.       MONITORING AND EVALUATION:  Progress towards goals will be monitored and evaluated per protocol and Practice Guidelines    Anita Vásquez RD, LD  3rd floor/ICU: [phone]  All other floors: [phone]  Weekend/holiday: [phone]  Office: [phone]            "

## 2019-02-07 NOTE — PLAN OF CARE
A&Ox4 ex forgetful. VSS. A1 with gb and walker. Tolerating diet ok but poor appetite. Took imodium prn for diarrhea which was effective. Denies pain. Edema BLE +3. Dressing to foot has scant dried drainage. Wound care every other day, to be done tomorrow. Replaced potassium. Lactic 2.6, updated MD. Unsure of discharge plans but likely TCU.

## 2019-02-07 NOTE — PROGRESS NOTES
[facility]  Hospitalist Progress Note  Idania Hansen MD 02/07/2019    Reason for Stay (Diagnosis): Generalized weakness, diarrhea, left foot erythema         Assessment and Plan:      Summary of Stay: Evy Song is a 84 year old female with a history of URBAN and associated cirrhosis, mild REY and no current need for CPAP, pulmonary fibrosis, hypertension, type 2 diabetes, and supraventricular tachycardia admitted on 2/5/2019 with multiple complaints including generalized weakness, poor p.o. intake, diarrhea, increased LE swelling, and left foot erythema.    Evaluation is notable for leukocytosis without fever, significantly elevated lactate at 4.6, suspected left lower extremity cellulitis, CT scan showing a moderate amount of ascites with a cirrhotic liver and prominent gastroesophageal varices.    She has been initiated on empiric antibiotics in the form of cefazolin and has had a significant improvement in her white count.  She is receiving IV furosemide for prominent bilateral le edema in setting of diuretic noncompliance   Problem List:   1. Left foot cellulitis: near area of recent trauma, definite erythema but minimal warmth, not impressive from an infectious standpoint, will treat with limited abx regimen-cont cefazolin for now, ask WOC input regarding recent wound  2. Bilateral LE edema:  Likely due to noncompliance with furosemide (she only takes it intermittently as it interferes with her ability to participate in activities).  Suspect she also has bowel wall edema that makes oral furosemide not effective. IV furosemide, had ok response to 20 mg IV q4 hours x 2 yesterday with about 1 L out, tolerated from renal standpoint, will increase to 40 mg IV q4 x 2 today, cont with strict I/O, weights, recheck bmp in am   3. Leukocytosis: Rather prominent in the setting of an unimpressive cellulitis: improved after abx.  She has no abdominal pain to suggest SBP, will not pursue US guided paracentesis  at this time-recheck and now wnl   4. Lactic acidosis: I suspect this is inadequate cell perfusion in the setting of dehydration from diarrhea and I think it is quite elevated because of her chronic liver failure. There is no evidence of sepsis.  I see no culprit medications, improved to 3.0.  Recheck in am  5. Diarrhea: No evidence of colitis on CT scanning, enteric panel and C. difficile been ordered-specimen collected and results pending.  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  Of note c diff negative, enteric panel not ordered.  Will order prn imodium  6. 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  There is no evidence of hepatic encephalopathy and ammonia is within normal limits.  Lactulose would not not be beneficial in thi setting especially due to diarrhea.  Cont baseline spironolactone.   7. Memory impairment, she's clearly repetitive during my interviews and per dtr this is her baseline, no e/o delirium.    8. Obstructive sleep apnea:apparently had re-evaluation and was told she does not require CPAP  9. Pulmonary fibrosis: without acute exacerbation   10. RLS:  Worse due to hypokalemia, replace with protocol, check mag and replace if necessary   DVT Prophylaxis: Pneumatic Compression Devices  Code Status: Full Code  Functional:  Lives independently in own apt-has walker-uses it 75 % of the time, feels very weak so will ask for PT input    Disposition Plan   Expected discharge in 2-3 days to tbd once diuresis/diarrhea/weakness addressed   .     Entered: Idania Hansen 02/07/2019, 9:01 AM     Discussed in depth with her daughter and herself at the bedside.      Interval History (Subjective):      Had worsening diarrhea overnight, and  "rec'd periodic straight caths for possible urinary retention although bladder scan is inaccurate in setting of ascites and so has not had significant retention by straight cath.  Po intake is fair, le edema about the same, terrible rls overnight and thinks peripheral neuropathy is worse too.                   Physical Exam:      Last Vital Signs:  /48 (BP Location: Left arm)   Pulse 63   Temp 96.2  F (35.7  C) (Oral)   Resp 16   Ht 1.626 m (5' 4\")   Wt 88.3 kg (194 lb 9.6 oz)   LMP  (LMP Unknown)   SpO2 91%   BMI 33.40 kg/m      I/O: multiple unmeasured UO, st cath without e/o retention   Weights-not done for today, will recheck in am     Pleasant no acute distress looks stated age head is normocephalic atraumatic and sclera clear lungs are actually clear to auscultation she exhibits normal respiratory effort heart is of a regular rate and rhythm without murmurs rubs or gallops abdominal exam is soft nontender nondistended skin is warm dry there is no cyanosis or clubbing of the extremities.  Lower extremity she has diffuse 2-3+ soft pitting lower extremity edema left foot she is got an area of an abrasion in between the second and third digit on the dorsal side.  There is some surrounding erythema there is no discharge.  There is minimal warmth and minimal tenderness (although this is in the setting of a peripheral neuropathy)           Medications:      All current medications were reviewed with changes reflected in problem list.         Data:      All new lab and imaging data was reviewed.   Labs:  Recent Labs   Lab 02/07/19  0651      POTASSIUM 3.3*   CHLORIDE 108   CO2 23   ANIONGAP 9   *   BUN 35*   CR 1.14*   GFRESTIMATED 44*   GFRESTBLACK 51*   MEGHAN 8.2*     Recent Labs   Lab 02/07/19  0651   WBC 10.5   HGB 11.0*   HCT 34.4*   MCV 88        Recent Labs   Lab 02/07/19  0651 02/07/19  0156 02/06/19  2110 02/06/19  1753 02/06/19  1317 02/06/19  0826 02/06/19  0803  " 02/05/19  1737   *  --   --   --   --   --  103*  --  117*   BGM  --  145* 129* 158* 106* 93  --    < >  --     < > = values in this interval not displayed.      Imaging:

## 2019-02-07 NOTE — PLAN OF CARE
"/65 (BP Location: Right arm)   Pulse 66   Temp 97.2  F (36.2  C) (Axillary)   Resp 18   Ht 1.626 m (5' 4\")   Wt 88.3 kg (194 lb 9.6 oz)   LMP  (LMP Unknown)   SpO2 91%   BMI 33.40 kg/m    Neuro: A&Ox4, some forgetfulness  Pain: denies pain  Resp: LS diminished, tolerated RA at rest, requires 2L O2 with activity  Cardiac: WDL  GI/: Frequent diarrhea after meals, hesitancy, difficulty voiding, BS inaccurate d/t ascites, cathed at 12 for 300, voided at 4pm in toilet  Diet: poor intake, Pt hesitant to eat d/t episodes of diarrhea shortly after, nutrition consulted  Skin/mobility: blister to left foot, wound care done, WOC following, 3+ edema to BLE, up with Ax1 and walker  Tx/plan:  IV lasix x2, CBC and BMP in morning, Ancef for potential cellulitis, encourage to void, cath if needed, PT following, stool negative for c-diff  Will continue to monitor and provide supportive care.      "

## 2019-02-07 NOTE — CONSULTS
.Care Transition Initial Assessment - DAMIEN  Reason For Consult: discharge planning. Chart reviewed, noted PT assessment with recommendation at this time for pt's transfer to rehab facility. Noted MD plan of care with expected discharge in 3-4 days, involvement of nutrition, and  WOC RN.   Met with: Patient and Family    Active Problems:    Cellulitis       DATA  Lives With: alone   Living Arrangements: house  Quality of Family Relationships: involved, supportive  Description of Support System: Supportive, Involved  Who is your support system?: Children  Support Assessment: Adequate family and caregiver support.  Per review of medical record it is noted that pt is currently receiving home RN services through [facility].    Quality of Family Relationships: involved, supportive  Transportation Anticipated: (to be determined)    INTERVENTION   Met with pt, daughter, Emili also present in room. Discussed with them the current PT recommendation for her transfer to rehab facility on discharge which pt acknowledged consideration of...she also would like to keep in consideration the option of discharge to home depending on her progress. Addressed questions regarding extended care home care services, noting that this would be on a private pay basis.     DAMIEN has left message with [facility] liasion, Rubia asking that she contact daughter to discuss opportunities of extended care services, costs etc, daughter also provided additional resource for available agency inquiry for extended care services.     Provided SNF listing which was reviewed with them , and addressed questions.. pt did not wish at this time to identify facilities for consideration, DAMIEN contact number was provided for follow-up. It was noted that referrals could be made on her behalf especially if she had preference of facility, and that referrals made to not obligate her to the TCU plan.     ASSESSMENT  Cognitive Status:  Alert, oriented     PLAN    Patient given  options and choices for discharge: Yes  Patient Goals and Preferences: independence with ambulation  and ADLs  Patient anticipates discharging to:  To be determined    SW will continue to follow, await pt/daughter determination of pt's transfer to TCU vs home with home care services/extended care services pending progress.

## 2019-02-08 NOTE — PROGRESS NOTES
Patient A&Ox4, forgetful.  VSS. Ambulates Ax1 w/ WW & GB. +3 edema to BLE. +BS/gas, patient reports lack of appetite. Voiding in adequate amounts. Wound care done per plan of care today, next dressing change due on 2/10/18. Taking PRN tylenol for pain control. Possible discharge to home on Monday. Will continue to monitor.

## 2019-02-08 NOTE — PLAN OF CARE
Discharge Planner PT   Patient plan for discharge: home but open to TCU if needed she would like to hire help for at home vs TCU  Current status: 1 assist for mobility with RW limited by foot pain to less than household distances  Barriers to return to prior living situation: limited activity tolerance, unstable SaO2 with activity requiring several minutes to recover, wound on dorsum of L foot, lives alone  Recommendations for discharge: TCU per plan established by the PT.    Rationale for recommendations: Currently functioning below baseline, will need ongoing skilled PT intervention to improve independence and safety with functional mobility skills prior to returning home.           Entered by: Sangeeta Márquez 02/08/2019 10:52 AM

## 2019-02-08 NOTE — TELEPHONE ENCOUNTER
Pending Prescriptions:                       Disp   Refills    metFORMIN (GLUCOPHAGE-XR) 500 MG 24 hr ta*360 ta*             Sig: Take 4 tablets (2,000 mg) by mouth daily (with           dinner)    Pt has an appt on 2-11  Last refill 1-2019 in historical  Please fax change nancy or antonette Reveles  [phone] (home) NONE (work)

## 2019-02-08 NOTE — PROGRESS NOTES
[facility]  Hospitalist Progress Note  Idania Hansen MD 02/08/2019    Reason for Stay (Diagnosis): Generalized weakness, diarrhea, left foot erythema         Assessment and Plan:      Summary of Stay: Evy Song is a 84 year old female with a history of URBAN and associated cirrhosis, mild REY and no current need for CPAP, pulmonary fibrosis, hypertension, type 2 diabetes, and supraventricular tachycardia admitted on 2/5/2019 with multiple complaints including generalized weakness, poor p.o. intake, diarrhea, increased LE swelling, and left foot erythema.    Evaluation is notable for leukocytosis without fever, significantly elevated lactate at 4.6, suspected left lower foot cellulitis, CT scan showing a moderate amount of ascites with a cirrhotic liver and prominent gastroesophageal varices.    She has been initiated on empiric antibiotics in the form of cefazolin and has had a significant improvement in her white count.  She is receiving IV furosemide for prominent bilateral le edema in setting of diuretic noncompliance.    Has been sob mostly with exertion but noted to be hypoxic throughout hospitalization although she thinks her breathing is improving.     Diarrhea much improved after initiation of imodium.  So had decent sleep last night     Problem List:   1. Left foot cellulitis: near area of recent trauma, definite erythema but minimal warmth, not impressive from an infectious standpoint, will treat with limited abx regimen-cont cefazolin for now day 3/5, appreciate WOC input  2. Bilateral LE edema:  Likely due to noncompliance with furosemide (she only takes it intermittently as it interferes with her ability to participate in activities) as well as liver dz.  Suspect she also has bowel wall edema that makes oral furosemide not effective. Tolerating IV furosemide from a renal standpoint although UO not impressive weights not completed. Cont with furosemide 40 mg q6 x 2 today and reassess in  am.   3. Leukocytosis: Rather prominent in the setting of an unimpressive cellulitis: improved after abx.  She has no abdominal pain to suggest SBP, did not pursue US guided paracentesis at this time-rechecked and now wnl   4. Lactic acidosis: I suspect this is inadequate cell perfusion in the setting of dehydration from diarrhea and I think it is quite elevated because of her chronic liver failure. There is no evidence of sepsis.  I see no culprit medications, improved to 2.6-will recheck in am   5. Diarrhea: No evidence of colitis on CT scanning, enteric panel and C. difficile been ordered-specimen collected and results pending.  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  Of note c diff negative, enteric panel not ordered. Ordered  prn imodium with subsequent improvement  6. 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  There is no evidence of hepatic encephalopathy and ammonia is within normal limits.  Lactulose would not not be beneficial in thi setting especially due to diarrhea.  Cont baseline spironolactone.   7. Memory impairment, she's clearly repetitive during my interviews and per dtr this is her baseline, no e/o delirium.    8. Obstructive sleep apnea:apparently had re-evaluation and was told she does not require CPAP  9. Pulmonary fibrosis: without acute exacerbation   10. RLS: ropinirole resumed 2/7/19 Worse due to hypokalemia, replaced with protocol, mag wnl - better today.    DVT Prophylaxis: Pneumatic Compression Devices  Code Status: Full Code  Functional:  Lives independently in own apt-has walker-uses it 75 % of the time, feels very weak so will ask for PT input    Disposition Plan   Expected discharge in 2-3 days to tbd once  "diuresis/diarrhea/weakness addressed - possibly will need TCU per PT claudio  .     Entered: Idania Hansen 02/08/2019, 9:00 AM     Discussed in depth with her daughter by phone      Interval History (Subjective):      Notes a bit of a hoarse voice today, but thinks breathing is ok, had good night sleep last night as no diarrhea overnight.  Tolerated breakfast without immediate diarrhea this am.       Physical Exam:      Last Vital Signs:  /44 (BP Location: Right arm)   Pulse 63   Temp 97.8  F (36.6  C) (Oral)   Resp 16   Ht 1.626 m (5' 4\")   Wt 88.3 kg (194 lb 9.6 oz)   LMP  (LMP Unknown)   SpO2 90%   BMI 33.40 kg/m      I/O: multiple unmeasured UO, st cath without e/o retention   Weights-not done for today, will recheck in am     Pleasant no acute distress looks stated age head is normocephalic atraumatic and sclera clear lungs are actually clear to auscultation she exhibits normal respiratory effort heart is of a regular rate and rhythm without murmurs rubs or gallops abdominal exam is soft nontender nondistended skin is warm dry there is no cyanosis or clubbing of the extremities.  Lower extremity she has diffuse 2-3+ soft pitting lower extremity edema left foot she is got an area of an abrasion in between the second and third digit on the dorsal side.  There is some surrounding erythema there is no discharge.  There is minimal warmth and minimal tenderness (although this is in the setting of a peripheral neuropathy)           Medications:      All current medications were reviewed with changes reflected in problem list.         Data:      All new lab and imaging data was reviewed.   Labs:  Recent Labs   Lab 02/08/19 0718      POTASSIUM 3.7   CHLORIDE 111*   CO2 22   ANIONGAP 11   *   BUN 31*   CR 0.92   GFRESTIMATED 57*   GFRESTBLACK 66   MEGHAN 7.9*     Recent Labs   Lab 02/08/19 0718   WBC 6.3   HGB 9.8*   HCT 31.1*   MCV 89   *     Recent Labs   Lab 02/08/19 0718 02/08/19  0213 " 02/07/19  2224 02/07/19  1815 02/07/19  1254 02/07/19  0651 02/07/19  0156  02/06/19  0803  02/05/19  1737   *  --   --   --   --  114*  --   --  103*  --  117*   BGM  --  139* 132* 144* 178*  --  145*   < >  --    < >  --     < > = values in this interval not displayed.      Imaging:

## 2019-02-08 NOTE — PLAN OF CARE
Alert to self, forgetful and confused at times. VSS: on 2l oxygen nc. Up with Ax1, gbelt and walker. CMS: +3 edema BLE. Drsg: left foot had dry drainage. PRN Tylenol given for pain. Voiding adequately. Fluids encouraged. Nursing continue to monitor.

## 2019-02-08 NOTE — PLAN OF CARE
Vitals stable and afebrile. Up in chair with feet elevated. Amb to bathroom to void with assist of one and walker and gait belt. Cont on iv antibiotics. Glucose monitored. Potassium recheck wdl. Continues forgetful.

## 2019-02-09 NOTE — PLAN OF CARE
RN [id number]  Pt A&O, pleasant.  Up with Ax1, walker and gb.  Pain controlled with tylenol.  IV saline locked, IV ancef administered.  Wound care completed today on days.  +3 edema to BLE.  .  Will likely discharge Monday to home.  Will continue to monitor.

## 2019-02-09 NOTE — PLAN OF CARE
A&Ox4, Ax1 w/ walker, tolerating mod-cho diet, heart sounds- WNL, lungs- clear, bowels- active, voiding without difficulty, pain managed with tylenol, 91-92% O2 on RA at rest, edema in BLE, wound care done during AM shift. - no insulin given

## 2019-02-09 NOTE — PLAN OF CARE
Pt alert and oriented, with episodes of forgetfulness. Lung sounds diminished and clear bilaterally. Bowel sounds hypoactive and is passing flatus.  Denied pain and declined tyl. Vs 99.2, 16, 63, 102/48, 93% 1lt/nc. [place] foot and ankle edema 3+ pitting.  Left foot anterior o/a covered with foam clean and intact. Repositioned.  Continues on ancef, appeared to rest between cares. Will continue to monitor.

## 2019-02-09 NOTE — PROGRESS NOTES
[facility]  Hospitalist Progress Note  Idania Hansen MD 02/09/2019    Reason for Stay (Diagnosis): Generalized weakness, diarrhea, left foot erythema         Assessment and Plan:      Summary of Stay: Evy Song is a 84 year old female with a history of URBAN and associated cirrhosis, mild REY and no current need for CPAP, pulmonary fibrosis, hypertension, type 2 diabetes, and supraventricular tachycardia admitted on 2/5/2019 with multiple complaints including generalized weakness, poor p.o. intake, diarrhea, increased LE swelling, and left foot erythema.    Evaluation is notable for leukocytosis without fever, significantly elevated lactate at 4.6, suspected left lower foot cellulitis, CT scan showing a moderate amount of ascites with a cirrhotic liver and prominent gastroesophageal varices.    She has been initiated on empiric antibiotics in the form of cefazolin and has had a significant improvement in her white count.  She is receiving IV furosemide for prominent bilateral le edema in setting of diuretic noncompliance.    Has been sob mostly with exertion but noted to be hypoxic throughout hospitalization although she thinks her breathing is improving.     Diarrhea much improved after initiation of imodium.  So had decent sleep last night     Problem List:   1. Left foot cellulitis: near area of recent trauma, definite erythema but minimal warmth, not impressive from an infectious standpoint, will treat with limited abx regimen-cont cefazolin for now day 4/5, appreciate WOC input  2. Bilateral LE edema:  Likely due to noncompliance with furosemide (she only takes it intermittently as it interferes with her ability to participate in activities) as well as liver dz.  Suspect she also has bowel wall edema that makes oral furosemide not effective. Tolerating IV furosemide from a renal standpoint although UO not impressive weights not completed. Cont with furosemide 40 mg q4 x 2 today and reassess in  am.   3. Leukocytosis: Rather prominent in the setting of an unimpressive cellulitis: improved after abx.  She has no abdominal pain to suggest SBP, did not pursue US guided paracentesis at this time-rechecked and now wnl   4. Lactic acidosis: I suspect this is inadequate cell perfusion in the setting of dehydration from diarrhea and I think it is quite elevated because of her chronic liver failure. There is no evidence of sepsis.  I see no culprit medications, improved to 2.6-will recheck in am   5. Diarrhea: No evidence of colitis on CT scanning, enteric panel and C. difficile been ordered-specimen collected and results pending.  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  Of note c diff negative, enteric panel not ordered. Ordered  prn imodium with subsequent improvement  6. 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  There is no evidence of hepatic encephalopathy and ammonia is within normal limits.  Lactulose would not not be beneficial in this setting especially due to diarrhea.  Cont baseline spironolactone.  Did have worsening bili improvement anemia on admission which now appears back to or better than baseline  7. Memory impairment, she's clearly repetitive during my interviews and per dtr this is her baseline, no e/o delirium.  stable  8. Obstructive sleep apnea:apparently had re-evaluation and was told she does not require CPAP  9. Pulmonary fibrosis: without acute exacerbation   10. RLS: ropinirole resumed 2/7/19 Worse due to hypokalemia, replaced with protocol, mag wnl -   DVT Prophylaxis: Pneumatic Compression Devices  Code Status: Full Code  Functional:  Lives independently in own apt-has walker-uses it 75 % of the time, feels very  "weak so will ask for PT input    Disposition Plan   Expected discharge in 2-3 days to tbd once adequate diuresis complete- possibly will need TCU per PT eval  .     Entered: Idania Hansen 02/09/2019, 5:30 PM     Discussed in depth with her daughter by phone      Interval History (Subjective):      Feels like a new person today.  Has been up ambulating.  She denies any chest pain or shortness of breath.  No more left foot pain.  Still with some significant lower extremity edema though.  Also notes that her restless leg syndrome is periodically acting up again today.  Definitely not as bad as it had been.  Continues to have intermittent diarrhea but dramatically improved.  She is tolerating p.o. without difficulty which is a great improvement from her presentation       Physical Exam:      Last Vital Signs:  /48   Pulse 69   Temp 98.6  F (37  C) (Oral)   Resp 18   Ht 1.626 m (5' 4\")   Wt 88.3 kg (194 lb 9.6 oz)   LMP  (LMP Unknown)   SpO2 93%   BMI 33.40 kg/m      I/O: multiple unmeasured UO, st cath without e/o retention   Weights-not done for today, will recheck in am     Pleasant no acute distress looks stated age head is normocephalic atraumatic and sclera clear lungs are actually clear to auscultation she exhibits normal respiratory effort heart is of a regular rate and rhythm without murmurs rubs or gallops abdominal exam is soft nontender nondistended skin is warm dry there is no cyanosis or clubbing of the extremities.  Lower extremity she has diffuse 2-3+ soft pitting bilateral lower extremity edema,  left foot she is got an area of an abrasion in between the second and third digit on the dorsal side with mild surrounding erythema which has been receding.  There is some surrounding erythema there is no discharge.  There is minimal warmth and minimal tenderness (although this is in the setting of a peripheral neuropathy)           Medications:      All current medications were reviewed with " changes reflected in problem list.         Data:      All new lab and imaging data was reviewed.   Labs:  Recent Labs   Lab 02/09/19  0612      POTASSIUM 3.7   CHLORIDE 109   CO2 25   ANIONGAP 6   *   BUN 28   CR 0.93   GFRESTIMATED 56*   GFRESTBLACK 65   MEGHAN 7.9*     Recent Labs   Lab 02/09/19  0612   WBC 6.5   HGB 10.3*   HCT 32.9*   MCV 90   PLT 96*     Recent Labs   Lab 02/09/19  1340 02/09/19  0612 02/09/19  0156 02/08/19  2137 02/08/19  1737 02/08/19  1330 02/08/19  0718  02/07/19  0651  02/06/19  0803  02/05/19  1737   GLC  --  102*  --   --   --   --  135*  --  114*  --  103*  --  117*   *  --  110* 135* 120* 130*  --    < >  --    < >  --    < >  --     < > = values in this interval not displayed.      Imaging:

## 2019-02-09 NOTE — PLAN OF CARE
Discharge Planner PT   Patient plan for discharge: home but open to TCU if needed she would like to hire help for at home vs TCU  Current status: Pt amb 65' with ww with with supervsion with 1 L of O2.  Note O2 @ beginning to be 91% with 1L and following amb sats decreased to 78% with unable to recover to 90%.  Notfied nursing and increased to 2L of O2 with recovery time of 3 min along with PLB.Pt transfers supine to sit with supervision with HOB elevated and use of bed rail.  Pt transfers sit to/from stand with SBA and supervision with bed to wc with ww.   Barriers to return to prior living situation: limited activity tolerance, unstable SaO2 with activity requiring several minutes to recover, wound on dorsum of L foot, lives alone  Recommendations for discharge: TCU per plan established by the PT.    Rationale for recommendations: Currently functioning below baseline, will need ongoing skilled PT intervention to improve independence and safety with functional mobility skills prior to returning home.           Entered by: Chioma Foy 02/09/2019 11:18 AM

## 2019-02-09 NOTE — PROGRESS NOTES
Patient A&Ox4, forgetful at times. VSS. LS diminished, 1 LPM of O2. +BS/gas, 2 loose BMs early this am, immodium given, patient reports lack of appetite, blood glucose monitoring. Dressing CDI. +3 edema to BLE. Possible discharge home Monday. Will continue to monitor.

## 2019-02-10 NOTE — PLAN OF CARE
Pt A&O, repetitive and forgetful at times.  On 2L O2 satting at 91-92%.  Up with Ax1, gb and walker.  Voiding adequate amounts.  IV access lost and restarted this shift - IV medications pushed back.  IV lasix and ancef given per order, saline locked.  Dressing to L foot CDI, new dressing placed today after shower.  BS active, passing flatus.  +3 edema to BLE.  BG monitoring.  Possible discharge home Monday.

## 2019-02-10 NOTE — PLAN OF CARE
Pt alert and oriented. Vs 98.9, 18, 89, 120/54, 90% 2lts/nc. Pt alert and oriented.  Pt noted that to noc, left foot hurts, edema some improvement   3+ noted. Given tylenol. Pt also had c/o difficulty sleeping 2nd to RLS.   Pt given melatonin. Will continue to monitor. Pt right foot 4+edema noted.   Dressing left foot foam clean and intact.

## 2019-02-10 NOTE — PROGRESS NOTES
Patient A&Ox4, forgetful at times. VSS. Ax1 w/ WW & GB. +BS/gas, reports lack of appetite today and nausea, PRN zofran givenx1, BG monitoring. Voiding in adequate amounts, on IV lasix and PO spironolactone. +3 edema to BLE, R foot +4 edema. Dressing change done today, is CDI. Will continue to monitor.

## 2019-02-10 NOTE — PROGRESS NOTES
[facility]  Hospitalist Progress Note  Jose Jasmine MD   02/10/2019    Reason for Stay (Diagnosis): Generalized weakness, diarrhea, left foot erythema         Assessment and Plan:      Summary of Stay: Evy Song is a 84 year old female with a history of URBAN and associated cirrhosis, mild REY and no current need for CPAP, pulmonary fibrosis, hypertension, type 2 diabetes, and supraventricular tachycardia admitted on 2/5/2019 with multiple complaints including generalized weakness, poor p.o. intake, diarrhea, increased LE swelling, and left foot erythema.    Evaluation is notable for leukocytosis without fever, significantly elevated lactate at 4.6, suspected left lower foot cellulitis, CT scan showing a moderate amount of ascites with a cirrhotic liver and prominent gastroesophageal varices.    She has been initiated on empiric antibiotics in the form of cefazolin and has had a significant improvement in her white count.  She is receiving IV furosemide for prominent bilateral le edema in setting of diuretic noncompliance.    Has been sob mostly with exertion but noted to be hypoxic throughout hospitalization although she thinks her breathing is improving.     Diarrhea much improved after initiation of imodium.     At this point she remains admitted for ongoing anasarca with careful diuresis.    Problem List:   1. Left foot cellulitis: near area of recent trauma, definite erythema but minimal warmth, not impressive from an infectious standpoint, will treat with limited abx regimen-cont cefazolin for now day 5/5, appreciate WOC input    2. Bilateral LE edema:  Likely due to noncompliance with furosemide (she only takes it intermittently as it interferes with her ability to participate in activities) as well as liver dz.  Suspect she also has bowel edema that makes oral furosemide not effective. Tolerating IV furosemide from a renal standpoint although UO not impressive weights not  completed frequently.   --Cont with furosemide 40 mg q4 x 2 again today and reassess in am.   --Consider Bumex plus or minus albumin if not responding well to the above.    3. Leukocytosis: Rather prominent in the setting of an unimpressive cellulitis: improved after abx.  She has no abdominal pain to suggest SBP, did not pursue US guided paracentesis at this time-rechecked and now wnl     4. Mildly elevated lactic acid: Likely related to pulmonary edema with mild hypoxia and liver disease. There is no evidence of sepsis.  I see no culprit medications.    5. Diarrhea: No evidence of colitis on CT scanning, enteric panel and C. difficile been ordered-specimen collected and results pending.  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  Of note c diff negative, enteric panel not ordered. Ordered  prn imodium with subsequent improvement    6. 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  There is no evidence of hepatic encephalopathy and ammonia is within normal limits.  Lactulose would not not be beneficial in this setting especially due to recent diarrhea.  Continue baseline spironolactone.  Did have worsening bili improvement anemia on admission which now appears back to or better than baseline    7. Memory impairment, she's clearly repetitive during my interviews and per dtr this is her baseline, no e/o delirium.  Stable    8. Obstructive sleep apnea:apparently had re-evaluation and was told she does not require CPAP    9. Pulmonary fibrosis: without acute exacerbation     10. RLS: ropinirole resumed 2/7/19 Worse due to hypokalemia, replaced with protocol, mag wnl -     DVT Prophylaxis: Pneumatic Compression Devices  Code Status: Full Code  Functional:  Lives independently in own apt-has walker-uses it 75 % of the time, feels very weak so  "will ask for PT input    Disposition Plan   Expected discharge in 2-3 days to tbd once adequate diuresis complete- possibly will need TCU per PT eval  .     Entered: Jose Jasmine 02/10/2019, 10:23 AM     Discussed in depth with her daughter by phone      Interval History (Subjective):      I assumed care, history reviewed  Lymphedema persists, renal function grossly stable so reordering 2 more doses of IV Lasix  Will complete IV antibiotics today  Lymphedema consult  I called her daughter for an update         Physical Exam:      Last Vital Signs:  /45 (BP Location: Right arm)   Pulse 89   Temp 98.7  F (37.1  C) (Oral)   Resp 18   Ht 1.626 m (5' 4\")   Wt 87.6 kg (193 lb 3.2 oz)   LMP  (LMP Unknown)   SpO2 93%   BMI 33.16 kg/m      I/O: multiple unmeasured UO, st cath without e/o retention   Weights-not done for today, will recheck in am     Pleasant elderly woman sitting up in her recliner chair  NC/AT, nasal cannula in place  Heart regular rate and rhythm lungs diminished at bilateral bases, faint rales.  Abdomen soft, non-tender  Extremities with tight pitting edema, suspect 3+  Left foot wound cleanly dressed with occlusive dressing, no surrounding erythema or discharge           Medications:      All current medications were reviewed with changes reflected in problem list.         Data:      All new lab and imaging data was reviewed.   Labs:  Recent Labs   Lab 02/10/19  0735      POTASSIUM 3.8   CHLORIDE 112*   CO2 22   ANIONGAP 9   *   BUN 26   CR 0.85   GFRESTIMATED 63   GFRESTBLACK 73   MEGHAN 8.1*     Recent Labs   Lab 02/10/19  0901   WBC 9.7   HGB 10.9*   HCT 34.0*   MCV 89   PLT 96*     Recent Labs   Lab 02/10/19  0735 02/10/19  0206 02/09/19  2130 02/09/19  1747 02/09/19  1340 02/09/19  0612 02/09/19  0156  02/08/19  0718  02/07/19  0651  02/06/19  0803   *  --   --   --   --  102*  --   --  135*  --  114*  --  103*   BGM  --  128* 171* 101* 133*  --  110*   < > "  --    < >  --    < >  --     < > = values in this interval not displayed.      Imaging:

## 2019-02-10 NOTE — PROGRESS NOTES
Discharge Planner   Discharge Plans in progress: Chart reviewed.  PT recommending TCU.  I spoke with pt who is requesting a referral for [facility] shared room (which I made).  She asked that I f/u with her dtr regarding transportation.  I have LVM for her dtr requesting a call back.  Barriers to discharge plan: uncertain   Follow up plan: Referral sent to [facility], waiting to hear from dtr regarding transportation.  PAS will need to be completed.        Entered by: Ayaka Yu 02/10/2019 12:22 PM     ADDENDUM:  [place] back from pt's dtr and she reports she would be providing transportation at discharge.       ADDENDUM:  [facility] has accepted pt.  Will need PAS

## 2019-02-11 NOTE — PROGRESS NOTES
"CLINICAL NUTRITION SERVICES - REASSESSMENT NOTE      MALNUTRITION: (2/11/2019)  % Weight Loss:  Weight loss does not meet criteria for malnutrition --> diuresed this admit  % Intake:  </= 75% for >/= 1 month (severe malnutrition) --> continues  Subcutaneous Fat Loss:  Orbital region mild depletion and Upper arm region moderate depletion  Muscle Loss:  Temporal region mild depletion, Clavicle bone region mild-moderate depletion and Dorsal hand region mild depletion  Fluid Retention:  At least moderate continues     Malnutrition Diagnosis: Non-Severe malnutrition  In Context of:  Acute illness or injury  Chronic illness or disease       EVALUATION OF PROGRESS TOWARD GOALS   Diet: Mod CHO  Supplement: Boost shake with meals     Intake/Tolerance:  Is forgetful at times.  MD documenting memory impairment.  Friend in room during time of visit.  Remains on diet as above.  Variable oral intakes since last RD assessment.  Appears was consuming closer to % of meals in beginning of admit, dropped to 25% yesterday.  Per review of ordering system, not consistently ordering meals.  On average, BID.  Meals are often small and lacking in regard to nutritional content, specifically protein (e.g. may order only oatmeal or fruit).  Liked the taste of her supplement though not consistently consuming as \"it makes me run to the bathroom\".  Hard to determine as beginning of admit likely meeting closer to <75% needs, now more likely <50% especially over past 24 hrs.  Continues to verbalize that decreased appetite, taste changes, and diarrhea are barriers to PO intakes.       ASSESSED NUTRITION NEEDS PER APPROVED PRACTICE GUIDELINES:  Dosing Weight 61.6 kg  Estimated Energy Needs: 6780-9170 kcals (25-30 Kcal/Kg)  Justification: maintenance  Estimated Protein Needs: 74-92 grams protein (1.2-1.5 g pro/Kg)  Justification: preservation of lean body mass, wound healing  Estimated Fluid Needs: >1 mL/kcal  Justification: maintenance      NEW " FINDINGS:   - Remains admitted for continued diuresis.  Likely TCU at discharge, pending PT input.    - Medications reviewed:   Lasix, Aldactone   Insulin regimen reviewed   - Labs reviewed including BG trending  - Wt loss/shifts during admit the result of diuresis masking true trends:  Vitals:    02/05/19 1720 02/05/19 2340 02/10/19 0920 02/11/19 0700   Weight: 83 kg (183 lb) 88.3 kg (194 lb 9.6 oz) 87.6 kg (193 lb 3.2 oz) 89 kg (196 lb 4.8 oz)    02/11/19 0806   Weight: 86.7 kg (191 lb 1.6 oz)   - Stooling patterns reviewed.  - +3-4 LE edema.      Previous Goals:   Pt to tolerate at least 50% meals TID + 1-2 supplements daily.   Evaluation: Not met    Previous Nutrition Diagnosis:   Inadequate oral intake related to baseline taste loss and poor appetite with ongoing diarrhea as evidenced by verbalized intakes likely meeting <75% estimated needs for the past 4-6 months, e/o fat and muscle loss, coding for non-severe malnutrition.   Evaluation: No change, continued below       CURRENT NUTRITION DIAGNOSIS  Inadequate oral intake related to decreased appetite, taste changes, and diarrhea as evidenced by meeting <75% needs x 6 days since admission.      INTERVENTIONS  Recommendations / Nutrition Prescription  Continue mod CHO combination of diet order.  Add 3 gram Na component with history and need for diuresis.     Change to half portion strawberry smoothie supplement BID between meals.    Add daily MVI/M.     Add room service with assist to encourage consistent ordering of meals.      Implementation  Medical Food Supplement and Multivitamin/Mineral: As above.    Goals  Patient to consume at least 50% of meals TID + 1-2 supplements daily to show consistent improvement in PO intakes.       MONITORING AND EVALUATION:  Progress towards goals will be monitored and evaluated per protocol and Practice Guidelines      Jaz Mackenzie RD, LD  Clinical Dietitian  3rd floor/ICU: [phone]  All other floors:  [phone]  Weekend/holiday: [phone]

## 2019-02-11 NOTE — PLAN OF CARE
Pt up A1 with walker and gait belt. Receiving albumin q6h and IV lasix. Up voiding. Passing flatus. Severe BLE edema. Wound care done. Lymphedema wraps applied via PT- if they bother pt them can be removed. Denies pain. Will continue to monitor.

## 2019-02-11 NOTE — PROGRESS NOTES
[facility]  Hospitalist Progress Note  Jose Jasmine MD   02/11/2019    Reason for Stay (Diagnosis): Generalized weakness, diarrhea, left foot erythema         Assessment and Plan:      Summary of Stay: Evy Song is a 84 year old female with a history of URBAN and associated cirrhosis, mild REY and no current need for CPAP, pulmonary fibrosis, hypertension, type 2 diabetes, and supraventricular tachycardia admitted on 2/5/2019 with multiple complaints including generalized weakness, poor p.o. intake, diarrhea, increased LE swelling, and left foot erythema.    Evaluation is notable for leukocytosis without fever, significantly elevated lactate at 4.6, suspected left lower foot cellulitis, CT scan showing a moderate amount of ascites with a cirrhotic liver and prominent gastroesophageal varices.    She was initiated on empiric antibiotics in the form of cefazolin and has had a significant improvement in her white count.  She is receiving IV furosemide for prominent bilateral le edema in setting of diuretic noncompliance.    Has been sob mostly with exertion but noted to be hypoxic throughout hospitalization although she thinks her breathing is improving.     Diarrhea much improved after initiation of imodium.     At this point she remains admitted for ongoing anasarca with careful diuresis.  She has completed antibiotics and we are pursuing lymphedema wraps.    Problem List:   1. Left foot cellulitis: resolved.  near area of recent trauma, definite erythema but minimal warmth, not impressive from an infectious standpoint, did treat with limited abx regimen-cont cefazolin for now completed day 5/5, appreciate WOC input    2. Bilateral LE edema:  Likely due to noncompliance with furosemide (she only takes it intermittently as it interferes with her ability to participate in activities) as well as liver dz.  Suspect she also has bowel edema that makes oral furosemide not effective.  Tolerating IV furosemide from a renal standpoint although UO not impressive weights not completed frequently.   --lymphedema consult, would benefit from wraps.  --lasix 20 mg TID today w/ 25 grams albumin to help pull/push fluid    3. Leukocytosis: resolved.  was Rather prominent in the setting of an unimpressive cellulitis: improved after abx.  She has no abdominal pain to suggest SBP, did not pursue US guided paracentesis at this time-rechecked and now wnl     4. Mildly elevated lactic acid: Likely related to pulmonary edema with mild hypoxia and liver disease. There is no evidence of sepsis.  I see no culprit medications.    5. Diarrhea: No evidence of colitis on CT scanning, enteric panel and C. difficile been ordered-specimen collected and results pending.  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  Of note c diff negative, enteric panel not ordered. Ordered  prn imodium with subsequent improvement    6. 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  There is no evidence of hepatic encephalopathy and ammonia is within normal limits.  Lactulose would not not be beneficial in this setting especially due to recent diarrhea.  Continue baseline spironolactone.  Did have worsening bili improvement anemia on admission which now appears back to or better than baseline    7. Memory impairment, she's clearly repetitive during my interviews and per dtr this is her baseline, no e/o delirium.  Stable    8. Obstructive sleep apnea:apparently had re-evaluation and was told she does not require CPAP    9. Pulmonary fibrosis: without acute exacerbation     10. RLS: ropinirole resumed 2/7/19.  Increase to 1 mg at night plus 0.5 mg in the afternoon to see if this helps.    DVT Prophylaxis: Pneumatic Compression Devices  Code Status: Full  "Code  Functional:  Lives independently in own apt-has walker-uses it 75 % of the time, feels very weak so will ask for PT input    Disposition Plan   Expected discharge in 2-3 days to tbd once adequate diuresis complete- possibly will need TCU per PT eval  .     Entered: Jose Jasmine 02/11/2019, 7:59 AM     Discussed in depth with her daughter by phone      Interval History (Subjective):      Continuing IV lasix today but adding albumin.  Increasing ropinerole  Stopping potassium.         Physical Exam:      Last Vital Signs:  /45   Pulse 89   Temp 99.4  F (37.4  C) (Oral)   Resp 16   Ht 1.626 m (5' 4\")   Wt 89 kg (196 lb 4.8 oz)   LMP  (LMP Unknown)   SpO2 92%   BMI 33.69 kg/m      I/O: multiple unmeasured UO, st cath without e/o retention   Weights-not done for today, will recheck in am     Pleasant elderly woman sitting up in her recliner chair  NC/AT, nasal cannula in place  Heart regular rate and rhythm lungs diminished at bilateral bases, faint rales.  Abdomen soft, non-tender  Extremities with tight pitting edema, suspect 3+  Left foot wound cleanly dressed with occlusive dressing, no surrounding erythema or discharge           Medications:      All current medications were reviewed with changes reflected in problem list.         Data:      All new lab and imaging data was reviewed.   Labs:  Recent Labs   Lab 02/11/19  0606      POTASSIUM 4.0   CHLORIDE 110*   CO2 24   ANIONGAP 7   *   BUN 27   CR 1.02   GFRESTIMATED 50*   GFRESTBLACK 58*   MEGHAN 7.8*     Recent Labs   Lab 02/10/19  0901   WBC 9.7   HGB 10.9*   HCT 34.0*   MCV 89   PLT 96*     Recent Labs   Lab 02/11/19  0606 02/11/19  0134 02/10/19  2132 02/10/19  1754 02/10/19  1338 02/10/19  0735 02/10/19  0206  02/09/19  0612  02/08/19  0718  02/07/19  0651   *  --   --   --   --  119*  --   --  102*  --  135*  --  114*   BGM  --  133* 145* 120* 128*  --  128*   < >  --    < >  --    < >  --     < > = values in " this interval not displayed.      Imaging:   No results found for this or any previous visit (from the past 24 hour(s)).

## 2019-02-11 NOTE — PROGRESS NOTES
02/11/19 1600   General Information   Discipline PT   Onset of Edema 02/01/19   Affected Body Part(s) Left LE;Right LE   Edema Etiology Infection  (Cellulitis, also URBAN in PMH-with non compliance? with meds?)   Etiology Comments Pt reports never had issues with swelling in past. Pt reports started with infection of her L LE.    Pertinent history of current problem (PT: include personal factors and/or comorbidities that impact the POC; OT: include additional occupational profile info) Evy Song is a 84 year old female with a history of URBAN and associated cirrhosis, mild REY and no current need for CPAP, pulmonary fibrosis, hypertension, type 2 diabetes, and supraventricular tachycardia admitted on 2/5/2019 with multiple complaints including generalized weakness, poor p.o. intake, diarrhea, increased LE swelling, and left foot erythema. MD stating that increased edema due to non-compliance of furosemide (she only takes it intermittently as it interferes with her ability to participate in activities) as well as liver dz.   Edema Precautions Acute infection  (over 24 hours with decreased erythema noted )   Pain   Patient currently in pain Yes, see Vital Signs flowsheet   Edema Examination / Assessment   Skin Condition Pitting;Dryness  (2+/3+pitting)   Ulcerations Yes   Ulcer Comments Pt currently treated from [facility] for L dorsum of foot wound, covered with banadaging, no notes re: keeping it open to air.    Stemmer Sign Positive   Stemmer Sign Comments Unable to pull from 2nd toe B    ROM   Range of Motion (WFL) other (describe)   Description of Range of Motion Deficits Limited B LE ROM in hips and knees due to increased swelling   Strength   Strength (WFL) other (describe)   Description of Strength Deficits 3-/5 with B LEs- needing A with LEs into bed   Assessment/Plan   Patient presents with Other (see comments);Edema  (Edema due to cellulitis and likely liver dz)   Assessment Pt would benefit from Quick wraps  in order to decrease swelling and improve tissue integrity.    Clinical Presentation Stable/Uncomplicated   Clinical Presentation Rationale improving   Clinical Decision Making (Complexity) Low complexity   Planned Edema Interventions Gradient compression bandaging;Exercises;Precautions to prevent infection/exacerbation;Education;Manual therapy   Treatment Frequency daily   Treatment Duration 3 days   Patient, Family and/or Staff in agreement with plan of care. Yes   Risks and benefits of treatment have been explained. Yes   Total Evaluation Time   Total Evaluation Time (Minutes) 5

## 2019-02-11 NOTE — PLAN OF CARE
VS: low grade fever, 99.5  Orientation: A/O x4  Tele: NA  Glucose checks: QID, bedtime 145  Activity: x1 walker/gb  Diet: mod carb  GI: some abdominal discomfort, passing gas but needs to have BM  : voiding  Respiratory: dim lower lobes, 2LNC, RA baseline  IV: SL, IV lasix and ancef intermittent  Plan: discharge to TCU

## 2019-02-12 NOTE — PLAN OF CARE
Discharge Planner PT   Patient plan for discharge: TCU   Current status:   Lymph: L LE improving. Pt continues to be appropriate for LE quick wrap, using short stretch (SS) bandages, not ACE wraps with 2+ edema in lower LEs in B LEs and L foot and 3+ in dorsum of R foot. Pt educated in rationale for compression with wound healing and importance of using SS bandages which have low resting pressure and high working pressure, vs ACE wraps which have high resting pressure and low working pressure. LEs washed, lotion applied, and quick wraps completed to B LE, with appropriate stretch and spacing to allow gradient compression.  Wraps applied bilaterally. R dorsum was also padded with ABD pad cut to half [place] to increase padding to dorsum of the foot. Coordinated with nursing and pt regarding wearing schedule, and completing LE cares for the following day prior to therapy arrival. Updated white board with schedule. Pt and RN educated if pt does not tolerate wraps well, please remove wraps but keep LEs elevated. Spent time going through lymph exercises:Toe curls, APs/circles, QS, GS, heel slides, hip abduction, SLR and diaphragmatic breathing x 5 each.    PT: Pt improving, SBA for supine to sit but use of rail, Cues and min A for sit to supine. Pt was cued for sit<>Stand with good technique, cues for proper hand placement on walker. Pt was cued for 80 feet of ambulation on 3 liters of O2, sats dropping to 87% following, cued for PLB.   Barriers to return to prior living situation: needing A with bed mob, SOB with minimal activity  Recommendations for discharge: TCU with Lymphedema trained therapy  Rationale for recommendations: Currently functioning below baseline, will need ongoing skilled PT intervention to improve independence and safety with functional mobility skills prior to returning home.         Entered by: Zahida Shukla 02/12/2019 3:00 PM

## 2019-02-12 NOTE — PLAN OF CARE
A&O x4, forgetful. Up w/Ax1 w/walker. Requiring 2L O2. Low BPs: 92/41, 103/44. BS/flatus+. Tolerating mod carb diet well. DTV, bladder scanned for around 150-200ml at 0630. CMS: intermittent numbness and tingling to BLE's - baseline. +3 edema to BLE's. Lymphedema wraps on - pt tolerating well. Denies having much pain. Plan is TCU at discharge.

## 2019-02-12 NOTE — PROGRESS NOTES
[facility] Nurse Inpatient Wound Assessment     Assessment of wound(s) on pt's:   Left Dorsal  Foot        Data:   Patient History:      per MD note(s): 84-year-old female with a history of nonalcoholic steatohepatitis with resultant cirrhosis, obstructive sleep apnea, pulmonary fibrosis on chronic oxygen, diabetes mellitus type 2, on oral medications, who presents to the hospital today for the above concerns.  She also is concerned about lower extremity edema which sounds more like a chronic process for her.  She also has weakness of the bilateral lower extremities which is more of a chronic process as well.      Current Diet / Nutrition:     Orders Placed This Encounter      Combination Diet 9514-9026 Calories: Moderate Consistent CHO (4-6 CHO units/meal); 3 gm NA Diet              Ronald Assessment and sub scores:  Ronald Risk Assessment    Sensory Perception: 3-->slightly limited    Moisture: 4-->rarely moist   Activity: 3-->walks occasionally     Mobility: 3-->slightly limited   Nutrition: 3-->adequate     Ronald Score: 19         Mattress:  Standard , Atmos Air mattress  Labs:     Recent Labs   Lab Test 02/12/19  0714 02/10/19  0901  02/05/19  1959 02/05/19  1737  01/25/19  0948   ALBUMIN 2.6*  --    < > 2.4* 2.4*   < > 2.4*   HGB  --  10.9*   < > 11.8 11.9   < > 12.3   INR  --   --   --   --   --   --  1.42*   WBC  --  9.7   < > 21.6* 22.2*   < > 7.1   A1C  --   --   --  5.7*  --    < >  --    CRP  --   --   --   --  90.1*  --   --     < > = values in this interval not displayed.          Wound Assessment (location #1):   Left Dorsal Foot  Wound History:   Blister unroofed    Specific Dimensions (length x width x depth, in cm):   <1 x 1.8 x 0.1cm moist pink tissue    Periwound Skin: superficial erosion of epidermis improved per RN with use of wraps.     Dry skin to plantar and thick calloused skin     Drainage:   Amount: none,    Odor: none         Intervention:     Patient's chart  evaluated.      Wound(s) was assessed    Wound Care:  done:  Per  POC  By RN    Orders in place and effective    Supplies  In place    Discussed plan of care with RN          Assessment:       Suspect  Pt has PVD;  Severe edema improving with Lymphedema wraps by PT.   Unroofed blister improving. Will discontinue Iodosorb and use Mepilex.   Continue wraps and elevate for edema.        Plan:     Nursing to notify the Provider(s) and re-consult the WOC Nurse if wound(s) deteriorate(s) or if the wound care plan needs reevaluation.    Plan of care for wound located on Left  foot: Daily    1.  Wipe leg and foot with bath wipes; Sween to dry skin on bottom of foot and leg    2. Rinse wound with wound spray during dressing changes, pat dry     3. Apply Mepilex Border to cover and pad wound; change Q 3 days, Date    4. Continue with Lymphedema wraps per PT and elevate BLE for edema; Daily    WOC Nurse will return: weekly

## 2019-02-12 NOTE — PROGRESS NOTES
[facility]  Hospitalist Progress Note  Jose Jasmine MD   02/12/2019    Reason for Stay (Diagnosis): Generalized weakness, diarrhea, left foot erythema         Assessment and Plan:      Summary of Stay: Evy Song is a 84 year old female with a history of URBAN and associated cirrhosis, mild REY and no current need for CPAP, pulmonary fibrosis, hypertension, type 2 diabetes, and supraventricular tachycardia admitted on 2/5/2019 with multiple complaints including generalized weakness, poor p.o. intake, diarrhea, increased LE swelling, and left foot erythema.    Evaluation is notable for leukocytosis without fever, significantly elevated lactate at 4.6, suspected left lower foot cellulitis, CT scan showing a moderate amount of ascites with a cirrhotic liver and prominent gastroesophageal varices.    She was initiated on empiric antibiotics in the form of cefazolin and has had a significant improvement in her white count.  She is receiving IV furosemide for prominent bilateral le edema in setting of diuretic noncompliance.    Has been sob mostly with exertion but noted to be hypoxic throughout hospitalization although she thinks her breathing is improving.     Diarrhea much improved after initiation of imodium.     At this point she remains admitted for ongoing anasarca with careful diuresis.  She has completed antibiotics and we placed lymphedema wraps.    Problem List:   1. Left foot cellulitis: resolved.  near area of recent trauma, definite erythema but minimal warmth, not impressive from an infectious standpoint, did treat with limited abx regimen-cont cefazolin for now completed day 5/5, appreciate WOC input    2. Bilateral LE edema:  Likely due to noncompliance with furosemide (she only takes it intermittently as it interferes with her ability to participate in activities) as well as liver dz.  Suspect she also has bowel edema that makes oral furosemide not effective. Tolerating IV  furosemide from a renal standpoint although UO not impressive weights not completed frequently.   --lymphedema consult, now has wraps.  --lasix 20 mg BID today w/ 25 grams albumin to help pull/push fluid    3. Leukocytosis: resolved.  was Rather prominent in the setting of an unimpressive cellulitis: improved after abx.  She has no abdominal pain to suggest SBP, did not pursue US guided paracentesis at this time-rechecked and now wnl     4. Mildly elevated lactic acid: Likely related to pulmonary edema with mild hypoxia and liver disease. There is no evidence of sepsis.  I see no culprit medications.    5. Diarrhea: No evidence of colitis on CT scanning, enteric panel and C. difficile been ordered-specimen collected and results pending.  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  Of note c diff negative, enteric panel not ordered. Ordered  prn imodium with subsequent improvement    6. 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  There is no evidence of hepatic encephalopathy and ammonia is within normal limits.  Lactulose would not not be beneficial in this setting especially due to recent diarrhea.  Continue baseline spironolactone.  Did have worsening bili improvement anemia on admission which now appears back to or better than baseline    7. Memory impairment, she's clearly repetitive during my interviews and per dtr this is her baseline, no e/o delirium.  Stable    8. Obstructive sleep apnea:apparently had re-evaluation and was told she does not require CPAP    9. Pulmonary fibrosis: without acute exacerbation     10. RLS: ropinirole resumed 2/7/19.  Increase to 1 mg at night plus 0.5 mg in the afternoon to see if this helps.    DVT Prophylaxis: Pneumatic Compression Devices  Code Status: Full Code  Functional:  Lives independently in  "own apt-has walker-uses it 75 % of the time, feels very weak.  ?TCU upon discharge.    Disposition Plan   ?TCU tomorrow.  .     Entered: Jose Jasmine 02/12/2019, 8:08 AM           Interval History (Subjective):      Continuing IV lasix today but only twice instead of TID adding albumin.  Lymphedema wraps placed.  Mild nose bleed         Physical Exam:      Last Vital Signs:  /44 (BP Location: Right arm)   Pulse 89   Temp 98.8  F (37.1  C) (Oral)   Resp 16   Ht 1.626 m (5' 4\")   Wt 79.8 kg (176 lb)   LMP  (LMP Unknown)   SpO2 93%   BMI 30.21 kg/m        Pleasant elderly woman sitting up in her recliner chair  NC/AT, nasal cannula in place  Heart regular rate and rhythm lungs diminished at bilateral bases, faint rales.  Abdomen soft, non-tender  Extremities with lymphedema wraps now in place.  3+ edema proximal to these.  Left foot wound cleanly dressed with occlusive dressing, no surrounding erythema or discharge           Medications:      All current medications were reviewed with changes reflected in problem list.         Data:      All new lab and imaging data was reviewed.   Labs:  Recent Labs   Lab 02/12/19  0714      POTASSIUM 3.5   CHLORIDE 108   CO2 25   ANIONGAP 6   *   BUN 31*   CR 1.09*   GFRESTIMATED 46*   GFRESTBLACK 54*   MEGHAN 8.2*     Recent Labs   Lab 02/10/19  0901   WBC 9.7   HGB 10.9*   HCT 34.0*   MCV 89   PLT 96*     Recent Labs   Lab 02/12/19  0742 02/12/19  0714 02/12/19  0214 02/11/19  2218 02/11/19  1715 02/11/19  1329 02/11/19  0606  02/10/19  0735  02/09/19  0612  02/08/19  0718   GLC  --  100*  --   --   --   --  124*  --  119*  --  102*  --  135*   *  --  114* 137* 134* 129*  --    < >  --    < >  --    < >  --     < > = values in this interval not displayed.      Imaging:   No results found for this or any previous visit (from the past 24 hour(s)).  "

## 2019-02-12 NOTE — PLAN OF CARE
Pt up A1. NC 2.5L. Severe to moderate edema BLE. IV lasix and albumin. Denies pain. Dyspnea on exertion. Voiding. Passing flatus. Wound care done. PT following for lymphedema wraps. Currently working with SW regarding whether pt should go to TCU or home. Pt stated she wants to go home to staff. Will continue to monitor.

## 2019-02-12 NOTE — PLAN OF CARE
Pt A&O x4. VS stable; afebrile. Requiring 2L O2-pt states this is baseline for last couple of weeks. PO tylenol managing pain. CMS: intermittent numbness and tingling in BLE's @ baseline. +3 edema to BLE's. Lymphedema wraps on-pt tolerating well. Continues on IV albumin and Lasix. Up w/ A1, using gait belt, and walker. Voiding in good amts. Tolerating CHO diet. Plan is TCU @ discharge. Will continue to monitor.

## 2019-02-12 NOTE — PROGRESS NOTES
SWS     D: Discharge planning continuing.. noted per MD the possibility of pt's discharge to TCU tomorrow. Noted also PT continuing recommendation of pt's transfer to rehab facility.       I: SW today has met with pt and alosodiscsused by phone with daughter regarding MD documentation of possible discharge tomorrow to TCU, pt indicating that she has not made the determination that she will go to TCU vs home with home care including extended care services. Daughter notes that she has looked into extended care support for pt and that 24 hour care would cost pt about $400/day. Daughter will be in later today to speak with pt about planning.. daughter has requested also that SW recheck with [facility] regarding bed availability there, contact made, no openings at this time.      A/P: Will await pt/family determination of plan, will continue planning per pt decision and MD determination of discharge date.

## 2019-02-13 NOTE — PROGRESS NOTES
[facility]  Hospitalist Progress Note  Jose Jasmine MD   02/13/2019    Reason for Stay (Diagnosis): Generalized weakness, diarrhea, left foot erythema         Assessment and Plan:      Summary of Stay: Evy Song is a 84 year old female with a history of URBAN and associated cirrhosis, mild REY and no current need for CPAP, pulmonary fibrosis, hypertension, type 2 diabetes, and supraventricular tachycardia admitted on 2/5/2019 with multiple complaints including generalized weakness, poor p.o. intake, diarrhea, increased LE swelling, and left foot erythema.    Evaluation is notable for leukocytosis without fever, significantly elevated lactate at 4.6, suspected left lower foot cellulitis, CT scan showing a moderate amount of ascites with a cirrhotic liver and prominent gastroesophageal varices.    She was initiated on empiric antibiotics in the form of cefazolin and has had a significant improvement in her white count.  She is receiving IV furosemide for prominent bilateral le edema in setting of diuretic noncompliance.    Has been sob mostly with exertion but noted to be hypoxic throughout hospitalization although she thinks her breathing is improving.     Diarrhea much improved after initiation of imodium.     At this point she remains admitted for ongoing anasarca with careful diuresis.  She has completed antibiotics and we placed lymphedema wraps.    I suspect in a day or 2 she will be ready to transition to a TCU and social workers now involved.  We will continue IV Lasix for now.    Problem List:   1. Left foot cellulitis: resolved.  near area of recent trauma, definite erythema but minimal warmth, not impressive from an infectious standpoint, did treat with limited abx regimen-cefazolin --completed day 5/5, appreciate WOC input.  Continues to improve.    2. Bilateral LE edema:  Likely due to noncompliance with furosemide (she only takes it intermittently as it interferes with her  ability to participate in activities) as well as liver dz.  Suspect she also has bowel edema that makes oral furosemide not effective. Tolerating IV furosemide from a renal standpoint.  --Lymphedema improving.  --lymphedema consult, now has wraps.  I advised ongoing lymphedema clinic follow-up.  --lasix 40 mg BID today w/ 25 grams albumin to help pull/push fluid.  Ordered for 2 doses.    3. Leukocytosis: resolved.  was Rather prominent in the setting of an unimpressive cellulitis: improved after abx.  She has no abdominal pain to suggest SBP, did not pursue US guided paracentesis at this time-rechecked and now wnl     4. Mildly elevated lactic acid: Likely related to pulmonary edema with mild hypoxia and liver disease. There is no evidence of sepsis.  I see no culprit medications.    5. Diarrhea: No evidence of colitis on CT scanning, enteric panel and C. difficile been ordered-specimen collected and results pending.  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  Of note c diff negative, enteric panel not ordered. Ordered  prn imodium with subsequent improvement    6. 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  There is no evidence of hepatic encephalopathy and ammonia is within normal limits.  Lactulose would not not be beneficial in this setting especially due to recent diarrhea.  Continue baseline spironolactone.  Did have worsening bili improvement anemia on admission which now appears back to or better than baseline  --Anticipating discharging on both spironolactone and Lasix    7. Memory impairment, she's clearly repetitive during my interviews and per dtr this is her baseline, no e/o delirium.  Stable    8. Obstructive sleep apnea:apparently had re-evaluation and was told she does not require CPAP    9. Pulmonary fibrosis: without  "acute exacerbation.  It sounds as though she has had some oxygen at home for the past week or 2.    10. RLS: ropinirole resumed 2/7/19.  Increased to 1 mg at night plus 0.5 mg in the afternoon which seems to have helped..    DVT Prophylaxis: Pneumatic Compression Devices  Code Status: Full Code  Functional:  Lives independently in own apt-has walker-uses it 75 % of the time, feels very weak.  Anticipate TCU upon discharge.  Daughter is involved.    Disposition Plan   ?TCU tomorrow or Friday.  Continuing IV Lasix today and working on getting referrals out to TCU use.  .     Entered: Jose THOMAS Kenroy 02/13/2019, 12:36 PM           Interval History (Subjective):      Continuing IV lasix 40 mg BID w/ albumin.  Lymphedema wraps in place, LE edema better  Feels mildly nauseated  I spent approximately 45 minutes in the room with she and her daughter discussing the discharge plan and ongoing follow-up.           Physical Exam:      Last Vital Signs:  /48   Pulse 84   Temp 99  F (37.2  C) (Oral)   Resp 18   Ht 1.626 m (5' 4\")   Wt 88.4 kg (194 lb 12.8 oz)   LMP  (LMP Unknown)   SpO2 90%   BMI 33.44 kg/m        Pleasant elderly woman sitting up in her recliner chair  NC/AT, nasal cannula in place  Heart regular rate and rhythm lungs diminished at bilateral bases, faint rales.  Abdomen soft, non-tender  Extremities with lymphedema wraps now in place.  2+ edema proximal to these.  Left foot wound cleanly dressed with occlusive dressing, no surrounding erythema or discharge           Medications:      All current medications were reviewed with changes reflected in problem list.         Data:      All new lab and imaging data was reviewed.   Labs:  Recent Labs   Lab 02/13/19  0656      POTASSIUM 3.7   CHLORIDE 110*   CO2 25   ANIONGAP 7   *   BUN 30   CR 1.00   GFRESTIMATED 52*   GFRESTBLACK 60*   MEGHAN 8.8     Recent Labs   Lab 02/10/19  0901   WBC 9.7   HGB 10.9*   HCT 34.0*   MCV 89   PLT 96* "     Recent Labs   Lab 02/13/19  0656 02/13/19  0200 02/12/19  2144 02/12/19  1716 02/12/19  1231 02/12/19  0742 02/12/19  0714  02/11/19  0606  02/10/19  0735  02/09/19  0612   *  --   --   --   --   --  100*  --  124*  --  119*  --  102*   BGM  --  102* 135* 156* 174* 101*  --    < >  --    < >  --    < >  --     < > = values in this interval not displayed.      Imaging:   No results found for this or any previous visit (from the past 24 hour(s)).

## 2019-02-13 NOTE — PLAN OF CARE
Discharge Planner PT   Patient plan for discharge: TCU   Current status:   Lymph: L LE improving. Pt continues to be appropriate for LE quick wrap, using short stretch (SS) bandages, not ACE wraps with 2+ edema in lower LEs in B LEs and L foot and 3+ in dorsum of R foot. LEs washed, lotion applied, and quick wraps completed to B LE, with appropriate stretch and spacing to allow gradient compression.  Wraps applied bilaterally. R dorsum was also padded with ABD pad to increase padding to dorsum of the foot. Coordinated with nursing and pt regarding wearing schedule, and completing LE cares for the following day prior to therapy arrival. Pt and RN educated if pt does not tolerate wraps well, please remove wraps but keep LEs elevated. Patient participated in lymph exercises:Toe curls, APs/circles, QS, GS, heel slides, hip abduction, SLR and diaphragmatic breathing x 5 each.    Mobility: Patient supine upon arrival on 3LPM NC throughout session.  Patient required min A for bed mobility, patient using bed rail with HOB raised.  Patient transferred between sitting and standing from a variety of surfaces including bed, toilet and bedside chair with 2WW and CGA.  Patient amb 150 feet with 2WW and CGA.  Required intermittent cueing for walker management during turns.  Patient required assist with pericares while in bathroom.   Following activity, patient with SaO2 88%, increased to 92% with verbal cueing for pursed lip breathing.  Barriers to return to prior living situation: needing A with bed mob, SOB with minimal activity  Recommendations for discharge: TCU with Lymphedema trained therapy  Rationale for recommendations: Currently functioning below baseline, will need ongoing skilled PT intervention to improve independence and safety with functional mobility skills prior to returning home.         Entered by: Suzanna Kate 02/13/2019 5:57 PM

## 2019-02-13 NOTE — PROGRESS NOTES
Patient has been assessed for Home Oxygen needs.  Oxygen readings:   *   RA - at rest  Pulse oximetry SPO2  %  *  RA - during activity/with exercise SPO2 78%  *   O2 at  2 liters/minute (at rest) ...SPO2 93 %  *   O2 at  2-3 liters/minute (during activity/with exercise) ...SPO2 91 %

## 2019-02-13 NOTE — PLAN OF CARE
VS: stable  Orientation:A/O x4, forgetful  Tele: NA  Glucose checks:  Activity: overnight 11-4  Diet:mod carb  GI: WDL  :N/V/R  Respiratory:2Lnc  IV: SL  Plan: discharge to tcu

## 2019-02-13 NOTE — PROGRESS NOTES
SWS    D: Discharge planning continuing.. noted per MD documentation the anticipation of discharge tomorrow or Friday. Noted PT progress, continued recommendation of pt's tarsnefr to rehab facility on discharge. [facility] or [facility]-private[facility], [facility] pending [facility] availability pending bed availability on discharge date.     I: Met with pt, daughter also present in room. After continued discussion pt has determined that she would transfer to rehab facility, discussed above. Pt notes that her facility of preference would be [facility] however due to the uncertainty of bed availability it was requested by pt/daughter that arrangements be made for pt to transfer to [facility], private room requested. Discussed with them the private room cost of $45/day at [facility] which they have acknowledged and accepted. Per continued discussion arrangements have been made for daughter to provide transport for pt to [facility] @ 1500 tomorrow. Additional questions have been addressed including determining factors for rehab facility length of stay.     A/P: Anticipate no problem with arrangements as noted above, will adjust arrangements accordingly if pt is not ready for transfer tomorrow.

## 2019-02-13 NOTE — TELEPHONE ENCOUNTER
I will need her discharge summary and orders to continue care. I would prefer the on site physician monitor her due to her wound.

## 2019-02-13 NOTE — PLAN OF CARE
DAy RN  Vss besides low grade temp in am.   CMS+ +3 edema in lower ext. Denied SOB does have MARY present. Needs 02 at 3L to maintain sats at rest and activity,encouraged hourly IS use.   Mild nausea didn't want to eat declined zofran. Encouraged to try and eat drank ok.  Wound care done per orders. Lymph wraps in place +2-3 swelling in lower ext.   Declined to get out of bed besides the bathroom in am. Walked in afternoon and tolerated sitting up in chair.   Voiding well small bm today.   Complains of minor dizziness, BP low 100's.

## 2019-02-13 NOTE — TELEPHONE ENCOUNTER
Eyv is going to [facility] facility tomorrow after being in the hospital for left foot cellulitis and sepsis. Brissa from their admissions office called to ask if Dr. Chapin will continue orders and medication during her stay there. Just want to confirm that? Thanks.        Brissa's call back #: [phone]

## 2019-02-13 NOTE — PLAN OF CARE
Pt A&O x4. VS stable; afebrile. Remains on 2L O2. Denies pain. CMS intact. +2-3 edema in BLE's. Lymphedema wraps in place-legs elevated. Up w/ A1, using gait belt and walker. Voiding in good amts. Tolerating CHO diet. Plan is for possible discharge to TCU tomorrow. Will continue to monitor.

## 2019-02-14 NOTE — PLAN OF CARE
Discharge Planner PT   Patient plan for discharge: TCU   Current status:   Lymph: L LE improving. Pt continues to be appropriate for LE quick wrap, using short stretch (SS) bandages, not ACE wraps with 2+ edema in lower LEs in B LEs and L foot and 3+ in dorsum of R foot, now only small pocket of fluid on the very lateral edge. LEs washed, lotion applied, and quick wraps completed to B LE, with appropriate stretch and spacing to allow gradient compression.  Wraps applied bilaterally. R dorsum was also padded with ABD pad to increase padding to dorsum of the foot. Also B ankles padded as her swelling is improving in this area and attempting to control for cone shape of the LE. Pt was educated to have lymph therapist at TCU check skin tomorrow and re-wrap as indicated.     Mobility: Deferred as was close to discharge and wanting to conserve energy for discharge home.   Barriers to return to prior living situation: needing A with bed mob, SOB with minimal activity  Recommendations for discharge: TCU with Lymphedema trained therapy  Rationale for recommendations: Currently functioning below baseline, will need ongoing skilled PT intervention to improve independence and safety with functional mobility skills prior to returning home.         Entered by: Zahida Shukla 02/14/2019 3:22 PM       Physical Therapy Discharge Summary     Reason for therapy discharge:    Discharged to transitional care facility.     Progress towards therapy goal(s). See goals on Care Plan in [facility] electronic health record for goal details.  Goals partially  Met needing A with bed mob, sats dropping with activity.      Therapy recommendation(s):    Continued therapy is recommended.  Rationale/Recommendations:  Pt is below baseline with functional mobility and strength and would benefit from continued PT to progress skills. Pt continues to need lymph wrapping to improve swelling.

## 2019-02-14 NOTE — PLAN OF CARE
DAy RN  Vss besides low grade temp 99 x1   CMS+ +3 edema in lower ext. Denied SOB>   Needs 02 at 3L to maintain sats at rest and activity,encouraged hourly IS use.   Ate and drank well today.   Wound care done per orders. Lymph wraps in place +2-3 swelling in lower ext.    Walked in cee x2 up to chair most of am, showered and rested today.  Voiding well small bm today.   Tcu today at 1500 via family, packet given. discontinue instructions given to patient.

## 2019-02-14 NOTE — PROGRESS NOTES
Your information has been submitted on February 14th, 2019 at 11:49:47 AM [facility]. The confirmation number is [id number]

## 2019-02-14 NOTE — PLAN OF CARE
Pt A&O x4. VS stable; low-grade temp-encouraged use of IS. Continues on 3L O2. PO tylenol managing pain. CMS intact. Lymphedema wraps in place. BLE's elevated. +2-3 edema. Up w/ A1, using gait belt, and walker. Voiding in good amts. Tolerating regular diet-minimal appetite. Plan is to discharge to TCU tomorrow @ 1500. Will continue to monitor.

## 2019-02-14 NOTE — PLAN OF CARE
A&O. Low grade temp. Using IS. Wraps in place on legs. Pain managed with Tylenol. Assist of 1 with a walker and gait belt.

## 2019-02-14 NOTE — PROGRESS NOTES
"Transition Communication Hand-off for Care Transitions to Next Level of Care Provider    Name: Evy Song  : 1934  MRN #: [id number]  Primary Care Provider: Liz Chapin     Primary Clinic: [facility] [address] [address]     Reason for Hospitalization:  Portal hypertension (H) [K76.6]  Dehydration [E86.0]  Wound infection [T14.8XXA, L08.9]  Severe sepsis (H) [A41.9, R65.20]  Admit Date/Time: 2019  7:09 PM  Discharge Date: 2019  Payor Source: Payor: [employer] / Plan: UCARE MEDICARE / Product Type: HMO /     Readmission Assessment Measure (LYDIA) Risk Score/category: AVERAGE LYDIA          Reason for Communication Hand-off Referral: Fragility,  This pt has actually been maintaining well with her chronic disease management at home independently- URBAN, Pulm fibrosis. She was identified as having increased care needs on discharge and will transfer to TCU     Discharge Plan: TCU, [place]        Concern for non-adherence with plan of care:   Y/N YES noted per chart review pt intermittently take her Lasix because it \"interferes with her Act ivies.\"  She is in need of Lymphedema management as well as chronic disease education and best self management, her daughter is noted to supportive of pt.   Discharge Needs Assessment:  Needs      Most Recent Value   Equipment Currently Used at Home  walker, rolling, cane, straight, grab bar, tub/shower, shower chair   # of Referrals Placed by CTS  Communication hand-offs to next level of Care Providers         Any outstanding tests or procedures:    Procedures     Future Labs/Procedures    Oxygen - Nasal cannula     Comments:    3 Lpm by nasal cannula to keep O2 sats 92% or greater.          Referrals     Future Labs/Procedures    Occupational Therapy Adult Consult     Comments:    Evaluate and treat as clinically indicated.    Reason:  deconditioning    Physical Therapy Adult Consult     Comments:    Evaluate and treat as clinically " "indicated.    Reason:  deconditioning            Key Recommendations:    Please follow pt closely upon discharge from TCU   Noted per chart review pt \"intermittently take her Lasix because it interferes with Act ivies . She is in need of Lymphedema management as well as chronic disease education and best self management, her daughter is noted to supportive of pt.       Ernestine Vallecillo    AVS/Discharge Summary is the source of truth; this is a helpful guide for improved communication of patient story            "

## 2019-02-14 NOTE — DISCHARGE INSTRUCTIONS
Wound care DAILY     Comments: Plan of care for wound located on Left  foot: Daily   1.  Wipe leg and foot with bath wipes; Sween to dry skin on bottom of foot and leg   2. Rinse wound with wound spray during dressing changes, pat dry   3. Apply Mepilex Border to cover and pad wound; change Q 3 days, Date   4. Continue with Lymphedema wraps per PT and elevate BLE for edema; Daily          Daily weight call primary/cardiology f more than 1 lb weight gain in a day or 5 lbs in a week  Wear 3L of 02 at rest and with activity.  Monitor blood sugars  Call primary care doctor for persistent symptoms of CHF are present -see attachmement

## 2019-02-15 NOTE — LETTER
[facility]   To:   Quiana RENEE          Please give to facility    From:  Jodie Danielson  [facility]  Care Coordinator [phone]   [facility]     Patient Name:  Evy Song YOB: 1934   Admit date: 2-      *Information Needed:  Please contact me when the patient will discharge (or if they will move to long term care)- include the discharge date, disposition, and main diagnosis   - If the patient is discharged with home care services, please provide the name of the agency  Phone, Fax or Email with information  Thank you, Jodie  P: [phone]  [email or web address]

## 2019-02-15 NOTE — PROGRESS NOTES
Clinic Care Coordination Contact  Care Team Conversations    Received notice of patient being .      Plan- closed to services.    Jodie Danielson,   [facility]  [email or web address]  [phone]        Clinic Care Coordination Contact  Care Coordination Transition Communication    Referral Source: PCP    Clinical Data: CTS to care coordination.  From chart review:  Hospitalist Discharge Summary  [facility]                Date of Admission:                        2019  Date of Discharge:                         2019              Discharge Diagnosis:    Left foot cellulitis  Bilateral lower extremity edema and anasarca  Leukocytosis  Lactic acidosis  Diarrhea  Cirrhosis with gastroesophageal varices  Cognitive impairment  Obstructive sleep apnea  Chronic hypoxic respiratory failure  Pulmonary fibrosis  Restless leg syndrome                 Discharge Disposition:    Discharged to rehabilitation facility          Transition Communication Hand-off for Care Transitions to Next Level of Care Provider     Name: Evy Song  : 1934  MRN #: [id number]  Primary Care Provider: Liz Chapin     Primary Clinic: [facility] [address] [address]     Reason for Hospitalization:  Portal hypertension (H) [K76.6]  Dehydration [E86.0]  Wound infection [T14.8XXA, L08.9]  Severe sepsis (H) [A41.9, R65.20]  Admit Date/Time: 2019  7:09 PM  Discharge Date: 2019  Payor Source: Payor: [employer] / Plan: UCARE MEDICARE / Product Type: HMO /      Readmission Assessment Measure (LYDIA) Risk Score/category: AVERAGE LYDIA                     Reason for Communication Hand-off Referral: Fragility,  This pt has actually been maintaining well with her chronic disease management at home independently- URBAN, Pulm fibrosis. She was identified as having increased care needs on discharge and will transfer to TCU      Discharge Plan: [facility], [place]         Concern for  "non-adherence with plan of care:              Y/N YES noted per chart review pt intermittently take her Lasix because it \"interferes with her Act ivies.\"  She is in need of Lymphedema management as well as chronic disease education and best self management, her daughter is noted to supportive of pt.   Discharge Needs Assessment:      Needs      Most Recent Value   Equipment Currently Used at Home  walker, rolling, cane, straight, grab bar, tub/shower, shower chair   # of Referrals Placed by [facility]  Communication hand-offs to next level of Care Providers          Any outstanding tests or procedures:    Procedures      Future Labs/Procedures     Oxygen - Nasal cannula      Comments:     3 Lpm by nasal cannula to keep O2 sats 92% or greater.           Transition to Facility:              Facility Name: [facility]  Plan: RN/SW Care Coordinator will await notification from facility staff informing RN/SW Care Coordinator of patient's discharge plans/needs. RN/SW Care Coordinator will review chart and outreach to facility staff every 4 weeks and as needed.     Jodie Danielson,   [facility]  [email or web address]  [phone]      "

## 2019-02-18 NOTE — LETTER
2/18/2019        RE: Evy Song  [address] [facility] [address]        [place] [facility]  PRIMARY CARE PROVIDER AND CLINIC:  Scherf, Laura E [address] [facility]  [address] / [facility] [address]  Chief Complaint   Patient presents with     Hospital F/U     [place] Record Number:  [id number]  Place of Service where encounter took place:  [facility] ([facility] [[id number]]    HPI:    Evy Song is a 84 year old  (6/23/1934), history of URBAN and associated cirrhosis, mild REY and no current need for CPAP, pulmonary fibrosis, hypertension, type 2 diabetes, and supraventricular tachycardia admitted on 2/5/2019 with multiple complaints including generalized weakness, poor p.o. intake, diarrhea, increased LE swelling, and left foot erythema.admitted to the above facility from  [facility].  Hospital stay 2/5/19 through 2/14/19.  Left foot cellulitis: completed course of Abx and resolved   LE edema: likely non compliance with PTA lasix, IV lasix to increase in oral aldactone  Diarrhea: no evidence of colitis on CT scan, stool cx negative, improved with imodium  Cirrhosis with esophageal varices: ammonia wnl, no evidence of encephalopathy, no lactulose due to diarrhea, Tx with aldactone  Cognitive impairement: as baseline per family, no evidence of delerium   Pulmonary fibrosis: ongoing hypoxia, has had O2 at home for a couple of weeks  Admitted to this facility for  rehab, medical management and nursing care.  HPI information obtained from: facility chart records, facility staff, patient report and [facility] chart review.  Current issues are: On exam today patient is alert, sitting up in WC, states she has occasional pain in left leg but at time of exam no pain, her legs are wrapped with lymphedema wraps, she denies fever, chills, CP, palpitations, she is on O2 denies SOB at rest, states she has MARY, has O2 at home, states she had a BM this AM, denies abdominal  pain or discomfort, states she slept fair last night.        Last 3 BPs: 124/68, 115/59, 110/52 mmHg  HR Ranges: 68-87 bpm  Admission Weight: 2/15/19: 187.5 lbs  Current Weights: 2/16/19: 184.9 lbs -  2/17/19: 181.8 lbs  BG  AM:  mg/dL  NOON: 105-178 mg/dL  PM:  mg/dL    CODE STATUS/ADVANCE DIRECTIVES DISCUSSION:   CPR/Full code   Patient's living condition: lives alone    ALLERGIES:Monosodium glutamate; Timolol; Bee venom; Benadryl [diphenhydramine]; and Celecoxib  PAST MEDICAL HISTORY:  has a past medical history of Arthritis, Cirrhosis (H) (2016), Diabetes mellitus (H), Essential hypertension, Glaucoma (2002), HLD (hyperlipidemia), Irritable bowel syndrome, Obesity, REY (obstructive sleep apnea), Polyneuropathy, Pulmonary fibrosis (H) (2016), SOB (shortness of breath), SVT (supraventricular tachycardia) (H), and Tachycardia (5/2016).  PAST SURGICAL HISTORY:  has a past surgical history that includes ORIF ankle + foot/Toe surgery (3/1999); APPENDECTOMY (1993); Cataract surgery - both eyes (1991/1996); Laparoscopic cholecystectomy (10/2009); and colonoscopy (2007).  FAMILY HISTORY: family history includes Cancer in her mother; Connective Tissue Disorder in her sister; Diabetes in her father; Heart Disease in her maternal grandfather, maternal grandmother, paternal grandfather, and paternal grandmother.  SOCIAL HISTORY:  reports that  has never smoked. she has never used smokeless tobacco. She reports that she does not drink alcohol or use drugs.    Post Discharge Medication Reconciliation Status: discharge medications reconciled, continue medications without change.  Current Outpatient Medications   Medication Sig Dispense Refill     ACE/ARB/ARNI NOT PRESCRIBED, INTENTIONAL, Please choose reason not prescribed, below       ALBUTEROL 108 (90 BASE) MCG/ACT inhaler INHALE 2 PUFF BY INHALATION ROUTE EVERY 4 - 6 HOURS AS NEEDED  12     ARNUITY ELLIPTA 100 MCG/ACT AEPB inhalation powder Take 100 mcg by mouth  daily  12     ASPIRIN 81 MG OR TABS 1 tab po QD (Once per day) 0 0     AZOPT 1 % OP SUSP 1 DROP BOTH EYES BID (am and hs)  RIMA (Brand only)       EPINEPHrine (EPIPEN 2-LALA) 0.3 MG/0.3ML injection Inject 0.3 mLs (0.3 mg) into the muscle once as needed for anaphylaxis 2 each 1     furosemide (LASIX) 20 MG tablet Take 1 tablet (20 mg) by mouth 2 times daily With breakfast and lunch       hydrocortisone (WESTCORT) 0.2 % cream Apply sparingly to affected area as needed up to twice daily 45 g 0     LYRICA 75 MG capsule Take 150 mg by mouth nightly as needed        metFORMIN (GLUCOPHAGE-XR) 500 MG 24 hr tablet Take 1 tablet (500 mg) by mouth daily (with dinner)       omeprazole (PRILOSEC) 20 MG CR capsule Take 20 mg by mouth daily  1     potassium chloride ER (KLOR-CON) 10 MEQ CR tablet Take 1 tablet (10 mEq) by mouth 2 times daily With breakfast and lunch 60 tablet 0     rOPINIRole (REQUIP) 0.5 MG tablet Take 1 tablet (0.5 mg) by mouth every 24 hours 30 tablet 0     rOPINIRole (REQUIP) 1 MG tablet Take 1 tablet (1 mg) by mouth At Bedtime 30 tablet 0     simvastatin (ZOCOR) 20 MG tablet Take 1 tablet (20 mg) by mouth At Bedtime 90 tablet 1     spironolactone (ALDACTONE) 50 MG tablet Take 1 tablet (50 mg) by mouth 2 times daily 30 tablet 0     XALATAN 0.005 % OP SOLN 1 drop  bot eyes at hs 0 0       ROS:  10 point ROS of systems including Constitutional, Eyes, Respiratory, Cardiovascular, Gastroenterology, Genitourinary, Integumentary, Musculoskeletal, Psychiatric were all negative except for pertinent positives noted in my HPI.    Exam:  /68   Pulse 87   Temp 98  F (36.7  C)   Resp 18   Wt 82.5 kg (181 lb 12.8 oz)   LMP  (LMP Unknown)   SpO2 96%   BMI 31.21 kg/m     GENERAL APPEARANCE:  Alert, in no distress  ENT:  Mouth and posterior oropharynx normal, moist mucous membranes, [place]  EYES:  EOM, conjunctivae, lids, pupils and irises normal, PERRL  RESP:  respiratory effort and palpation of chest normal, lungs  clear to auscultation , no respiratory distress  CV:  Palpation and auscultation of heart done , regular rate and rhythm, no murmur, rub, or gallop, peripheral edema 1+ in LE bilaterally  ABDOMEN:  normal bowel sounds, soft, nontender, no hepatosplenomegaly or other masses  M/S:   Examination of:   right upper extremity, left upper extremity, right lower extremity and left lower extremity  Inspection, ROM, stability and muscle strength normal and generalized weakness  SKIN:  Inspection of skin and subcutaneous tissue baseline, did not visualize left leg, has lymphedema wrap on  NEURO:   Cranial nerves 2-12 are normal tested and grossly at patient's baseline, speech WNL  PSYCH:  affect and mood normal    Lab/Diagnostic data:  CBC RESULTS:   Recent Labs   Lab Test 02/10/19  0901 02/09/19  0612   WBC 9.7 6.5   RBC 3.81 3.67*   HGB 10.9* 10.3*   HCT 34.0* 32.9*   MCV 89 90   MCH 28.6 28.1   MCHC 32.1 31.3*   RDW 24.9* 25.2*   PLT 96* 96*       Last Basic Metabolic Panel:  Recent Labs   Lab Test 02/13/19  0656 02/12/19  0714    139   POTASSIUM 3.7 3.5   CHLORIDE 110* 108   MEGHAN 8.8 8.2*   CO2 25 25   BUN 30 31*   CR 1.00 1.09*   * 100*       Liver Function Studies -   Recent Labs   Lab Test 02/12/19  0714 02/09/19  0612   PROTTOTAL 4.3* 4.2*   ALBUMIN 2.6* 1.8*   BILITOTAL 2.6* 2.3*   ALKPHOS 71 113   AST 31 62*   ALT 7 13       Lab Results   Component Value Date    A1C 5.7 02/05/2019    A1C 5.5 01/28/2019       ASSESSMENT/PLAN:  Lymphedema of both lower extremities  Physical deconditioning  Acute/ongoing: daily weights, lymphedema wraps, lasix 20mg BID and aldactone 50mg BID, BMP twice weekly    Cirrhosis, non-alcoholic (H)  Ongoing: with esophageal varices: Hgb twice weekly, continue aldactone as above    Diarrhea, unspecified type  Acute/ongoing: imodium 2mg q 6 hours prn    IPF (idiopathic pulmonary fibrosis) (H)  Chronic/ongoing: monitor SaO2 at rest and with activity, wean off O2 to keep SAO2 >  90%    Essential hypertension, benign  Chronic/ongoing: vitals daily and prn, BMP twice weekly, continue lasix and aldactone, metoprolol dc during hospitalization       Orders:  BMP and Hgb twice weekly  Imodium 2mg q 6 hours prn         Electronically signed by:  Tonya Lynn Haase, APRN CNP                    Sincerely,        Tonya Lynn Haase, APRN CNP

## 2019-02-18 NOTE — PROGRESS NOTES
[place] [facility]  PRIMARY CARE PROVIDER AND CLINIC:  Scherf, Laura E [address] [place] [address] / [facility] [address]  Chief Complaint   Patient presents with     Hospital F/U     [place] [facility] Record Number:  [id number]  Place of Service where encounter took place:  [facility] ([facility] [[id number]]    HPI:    Evy Song is a 84 year old  (6/23/1934), history of URBAN and associated cirrhosis, mild REY and no current need for CPAP, pulmonary fibrosis, hypertension, type 2 diabetes, and supraventricular tachycardia admitted on 2/5/2019 with multiple complaints including generalized weakness, poor p.o. intake, diarrhea, increased LE swelling, and left foot erythema.admitted to the above facility from  [facility].  Hospital stay 2/5/19 through 2/14/19.  Left foot cellulitis: completed course of Abx and resolved   LE edema: likely non compliance with PTA lasix, IV lasix to increase in oral aldactone  Diarrhea: no evidence of colitis on CT scan, stool cx negative, improved with imodium  Cirrhosis with esophageal varices: ammonia wnl, no evidence of encephalopathy, no lactulose due to diarrhea, Tx with aldactone  Cognitive impairement: as baseline per family, no evidence of delerium   Pulmonary fibrosis: ongoing hypoxia, has had O2 at home for a couple of weeks  Admitted to this facility for  rehab, medical management and nursing care.  HPI information obtained from: facility chart records, facility staff, patient report and [facility] chart review.  Current issues are: On exam today patient is alert, sitting up in WC, states she has occasional pain in left leg but at time of exam no pain, her legs are wrapped with lymphedema wraps, she denies fever, chills, CP, palpitations, she is on O2 denies SOB at rest, states she has MARY, has O2 at home, states she had a BM this AM, denies abdominal pain or discomfort, states she slept fair last night.        Last 3 BPs: 124/68, 115/59,  110/52 mmHg  HR Ranges: 68-87 bpm  Admission Weight: 2/15/19: 187.5 lbs  Current Weights: 2/16/19: 184.9 lbs -  2/17/19: 181.8 lbs  BG  AM:  mg/dL  NOON: 105-178 mg/dL  PM:  mg/dL    CODE STATUS/ADVANCE DIRECTIVES DISCUSSION:   CPR/Full code   Patient's living condition: lives alone    ALLERGIES:Monosodium glutamate; Timolol; Bee venom; Benadryl [diphenhydramine]; and Celecoxib  PAST MEDICAL HISTORY:  has a past medical history of Arthritis, Cirrhosis (H) (2016), Diabetes mellitus (H), Essential hypertension, Glaucoma (2002), HLD (hyperlipidemia), Irritable bowel syndrome, Obesity, REY (obstructive sleep apnea), Polyneuropathy, Pulmonary fibrosis (H) (2016), SOB (shortness of breath), SVT (supraventricular tachycardia) (H), and Tachycardia (5/2016).  PAST SURGICAL HISTORY:  has a past surgical history that includes ORIF ankle + foot/Toe surgery (3/1999); APPENDECTOMY (1993); Cataract surgery - both eyes (1991/1996); Laparoscopic cholecystectomy (10/2009); and colonoscopy (2007).  FAMILY HISTORY: family history includes Cancer in her mother; Connective Tissue Disorder in her sister; Diabetes in her father; Heart Disease in her maternal grandfather, maternal grandmother, paternal grandfather, and paternal grandmother.  SOCIAL HISTORY:  reports that  has never smoked. she has never used smokeless tobacco. She reports that she does not drink alcohol or use drugs.    Post Discharge Medication Reconciliation Status: discharge medications reconciled, continue medications without change.  Current Outpatient Medications   Medication Sig Dispense Refill     ACE/ARB/ARNI NOT PRESCRIBED, INTENTIONAL, Please choose reason not prescribed, below       ALBUTEROL 108 (90 BASE) MCG/ACT inhaler INHALE 2 PUFF BY INHALATION ROUTE EVERY 4 - 6 HOURS AS NEEDED  12     ARNUITY ELLIPTA 100 MCG/ACT AEPB inhalation powder Take 100 mcg by mouth daily  12     ASPIRIN 81 MG OR TABS 1 tab po QD (Once per day) 0 0     AZOPT 1 % OP SUSP  1 DROP BOTH EYES BID (am and hs)  RIMA (Brand only)       EPINEPHrine (EPIPEN 2-LALA) 0.3 MG/0.3ML injection Inject 0.3 mLs (0.3 mg) into the muscle once as needed for anaphylaxis 2 each 1     furosemide (LASIX) 20 MG tablet Take 1 tablet (20 mg) by mouth 2 times daily With breakfast and lunch       hydrocortisone (WESTCORT) 0.2 % cream Apply sparingly to affected area as needed up to twice daily 45 g 0     LYRICA 75 MG capsule Take 150 mg by mouth nightly as needed        metFORMIN (GLUCOPHAGE-XR) 500 MG 24 hr tablet Take 1 tablet (500 mg) by mouth daily (with dinner)       omeprazole (PRILOSEC) 20 MG CR capsule Take 20 mg by mouth daily  1     potassium chloride ER (KLOR-CON) 10 MEQ CR tablet Take 1 tablet (10 mEq) by mouth 2 times daily With breakfast and lunch 60 tablet 0     rOPINIRole (REQUIP) 0.5 MG tablet Take 1 tablet (0.5 mg) by mouth every 24 hours 30 tablet 0     rOPINIRole (REQUIP) 1 MG tablet Take 1 tablet (1 mg) by mouth At Bedtime 30 tablet 0     simvastatin (ZOCOR) 20 MG tablet Take 1 tablet (20 mg) by mouth At Bedtime 90 tablet 1     spironolactone (ALDACTONE) 50 MG tablet Take 1 tablet (50 mg) by mouth 2 times daily 30 tablet 0     XALATAN 0.005 % OP SOLN 1 drop  bot eyes at hs 0 0       ROS:  10 point ROS of systems including Constitutional, Eyes, Respiratory, Cardiovascular, Gastroenterology, Genitourinary, Integumentary, Musculoskeletal, Psychiatric were all negative except for pertinent positives noted in my HPI.    Exam:  /68   Pulse 87   Temp 98  F (36.7  C)   Resp 18   Wt 82.5 kg (181 lb 12.8 oz)   LMP  (LMP Unknown)   SpO2 96%   BMI 31.21 kg/m    GENERAL APPEARANCE:  Alert, in no distress  ENT:  Mouth and posterior oropharynx normal, moist mucous membranes, [place]  EYES:  EOM, conjunctivae, lids, pupils and irises normal, PERRL  RESP:  respiratory effort and palpation of chest normal, lungs clear to auscultation , no respiratory distress  CV:  Palpation and auscultation of heart  done , regular rate and rhythm, no murmur, rub, or gallop, peripheral edema 1+ in LE bilaterally  ABDOMEN:  normal bowel sounds, soft, nontender, no hepatosplenomegaly or other masses  M/S:   Examination of:   right upper extremity, left upper extremity, right lower extremity and left lower extremity  Inspection, ROM, stability and muscle strength normal and generalized weakness  SKIN:  Inspection of skin and subcutaneous tissue baseline, did not visualize left leg, has lymphedema wrap on  NEURO:   Cranial nerves 2-12 are normal tested and grossly at patient's baseline, speech WNL  PSYCH:  affect and mood normal    Lab/Diagnostic data:  CBC RESULTS:   Recent Labs   Lab Test 02/10/19  0901 02/09/19  0612   WBC 9.7 6.5   RBC 3.81 3.67*   HGB 10.9* 10.3*   HCT 34.0* 32.9*   MCV 89 90   MCH 28.6 28.1   MCHC 32.1 31.3*   RDW 24.9* 25.2*   PLT 96* 96*       Last Basic Metabolic Panel:  Recent Labs   Lab Test 02/13/19  0656 02/12/19  0714    139   POTASSIUM 3.7 3.5   CHLORIDE 110* 108   MEGHAN 8.8 8.2*   CO2 25 25   BUN 30 31*   CR 1.00 1.09*   * 100*       Liver Function Studies -   Recent Labs   Lab Test 02/12/19  0714 02/09/19  0612   PROTTOTAL 4.3* 4.2*   ALBUMIN 2.6* 1.8*   BILITOTAL 2.6* 2.3*   ALKPHOS 71 113   AST 31 62*   ALT 7 13       Lab Results   Component Value Date    A1C 5.7 02/05/2019    A1C 5.5 01/28/2019       ASSESSMENT/PLAN:  Lymphedema of both lower extremities  Physical deconditioning  Acute/ongoing: daily weights, lymphedema wraps, lasix 20mg BID and aldactone 50mg BID, BMP twice weekly    Cirrhosis, non-alcoholic (H)  Ongoing: with esophageal varices: Hgb twice weekly, continue aldactone as above    Diarrhea, unspecified type  Acute/ongoing: imodium 2mg q 6 hours prn    IPF (idiopathic pulmonary fibrosis) (H)  Chronic/ongoing: monitor SaO2 at rest and with activity, wean off O2 to keep SAO2 > 90%    Essential hypertension, benign  Chronic/ongoing: vitals daily and prn, BMP twice weekly,  continue lasix and aldactone, metoprolol dc during hospitalization       Orders:  BMP and Hgb twice weekly  Imodium 2mg q 6 hours prn         Electronically signed by:  Tonya Lynn Haase, APRN CNP

## 2019-02-20 PROBLEM — A41.9 SEPSIS (H): Status: ACTIVE | Noted: 2019-01-01

## 2019-02-20 NOTE — PROGRESS NOTES
RADIOLOGY PROCEDURE NOTE  Patient name: Evy Song  MRN: [id number]  : 1934    Pre-procedure diagnosis: Ascites  Post-procedure diagnosis: Same    Procedure Date/Time: 2019  11:06 AM  Procedure: Paracentesis  Estimated blood loss: None  Specimen(s) collected with description: ascitic fluid  The patient tolerated the procedure well with no immediate complications.  Significant findings:none    See imaging dictation for procedural details.    Provider name: Isaac Barrientos  Assistant(s):None

## 2019-02-20 NOTE — PROGRESS NOTES
"SPIRITUAL HEALTH SERVICES  Spiritual Assessment Progress Note  [facility] ICU   met with pt's daughter as she tearfully talked about pt's condition and wondering if she will survive.    Daughter is an only child and pt has a sister who lives in [place] who is unable to get here due to weather.    Daughter is wondering if pt has a HCD at home to guide her re: decisions and POC.  Daughter's  will be going to pt's home to see if he can find it.    Pt has more recently started to attend [employer] since the death of her .  Daughter is an [employer] and appreciates prayer.    Daughter stated that she is feeling overwhelmed right now.  She did recall that her mom stated \"I'm tired of this!\"  Daughter does think that pt would want to be alive for her grandson's college graduation this spring.      listened to daughter's thoughts and feelings.  Provided emotional support and prayer.    SH continues to be available for support as needed.                                                                                                                                          Aruna Shelley M.Div., [facility]  Staff    Pager [phone]  "

## 2019-02-20 NOTE — PROGRESS NOTES
ICU Multi-Disciplinary Note  Patient condition reviewed and discussed while on multidisciplinary rounds today. Evy Song is a 84 year old female with PMH pulmonary fibrosis and cirrhosis who presented from her SNF confused and with multiple episodes of emesis and diarrhea. She had sudden onset of shortness of breath, and increasing her oxygen from her baseline 2-3L did not help. She was found to be tachycardic with elevated lactate. Her lactate was 16 on admission and cultures were drawn; she received 1,250 mL crystalloid and vancomycin/zosyn in the ED. Her lactate has since gone up to 17.0.  She was evaluated with bedside ultrasound that showed hyperdynamic heart with small IVC (1-1.5 cm). She had B-lines throughout her lungs with no pleural effusions (difficult to interpret given her chronic pulmonary fibrosis). Ascites noted, her bladder has urine in it. She is currently on 8L oxymask and speaking in 2-3 word sentences.    Please note these minor interventions that were initiated:  1. Agree with additional liter of fluid.  2. Given history of diarrhea and emesis have concern for norovirus, enteric panel pending. Will add C diff (don't see that it was sent in the ED) and empirically place in enteric precautions.  3. Will continue to monitor with you.    The Critical Care service will continue to follow peripherally while the patient is within the ICU. We are readily available should issues arise. Please feel free to contact us for critical care issues with which we may be of assistance. For all other concerns, please contact primary service first.     DARRYL Cadena

## 2019-02-20 NOTE — ED PROVIDER NOTES
History     Chief Complaint:  Shortness of Breath      HPI   History limited secondary to the patient's respiratory distress. History supplemented by EMS report.     Evy Song is a 84 year old female with a history of hypertension, hyperlipidemia, type II diabetes, SVT, pulmonary fibrosis, cellulitis, and cirrhosis who presents to the ED via EMS for evaluation of shortness of breath. Per EMS, the patient currently resides at a skilled nursing facility secondary to complications from cellulitis in her right leg. Today, staff note that the patient was confused from baseline and experienced multiple episodes of emesis and diarrhea. Tonight she also experienced the sudden onset of shortness of breath. Staff tried increasing her baseline oxygen from 2L to 3L without improvement, and thus EMS was called. EMS reportedly found the patient to be hypoxic to 82% upon arrival at the scene, and they placed her on 15L nasal cannula with improvement to 93%. EMS reportedly tried to place her on CPAP but she would not tolerate this. The patient s respiratory rate was in the high 30s, with systolic blood pressure in the 100s en route to the ED. Initial glucose was in the 50s, and thus EMS administered 12.5 g D50. She was also given 4 mg of Zofran and 500 mL IV fluids. EMS reports that she was acting appropriately, oriented, and making jokes but responding with 2-3 word sentences. Here in the ED, the patient endorses having leg pain but denies any other pains including chest pain or abdominal pain. She states that her shortness of breath is exacerbated by lying flat. The patient additionally presents with bilateral leg swelling, although she denies that this is new. Per the patient s daughter, she has no history or atrial fibrillation, pericardial effusion, or other cardiac history aside from hypertension or hyperlipidemia.  Liver disease 2/2 non-alcoholic fatty liver.  Lymphedema has not seemed to improve since  discharge.    Allergies:  Monosodium Glutamate  Timolol  Bee Venom  Benadryl [Diphenhydramine]  Celecoxib     Medications:    Albuterol  Arnuity ellipta  Aspirin  Azopt  EpiPen  Lasix  Hydrocortisone cream  Lyrica  Metformin  Klor-Con  Omeprazole  Requip  Simvastatin  Aldactone  Xalatan      Past Medical History:    Hypertension  Glaucoma  Hyperlipidemia  Anxiety  Colonic polyps  Obesity  Type II diabetes  Peripheral neuropathy  TMJ  RLS  Symptomatic tachycardia  Non-alcoholic cirrhosis  IPF  Cellulitis  IBS  SVT  Pulmonary fibrosis    Past Surgical History:    Appendectomy  Cataract surgery  Colonoscopy  Laparoscopic cholecystectomy  Ankle and foot open reduction with internal fixation    Family History:    Liver cancer  Fibromyalgia    Social History:  Negative for tobacco use.  Negative for alcohol use.  Marital Status:   [5]  Currently resides at a skilled nursing facility.     Review of Systems   Unable to perform ROS: Acuity of condition   Respiratory: Positive for shortness of breath.    Cardiovascular: Positive for leg swelling. Negative for chest pain.   Gastrointestinal: Positive for diarrhea, nausea and vomiting. Negative for abdominal pain.   Musculoskeletal: Positive for myalgias (leg).   Psychiatric/Behavioral: Positive for confusion.     Physical Exam     Patient Vitals for the past 24 hrs:   BP Pulse Heart Rate Resp SpO2   02/20/19 0530 -- -- 120 23 99 %   02/20/19 0527 -- -- -- -- 99 %   02/20/19 0526 -- -- 121 19 99 %   02/20/19 0525 -- -- 117 21 99 %   02/20/19 0524 -- -- 113 24 97 %   02/20/19 0523 109/57 115 120 24 98 %   02/20/19 0522 -- -- 118 (!) 31 99 %   02/20/19 0521 -- -- 117 25 97 %   02/20/19 0520 -- -- 122 24 96 %   02/20/19 0519 -- -- 123 28 91 %   02/20/19 0518 -- -- 123 25 91 %   02/20/19 0517 -- -- 123 (!) 54 91 %   02/20/19 0516 -- -- 123 19 92 %   02/20/19 0515 (!) 85/51 123 124 (!) 43 91 %   02/20/19 0445 114/55 126 126 29 97 %   02/20/19 0430 100/52 126 126 27 96 %    02/20/19 0415 108/42 125 126 27 96 %   02/20/19 0400 98/53 126 125 27 96 %   02/20/19 0204 116/58 133 -- 30 --   02/20/19 0201 -- -- -- -- 94 %        Physical Exam  Eyes:  Sclera white; Pupils are equal and round  ENT:    External ears and nares normal  CV:  Rate as above with regular rhythm     4+ edema BLE to lower abdomen, wraps on legs  Resp:  Breath sounds clear and equal bilaterally, no wheeze or crackles    Tachypneic but providing detailed history as able  GI:  Abdomen is soft, non-tender, non-distended    No rebound tenderness or peritoneal features  MS:  Moves all extremities  Skin:  Warm and dry  Neuro:  Speech is normal and fluent.     Emergency Department Course   ECG:  Indication: Shortness of breath  Time: 205  Vent. Rate 140 bpm. AL interval 196. QRS duration 92. QT/QTc 310/473. P-R-T axis -17 -47 20.  Regular rhythm, SVT. Left anterior fascicular block. Anterolateral infarct, age undetermined. Abnormal ECG. Similar when compared to EKG dated 7/17/18, previous heart rate 103. Read time: 0208    Time: 0256  Vent. Rate 125 bpm. AL interval *. QRS duration 94. QT/QTc 288/415. P-R-T axis * -37 77.  Accelerated junctional rhythm - unclear with baseline. Left axis deviation. Anterior infarct, age undetermined. Abnormal ECG. Read time: 0258     Imaging:  Radiographic findings were communicated with the patient who voiced understanding of the findings.    CT Chest/Abdomen/Pelvis w/ IV contrast:   1. Cirrhotic liver and evidence of portal hypertension as demonstrated  by a moderate amount of ascites and upper abdominal and distal  esophageal varices.  2. No acute chest abnormality.  3. Thick-walled small bowel loops are likely secondary to chronic  liver disease. Enteritis could also have this appearance. No bowel  Obstruction, as per radiology.    XR Chest Port 1 View:   Mild left basilar infiltrate, as per radiology.     POC US Echo Limited:  Findings below.    Laboratory:  CBC: WBC: 5.6, HGB: 10.5 (L),  PLT: 87 (L)  CMP: Glucose 133 (H), Creatinine 1.10 (H), Bilirubin 5.5 (H), Albumin 2.7 (L), Protein total 4.6 (L), AST 78 (H),  (H), Cl 114 (H), CO2 8 (LL), Anion 25 (H), GFR 46 (L), o/w WNL    UA with Microscopic: Specific gravity 1.050 (H), Protein albumin 10 (A), Ketones 5 (A), Mucous present (A), o/w WNL  Urine culture: pending    Enteric Bacteria and Virus Panel Stool: pending  Clostridium difficile toxin B PCR: pending     Blood cultures (X2): pending    Influenza A/B antigen: Negative    D dimer: 7.9 (H)    0326 Lactic acid: 16.0 (HH)     0200 Troponin: 0.044    0201 Glucose by meter: 137 (H)     BNP: 3758 (H)    Creatinine POCT: Creatinine 1.1 (H), GFR 47 (L)    0200 VBG: pH 7.30 (L), pCO2 17 (LL), pO2 89 (H), Bicarb 8 (LL), o/w WNL  0326 VBG: pH 7.19 (LL), pCO2 27 (L), Bicarb 10 (LL), o/w WNL    TSH with free T4 reflex: 2.18    Magnesium: 1.3 (L)    Procedures:      POC US ECHO LIMITED   Final Result   Limited Bedside Thoracic and Cardiac Ultrasound      Performed by: Dr. De La Rosa   Indication: Tachycardia, hypoxia   Body area(s) imaged: Bilateral lungs, parasternal cardiac, 4 chamber   Findings: Scant comet tails on left, normal on right, normal sliding signs bilaterally.  Question pericardial effusion on first cardiac view but this was not seen on other views.  Hyperdynamic heart.   Impression: No pneumothorax.  Possible beginning of pulmonary edema or other interstitial fluid on left.  Hyperdynamic contractility.   Images archived to PACS.     Interventions:  0228  mL IV   Zofran, 4 mg, IV injection   0518 Reglan 5 mg IV  Please see MAR for full list of medications administered in the ED.     Emergency Department Course:    0154 The patient arrived to the ED and EMS report was obtained.  0157  I performed an exam of the patient as documented above. IV inserted. Blood drawn and sent to the lab for further testing, results above.   0158  Portable chest x-ray ordered  0202  Ultrasound  performed  0205 EKG obtained in the ED, see results above.    0208 Reexamined the patient  0211 Portable chest x-ray obtained  0213 The patient's daughter had presented at bedside and I updated her as to the patient's presentation here.   0214 The patient was placed on BiPAP. Oxygen saturations at 95%.  0216 I performed a bedside cardiac and thoracic ultrasound at this time.   0219 The patient's lactic acid was called to me.  0223 Patient felt as though she would vomit and so BiPAP was temporarily removed.  0224 BiPAP replaced.  0228 NS and Zofran administered at this time.  0230 Epic went into scheduled downtime.  0256 Repeat EKG obtained in the ED, see results above.    0231 I updated the patient and her daughter regarding her blood work  0310 I reevaluated the patient and provided an update in regards to her ED course.    0324 I viewed the patient s CT results at this time.  0335 I rechecked the patient and updated the family.  Bedside US of abdomen.  0353 Epic came back online at this time.  0355 I consulted with Dr. Ventura of the hospitalist services. They are in agreement to accept the patient for admission.  0506 I reevaluated the patient and provided an update in regards to her ED course.  /57   Pulse 115   Resp 23   LMP  (LMP Unknown)   SpO2 99%      Findings and plan explained to the Patient who consents to admission. Discussed the patient with Dr. Ventura, who will admit the patient to an ICU bed for further monitoring, evaluation, and treatment.       Impression & Plan    Medical Decision Making:  Evy Song is a 84 year old female is here for evaluation of acute onset of shortness of breath and hypoxia in the setting of recent vomiting and diarrhea. Although her sats are maintaining at 94% with the use of a mask, she is working hard to breathe and she was switched to BiPAP, as I hope this will prevent her from fatiguing.  EKG shows an undetermined underlying rhythm. Her rate is very  frequently at 135 in the room, which is suspicious for flutter, but even a repeat EKG did not show the typical subtle signs of a 2:1 flutter.  Rhythm remains uncertain on repeat but is narrow and therefore supraventricular.  Bloodwork was notable for a severely elevated lactic at 16, suggesting that she is not perfusing well. Bedside ultrasound demonstrates a hyperdynamic heart, and therefore despite her lymphedema she was given additional fluids, as I feel this will help her perfusion. WBC came back normal, but with her fever and tachycardia she does meet SIRS criteria. Broad-spectrum antibiotics were initiated due to concern for sepsis. Portable XR w/LLL findings but CT chest/abdomen/pelvis w/o pneumonia. I went to CT to review the scan on my own prior to receiving preliminary read during down time. I did not see any obvious large pulmonary emboli, pneumonthorax, or pleural effusion. The patient is noted to have ascites.  Quick bedside US revealed no safe fluid pocket to consider diagnostic paracentesis.  Does not have the tenderness characteristic of this but without other clear etiology of lactic acidosis, this remains on the differential.  Ischemic gut possible but there is minimal cramping pain and certainly not out of proportion.  Repeat lactic and VBG were obtained without improvement.  Patient rechecked and respiratory effort has improved, talking easier, and smiling.  No indication to intubate at this time but status remains guarded.  Significant elevation of lactic acid very concerning that underlying pathology may result in death. The patient is full code. I have discussed the severity of her illness with her and her daughter, who both understand that if she does require intubation it may be difficult to extubate her due to her underlying pulmonary fibrosis.    Critical Care time:  was 90 minutes for this patient excluding procedures.    Diagnosis:    ICD-10-CM    1. Acute respiratory failure with  hypoxia (H) J96.01 UA with Microscopic     Urine Culture   2. Supraventricular tachycardia (H) I47.1    3. Metabolic acidosis E87.2    4. Fever, unspecified fever cause R50.9    5. Vomiting and diarrhea R11.10     R19.7        Disposition:  Admitted to Dr. Ventura    Scribe Disclosure:  I, Lachelle May, am serving as a scribe on 2/20/2019 at 2:07 AM to personally document services performed by Adrianne De La Rosa MD based on my observations and the provider's statements to me.      Lachelle May  2/20/2019    EMERGENCY DEPARTMENT       Adrianne De La Rosa MD  02/20/19 2050       Adrianne De La Rosa MD  02/20/19 2054

## 2019-02-20 NOTE — PHARMACY-ADMISSION MEDICATION HISTORY
Admission medication history interview status for the 2/20/2019  admission is complete. See EPIC admission navigator for prior to admission medications     Medication history source reliability:Good    Actions taken by pharmacist (provider contacted, etc):reviewed MAR sent by NH facility     Additional medication history information not noted on PTA med list :None    Medication reconciliation/reorder completed by provider prior to medication history? yes    Time spent in this activity: 15 mins    Prior to Admission medications    Medication Sig Last Dose Taking? Auth Provider   acetaminophen (TYLENOL) 325 MG tablet Take 650 mg by mouth every 4 hours as needed for mild pain 2/17/2019 Yes Unknown, Entered By History   ARNUITY ELLIPTA 100 MCG/ACT AEPB inhalation powder Take 100 mcg by mouth daily 2/19/2019 at Unknown time Yes Reported, Patient   aspirin 81 MG EC tablet Take 81 mg by mouth daily 2/19/2019 at Unknown time Yes Unknown, Entered By History   AZOPT 1 % OP SUSP 1 DROP BOTH EYES BID (am and hs)  RIMA (Brand only) 2/19/2019 at Unknown time Yes Liz hCapin MD   EPINEPHrine (EPIPEN 2-LALA) 0.3 MG/0.3ML injection Inject 0.3 mLs (0.3 mg) into the muscle once as needed for anaphylaxis prn Yes Liz Chapin MD   furosemide (LASIX) 20 MG tablet Take 1 tablet (20 mg) by mouth 2 times daily With breakfast and lunch 2/19/2019 at Unknown time Yes Liz Chapin MD   hydrocortisone (WESTCORT) 0.2 % cream Apply sparingly to affected area as needed up to twice daily prn Yes Liz Chapin MD   latanoprost (XALATAN) 0.005 % ophthalmic solution Place 1 drop into both eyes At Bedtime 2/19/2019 at Unknown time Yes Unknown, Entered By History   LYRICA 75 MG capsule Take 150 mg by mouth nightly as needed  prn Yes Reported, Patient   metFORMIN (GLUCOPHAGE-XR) 500 MG 24 hr tablet Take 1 tablet (500 mg) by mouth daily (with dinner) 2/19/2019 at Unknown time Yes Liz Chapin MD   omeprazole (PRILOSEC) 20 MG CR capsule  Take 20 mg by mouth daily 2/19/2019 at Unknown time Yes Reported, Patient   potassium chloride ER (KLOR-CON) 10 MEQ CR tablet Take 1 tablet (10 mEq) by mouth 2 times daily With breakfast and lunch 2/19/2019 at Unknown time Yes Liz Chapin MD   rOPINIRole (REQUIP) 0.5 MG tablet Take 0.5 mg by mouth every 24 hours At 1300 2/19/2019 at Unknown time Yes Unknown, Entered By History   rOPINIRole (REQUIP) 1 MG tablet Take 1 tablet (1 mg) by mouth At Bedtime 2/19/2019 at Unknown time Yes Charles Patino DO   simvastatin (ZOCOR) 20 MG tablet Take 1 tablet (20 mg) by mouth At Bedtime 2/19/2019 at Unknown time Yes Lzi Chapin MD   spironolactone (ALDACTONE) 50 MG tablet Take 1 tablet (50 mg) by mouth 2 times daily 2/19/2019 at Unknown time Yes Charles Patino DO   ACE/ARB/ARNI NOT PRESCRIBED, INTENTIONAL, Please choose reason not prescribed, below   Liz Chapin MD   loperamide (IMODIUM) 2 MG capsule Take 2 mg by mouth 4 times daily as needed for diarrhea prn at Unknown time  Unknown, Entered By History       Jenifer Presley, PharmD

## 2019-02-20 NOTE — PROGRESS NOTES
general surgery brief note    Patient with elevated lactic acid, ascites, cirrhosis, who has had on/off diarrhea for several months.  She came in with SOB.  On exam she is awake, alert, cooperative.  Currently on CPAP, but able to communicate.  Her abdomen is soft, no peritoneal signs noted.  paracentesis done - pending    Discussed with ICU team - all agree that no clear indication for abdominal exploration at this time.  If so become needed then INR would need to be normalized.

## 2019-02-20 NOTE — PROCEDURES
Endotracheal Intubation:  Indication: Metabolic acidosis, shock    Emergent procedure  Patient on levophed.  Patient positioned at 30 degrees  Preoxygenated with 6 L nasal cannula and bag mask over the face.    RSI performed with 20 mg etomidate and 50 mg rocuronium.   Glide scope was used with MAC 4 blade.  Good visualization of vocal cords and 8.0 ET tube placed on first attempt.  Color change and equal breath sounds.     No complications    Ryan Armstrong MD

## 2019-02-20 NOTE — PROGRESS NOTES
Critical Care Progress Note      02/20/2019    Name: Evy Song MRN#: [id number]   Age: 84 year old YOB: 1934     Hsptl Day# 0  ICU DAY # 0    MV DAY # 0             Problem List:   Active Problems:    Sepsis (H)  Cirrhosis  Acute Renal Failure  Lactic Acidosis          Summary/Hospital Course:   84 year old female with a history of cirrhosis and pulmonary fibrosis presents with history of diarrhea, abdominal pain, shortness of breath. Found to have a lactic acidosis, and admitted to the ICU.  She was placed on NIPPV, started on Vanc/zosyn and culture.  Had a paracentesis performed on 2/20.         Assessment and plan :     Evy Song IS a 84 year old female admitted on 2/20/2019 for severe sepsis.   I have personally reviewed the daily labs, imaging studies, cultures and discussed the case with referring physician and consulting physicians.     My assessment and plan by system for this patient is as follows:    Neurology/Psychiatry:   1. Delirium - suspect related to infection in the setting of cirrhosis       Cardiovascular:   1. Tachycardia - from infection.  MAP goal is 65, and she is at that right now.  Will give her albumin.  Checking ECHO    Pulmonary/Ventilator Management:   1.  Metabolic Acidosis - high minute ventilation.  On NIPPV to help with work of breathing. Suspect will need to be intubated today.   2. Pulmonary fibrosis - sometimes uses oxygen at home.  Etiology of fibrosis no clear to me right now.    3. Acute on chronic hypoxemic respiratory failure - on NIPPV    GI and Nutrition :   1.  Cirrhosis - unclear cause.  S/p paracentesis. Awaiting cell count and culture    Renal/Fluids/Electrolytes:   1. Acute renal failure - suspect ATN or pre renal  2  Acid/base status - metabolic acidosis. Lactate significantly elevated. Decent respiratory compensation at this point.   Likely from infection.  Has normal peripheral perfusion on my exam. Concerned for gut ischemia. Low chance for  metformin related lactic acidosis.     3. Hypernatremia - will monitor.       Infectious Disease:   1. Suspected intra-abdominal infection. SBP vs secondary peritonitis.  ON vanco/zosyn and PO vanc for possible C diff.     Endocrine:   1. Hypoglycemia - from infection and cirrhosis. D10 infusion    Hematology/Oncology:   1. Leukopenia - from sepsis  2. Coagulopathy - cirrhosis and infection. Will check fibrinogen, LDH, haptoglobin. May be difficult to interpret in setting of cirrhosis though      IV/Access:   1. Venous access - central line placed  2. Arterial access - will plan on placing arterial line    Plan  - central access required and necessary      ICU Prophylaxis:   1. DVT: mechanical prophylaxis - INR is 3.3  2. VAP: N/a  3. Stress Ulcer: PPI  4. Restraints: none at the moment.   5. Wound care - per unit routine   6. Feeding - NPO  7. Family Update: yes, at the beside  8. Disposition - critically ill        Key goals for next 24 hours:   1. Follow up paracentesis  2. MAP goal 65  3. Surgery consultation         Interim History:     Delirious.  Persistent lactate elevation.  + abdominal pain.          Key Medications:       albumin human  25 g Intravenous Once     lactated ringers  500 mL Intravenous Once     pantoprazole (PROTONIX) IV  40 mg Intravenous Daily with breakfast     piperacillin-tazobactam  3.375 g Intravenous Q6H     vancomycin  125 mg Oral 4x Daily     vancomycin (VANCOCIN) IV  1,500 mg Intravenous Q24H       dextrose 30 mL/hr at 02/20/19 1237     norepinephrine       sodium bicarbonate 8 mEq/hr (02/20/19 1242)               Physical Examination:   Temp:  [98.2  F (36.8  C)-99.1  F (37.3  C)] 98.2  F (36.8  C)  Pulse:  [100-133] 114  Heart Rate:  [106-129] 115  Resp:  [18-54] 19  BP: ()/(41-65) 97/47  FiO2 (%):  [50 %] 50 %  SpO2:  [89 %-99 %] 97 %    Intake/Output Summary (Last 24 hours) at 2/20/2019 1302  Last data filed at 2/20/2019 1100  Gross per 24 hour   Intake 220 ml   Output --    Net 220 ml     Wt Readings from Last 4 Encounters:   02/17/19 82.5 kg (181 lb 12.8 oz)   02/14/19 86.7 kg (191 lb 3.2 oz)   01/28/19 82.1 kg (181 lb)   08/13/18 76.7 kg (169 lb 3.2 oz)     BP - Mean:  [60-87] 61  FiO2 (%): 50 %  Resp: 19    Recent Labs   Lab 02/20/19  1035 02/20/19  0847   PH 7.14*  --    PCO2 15*  --    PO2 144*  --    HCO3 5*  --    O2PER 50 8lpm mask       GEN: moderate distress. delirious   HEENT: OP patent and dry, trachea midline   PULM: unlabored synchronous with vent. Tachypneic, dry crackles bilaterally  CV/COR: tachycardic, S1S2 no gallop,  No rub, no murmur  ABD: soft mild diffuse tenderness, hypoactive bowel sounds, no mass  EXT:  Warm, 1+ edema up to hips  NEURO: alert, doesn't answer all question appropriately.   SKIN: good cap refill  LINES: clean, dry intact         Data:   All data and imaging reviewed     ROUTINE ICU LABS (Last four results)  CMP  Recent Labs   Lab 02/20/19  1130 02/20/19  1029 02/20/19  0847 02/20/19  0203 02/20/19  0200   *  --  148*  --  147*   POTASSIUM 4.3  --  4.4  --  4.1   CHLORIDE 114*  --  115*  --  114*   CO2 7*  --  8*  --  8*   ANIONGAP 25*  --  25*  --  25*   *  --  65*  --  133*   BUN 18  --  17  --  15   CR 1.37*  --  1.25*  --  1.10*   GFRESTIMATED 35*  --  39* 47* 46*   GFRESTBLACK 41*  --  46* 57* 53*   MEGHAN 7.9*  --  8.4*  --  8.8   MAG  --   --   --   --  1.3*   PHOS  --   --  4.4  --   --    PROTTOTAL  --   --  4.7*  --  4.6*   ALBUMIN  --  2.4* 2.6*  --  2.7*   BILITOTAL  --   --  5.9*  --  5.5*   ALKPHOS  --   --  49  --  60   AST  --   --  85*  --  78*   ALT  --   --  26  --  22     CBC  Recent Labs   Lab 02/20/19  0847 02/20/19  0200   WBC 2.8* 5.6   RBC 3.84 3.63*   HGB 11.1* 10.5*   HCT 35.7 33.0*   MCV 93 91   MCH 28.9 28.9   MCHC 31.1* 31.8   RDW 26.4* 26.0*   * 87*     INR  Recent Labs   Lab 02/20/19  0847   INR 3.31*     Arterial Blood Gas  Recent Labs   Lab 02/20/19  1035 02/20/19  0847   PH 7.14*  --    PCO2  15*  --    PO2 144*  --    HCO3 5*  --    O2PER 50 8lpm mask       All cultures:  Recent Labs   Lab 02/20/19  0510 02/20/19  0228 02/20/19  0200   CULT PENDING No growth after 4 hours No growth after 5 hours     Recent Results (from the past 24 hour(s))   XR Chest Port 1 View    Narrative    XR CHEST PORT 1 VW  2/20/2019 2:15 AM     HISTORY: Hypoxic.    COMPARISON: 5/31/2016.    FINDINGS: Upright portable chest. The heart size is normal. The  thoracic aorta is calcified. There is a mild left basilar infiltrate.  The right lung is clear. No pneumothorax.      Impression    IMPRESSION: Mild left basilar infiltrate.    MARYAN RUANO MD   CT Chest/Abdomen/Pelvis w Contrast    Narrative    CT CHEST/ABDOMEN/PELVIS W CONTRAST  2/20/2019 3:56 AM    HISTORY: Hypoxic, recent admission. Diarrhea. Abdominal distension.    TECHNIQUE: CT scan obtained of the chest, abdomen, and pelvis with  oral and IV contrast. 91 mL Isovue-370 injected. Radiation dose for  this scan was reduced using automated exposure control, adjustment of  the mA and/or kV according to patient size, or iterative  reconstruction technique.    COMPARISON: 2/5/2019.    FINDINGS:  Chest: There is no central pulmonary embolus. No aortic aneurysm or  dissection. The heart size is normal. There is no mediastinal, hilar  or axillary lymph node enlargement. There is fibrosis at the periphery  of the lungs and at the lung bases. No pneumothorax or pleural  effusion.    Abdomen: The liver is cirrhotic. No focal mass. The portal vein is  patent. There are multiple upper abdominal and distal esophageal  varices. The spleen is normal in size. The gallbladder is absent. The  pancreas, adrenal glands and kidneys are normal in appearance. There  is no abdominal or pelvic lymph node enlargement. There is  atherosclerotic calcification of the aorta and its branches. No  aneurysm.    Pelvis: There is a moderate amount of ascites. Multiple loops of small  bowel appear  thick-walled, particularly proximally. No bowel  obstruction. Colon is decompressed and within normal limits. There is  mesenteric edema. No free intraperitoneal gas. Degenerative disease  throughout the spine.      Impression    IMPRESSION:  1. Cirrhotic liver and evidence of portal hypertension as demonstrated  by a moderate amount of ascites and upper abdominal and distal  esophageal varices.  2. No acute chest abnormality.  3. Thick-walled small bowel loops are likely secondary to chronic  liver disease. Enteritis could also have this appearance. No bowel  obstruction.    MARYAN RUANO MD   XR Chest Port 1 View    Narrative    CHEST PORTABLE ONE VIEW   2/20/2019 9:41 AM     HISTORY: Shortness of breath.    COMPARISON: Chest x-ray from 0210 hours today.      Impression    IMPRESSION: Heart size is normal. Pulmonary vasculature does not  appear distended. There is interstitial prominence in the left midlung  zone similar to the prior study likely representing chronic  interstitial fibrosis. Cannot exclude infiltrate in the left lung base  although I suspect this is a chronic fibrotic appearance. No  significant change.    DALLAS SMITH MD   US Paracentesis Portable    Narrative    EXAM: US PARACENTESIS PORTABLE  2/20/2019 11:17 AM       HISTORY:Sepsis, moderate ascites .      PROCEDURE:  Written informed consent was obtained from the patient  prior to the procedure. The risks and benefits including bleeding,  infection and abdominal organ injury were discussed and the patient  wished to continue. Initial ultrasound images demonstrated a large  left lower quadrant fluid collection. The skin overlying this  collection was marked, prepped, draped and anesthetized in usual  sterile fashion utilizing 10 mL 1% lidocaine. The paracentesis  catheter was then placed into the peritoneal fluid collection with  return of 300 mL of ray colored fluid. Patient tolerated the  procedure well. The patient will receive  intravenous albumin on the  usual sliding scale as needed per protocol.      US guidance was utilized to enter the peritoneal space for  paracentesis with permanent image recoding.      Impression    IMPRESSION:  Ultrasound-guided paracentesis.      ELISABETH SARKAR PA-C   XR Chest Port 1 View    Narrative    XR CHEST PORT 1 VW 2/20/2019 12:15 PM    COMPARISON: Chest x-ray earlier on the same day.    HISTORY: Central line placement.      Impression    IMPRESSION: RIGHT internal jugular central venous catheter tip at the  atrial caval junction. Interstitial prominence again seen at the LEFT  base. No new focal airspace disease. No pleural effusion or  pneumothorax seen on either side.    OMARI MARTINEZ MD         Billing: This patient is critically ill: Yes. Total critical care time today 60 min excluding procedure time.

## 2019-02-20 NOTE — PROCEDURES
Arterial blood pressure monitor    Weak pulse in the right radial artery.  Ultrasound with visible pulsatile vessel.  Consent obtained  Time out performed  Skin prepped with chlorhexidine and draped.    With ultrasound guidance, arrow kit used and had blood return on first stick.  Catheter unable to be passed.  Small hematoma formed.  2nd attempt was made more proximal on radial artery and catheter was able to be advanced easily.  Good waveform.  Suture in placed and tagaderm dressing placed.      Ryan Armstrong MD

## 2019-02-20 NOTE — PLAN OF CARE
Pt arrived to ICU around 0550. Pt arrived with increased respiratory effort using accessory muscles. Pt HR unchanged from report given from ER nurse francesca in the 120's. Pt lungs sounded coarse, with some fine crackles. Pt does have a small scabbing wound on the dorsal side of her left foot. Pt also does have a little area with some excoriation between her coccyx and her anus. Preventative foam pad place above this spot, coccyx erythremic, but blanchable. Pt CAM negative, daughter in room, pt at her cognitive baseline. Will continue to monitor.

## 2019-02-20 NOTE — H&P
[facility]    History and Physical - Hospitalist Service       Date of Admission:  2/20/2019    Assessment & Plan   Evy Song is a 84 year old female with PMH of Pulmonary Fibrosis, DM2, HTN, URBAN, REY and recent admission for RLE cellulitis is admitted on 2/20/2019 for SOB, Nausea, vomiting and diarrhea.    1.) Sepsis  - has lactic acidosis demonstrated by lactate of 16, also blood gases are acidotic, she is tachycardia and hypotensive as well  - Not sure what infectious etiology this is, while her CXR showed mild L basila infiltrate CT scan showed nothing  - UA has not been drawn yet, she makes no urinary tract complaints  - US Abdomen and CT Adb done in ER and not a lot of ascites, probably not SBP  - completed abx course for cellulitis, but her RLE looks like it may be a little erythematous tonight, difficult to assess due to LE lymphedema wraps  - perhaps is C diff given the diarrhea so toxin sent from ER  - could also be viral GI etiology, has viral enteric panel pending  - plan is to cover with emperic broad spectrum abx tonight, Vanc and Zosyn  - await blood and urine cultures  - hydrate with IVFs  - will follow troponin as well, is 0.044 which is still negative but borderline    2.) WILLEM   - mildly elevated Cr, probably pre-renal given emesis and diarrhea  - could also explain elevated lactate  - will hydrate with NS@100cc/hr, has an EF of 70% noted on last TTE  - also was given 1L of IVF in ER    3.) Pulmonary Fibrosis  - O2 demand sup tonight, did a short trail on BiPAP in the ER and now back on NC  - consider steroids if trouble getting back to baseline of 2L NC  - albuterol nebs PRN    4.) DM2 with Hypoglycemia this AM  - was found with glucose of 50 at SNF  - PTA is on metformin, I dont see any insulin  - her A1c is 5.7, probably too low for this PTA regimen  - will cover with sliding scale insulin for now    5.) HTN  - LAsix PTA is on hold due to mild hypotension  - resume when BP  is stable off IVFs       Diet: ZULY  DVT Prophylaxis: Pneumatic Compression Devices  Ross Catheter: not present  Code Status: FULL    Disposition Plan   Expected discharge: 2 - 3 days, recommended to transitional care unit once blood pressure greater than 110/70, O2 use less than 2 liters/minute, renal function improved, safe disposition plan/ TCU bed available and SIRS/Sepsis treated.  Entered: Vicente Ventura MD 02/20/2019, 4:13 AM     The patient's care was discussed with the Patient.    Vicente Ventura MD  [facility]    ______________________________________________________________________    Chief Complaint   SOB, Abdominal Pain, Nausea/Vomiting and Diarrhea    History is obtained from the patient, electronic health record, emergency department physician and patient's daughter    History of Present Illness   Evy Song is a 84 year old female who has PMH of DM2, URBAN, Pulmonary fibrosis, REY and HTN is sent to [facility] from her SNF due to SOB, abdominal pain, nausea/vomiting and diarrhea.  She is at a SNF due to recent admission for RLE cellulitis for which she has already completed course of abx.  Her baseline O2 needs are 2L NC, they had to turn up a few L due to sats in low 80s.  EMS found her to be hypotensive, tachycardic, and hypoglycemic to 50.  Received dextrose in the field.  Confusion improved, she has dementia at baseline.  She denies any pain right now.  Her workup in the ED is unrevealing so far.  CXR showed mild left basilar infiltrate but CT Chest showed no infiltrate or PE.  CT Abdomen didn't show any acute process.  UA is pending although she denies any dysuria.  Her WBC is WNL but her Lactate is 16, also note her Cr is a little elevated to 1.1 tonight.  Her BP and HR responded to IVF bolus.  She is being admitted for sepsis of unknown etiology.    Review of Systems    The 10 point Review of Systems is negative other than noted in the HPI    Past Medical  History    I have reviewed this patient's medical history and updated it with pertinent information if needed.   Past Medical History:   Diagnosis Date     Arthritis      Cirrhosis (H) 2016     Diabetes mellitus (H)      Essential hypertension      Glaucoma 2002    controlled with eye drops     HLD (hyperlipidemia)      Irritable bowel syndrome      Obesity      REY (obstructive sleep apnea)      Polyneuropathy      Pulmonary fibrosis (H) 2016     SOB (shortness of breath)      SVT (supraventricular tachycardia) (H)      Tachycardia 5/2016       Past Surgical History   I have reviewed this patient's surgical history and updated it with pertinent information if needed.  Past Surgical History:   Procedure Laterality Date     C APPENDECTOMY  1993     Cataract surgery - both eyes  1991/1996     COLONOSCOPY  2007     LAPAROSCOPIC CHOLECYSTECTOMY  10/2009     ORIF ankle + foot/Toe surgery  3/1999       Social History   I have reviewed this patient's social history and updated it with pertinent information if needed.  Social History     Tobacco Use     Smoking status: Never Smoker     Smokeless tobacco: Never Used   Substance Use Topics     Alcohol use: No     Alcohol/week: 0.0 oz     Drug use: No       Family History   I have reviewed this patient's family history and updated it with pertinent information if needed.   Family History   Problem Relation Age of Onset     Cancer Mother         liver     Heart Disease Maternal Grandmother      Heart Disease Maternal Grandfather      Heart Disease Paternal Grandmother      Heart Disease Paternal Grandfather      Connective Tissue Disorder Sister         fibromyalgia     Diabetes Father         late onset     Alzheimer Disease No family hx of        Prior to Admission Medications   Prior to Admission Medications   Prescriptions Last Dose Informant Patient Reported? Taking?   ACE/ARB/ARNI NOT PRESCRIBED, INTENTIONAL,   No No   Sig: Please choose reason not prescribed, below    ALBUTEROL 108 (90 BASE) MCG/ACT inhaler   Yes No   Sig: INHALE 2 PUFF BY INHALATION ROUTE EVERY 4 - 6 HOURS AS NEEDED   ARNUITY ELLIPTA 100 MCG/ACT AEPB inhalation powder   Yes No   Sig: Take 100 mcg by mouth daily   ASPIRIN 81 MG OR TABS   No No   Si tab po QD (Once per day)   AZOPT 1 % OP SUSP   Yes No   Si DROP BOTH EYES BID (am and hs)  RIMA (Brand only)   EPINEPHrine (EPIPEN 2-LALA) 0.3 MG/0.3ML injection   No No   Sig: Inject 0.3 mLs (0.3 mg) into the muscle once as needed for anaphylaxis   LYRICA 75 MG capsule   Yes No   Sig: Take 150 mg by mouth nightly as needed    XALATAN 0.005 % OP SOLN   Yes No   Si drop  bot eyes at hs   furosemide (LASIX) 20 MG tablet   Yes No   Sig: Take 1 tablet (20 mg) by mouth 2 times daily With breakfast and lunch   hydrocortisone (WESTCORT) 0.2 % cream   No No   Sig: Apply sparingly to affected area as needed up to twice daily   metFORMIN (GLUCOPHAGE-XR) 500 MG 24 hr tablet   Yes No   Sig: Take 1 tablet (500 mg) by mouth daily (with dinner)   omeprazole (PRILOSEC) 20 MG CR capsule   Yes No   Sig: Take 20 mg by mouth daily   potassium chloride ER (KLOR-CON) 10 MEQ CR tablet   No No   Sig: Take 1 tablet (10 mEq) by mouth 2 times daily With breakfast and lunch   rOPINIRole (REQUIP) 0.5 MG tablet   No No   Sig: Take 1 tablet (0.5 mg) by mouth every 24 hours   rOPINIRole (REQUIP) 1 MG tablet   No No   Sig: Take 1 tablet (1 mg) by mouth At Bedtime   simvastatin (ZOCOR) 20 MG tablet   No No   Sig: Take 1 tablet (20 mg) by mouth At Bedtime   spironolactone (ALDACTONE) 50 MG tablet   No No   Sig: Take 1 tablet (50 mg) by mouth 2 times daily      Facility-Administered Medications: None     Allergies   Allergies   Allergen Reactions     Monosodium Glutamate Cough and Shortness Of Breath     Timolol Rash     Bee Venom      hives,anaphylaxis     Benadryl [Diphenhydramine] Other (See Comments)     Lightheaded     Celecoxib      spasms       Physical Exam   Vital Signs:     BP:  116/58 Pulse: 133   Resp: 30 SpO2: 94 % O2 Device: Non-rebreather mask Oxygen Delivery: 15 LPM  Weight: 0 lbs 0 oz    Constitutional: awake, alert, cooperative, no apparent distress, and appears stated age  Hematologic / Lymphatic: no cervical lymphadenopathy and no supraclavicular lymphadenopathy  Respiratory: No increased work of breathing, good air exchange, clear to auscultation bilaterally, no crackles or wheezing  Cardiovascular: Normal apical impulse, regular rate and rhythm, normal S1 and S2, no S3 or S4, and no murmur noted  GI: No scars, normal bowel sounds, soft, non-distended, non-tender, no masses palpated, no hepatosplenomegally  Skin: no bruising or bleeding and there is mild anterior right shin erythema but the area is warm and nontender to palpation  Musculoskeletal: full range of motion noted  motor strength is 5 out of 5 all extremities bilaterally  tone is normal  Neurologic: Cranial Nerves:  cranial nerves II-XII are grossly intact  Sensory:  Sensory intact  Deep Tendon Reflexes:  Reflexes are intact and symmetrical bilaterally    Data   Data reviewed today: I reviewed all medications, new labs and imaging results over the last 24 hours. I personally reviewed CXR shows mild left basilar infiltrate.    Recent Labs   Lab 02/20/19  0200 02/13/19  0656   WBC 5.6  --    HGB 10.5*  --    MCV 91  --    PLT 87*  --    * 142   POTASSIUM 4.1 3.7   CHLORIDE 114* 110*   CO2 8* 25   BUN 15 30   CR 1.10* 1.00   ANIONGAP 25* 7   MEGHAN 8.8 8.8   * 102*   ALBUMIN 2.7*  --    PROTTOTAL 4.6*  --    BILITOTAL 5.5*  --    ALKPHOS 60  --    ALT 22  --    AST 78*  --    TROPI 0.044  --      Recent Results (from the past 24 hour(s))   XR Chest Port 1 View    Narrative    XR CHEST PORT 1 VW  2/20/2019 2:15 AM     HISTORY: Hypoxic.    COMPARISON: 5/31/2016.    FINDINGS: Upright portable chest. The heart size is normal. The  thoracic aorta is calcified. There is a mild left basilar infiltrate.  The right lung  is clear. No pneumothorax.      Impression    IMPRESSION: Mild left basilar infiltrate.   CT Chest/Abdomen/Pelvis w Contrast    Narrative    CT CHEST/ABDOMEN/PELVIS W CONTRAST  2/20/2019 3:56 AM    HISTORY: Hypoxic, recent admission. Diarrhea. Abdominal distension.    TECHNIQUE: CT scan obtained of the chest, abdomen, and pelvis with  oral and IV contrast. 91 mL Isovue-370 injected. Radiation dose for  this scan was reduced using automated exposure control, adjustment of  the mA and/or kV according to patient size, or iterative  reconstruction technique.    COMPARISON: 2/5/2019.    FINDINGS:  Chest: There is no central pulmonary embolus. No aortic aneurysm or  dissection. The heart size is normal. There is no mediastinal, hilar  or axillary lymph node enlargement. There is fibrosis at the periphery  of the lungs and at the lung bases. No pneumothorax or pleural  effusion.    Abdomen: The liver is cirrhotic. No focal mass. The portal vein is  patent. There are multiple upper abdominal and distal esophageal  varices. The spleen is normal in size. The gallbladder is absent. The  pancreas, adrenal glands and kidneys are normal in appearance. There  is no abdominal or pelvic lymph node enlargement. There is  atherosclerotic calcification of the aorta and its branches. No  aneurysm.    Pelvis: There is a moderate amount of ascites. Multiple loops of small  bowel appear thick-walled, particularly proximally. No bowel  obstruction. Colon is decompressed and within normal limits. There is  mesenteric edema. No free intraperitoneal gas. Degenerative disease  throughout the spine.      Impression    IMPRESSION:  1. Cirrhotic liver and evidence of portal hypertension as demonstrated  by a moderate amount of ascites and upper abdominal and distal  esophageal varices.  2. No acute chest abnormality.  3. Thick-walled small bowel loops are likely secondary to chronic  liver disease. Enteritis could also have this appearance. No  bowel  obstruction.

## 2019-02-20 NOTE — PROVIDER NOTIFICATION
Notified Dr Kaur of critical procalcitonin level at 09:31.  MD in process of placing orders.  Continue to monitor.

## 2019-02-20 NOTE — PROGRESS NOTES
"Patient seen and examine urgently this morning, history updated.   Having nausea, vomiting but mostly diarrhea. Recent admission to [facility] noted.  Now admitted to ICU with lactic acid of 16 this morning.  Only received hardly a liter of fluid since admission.    Nurse called me that patient is having hard time breathing.     Vital signs:      BP: 126/57 Pulse: 128 Heart Rate: 129 Resp: 20 SpO2: 93 % O2 Device: Oxymask Oxygen Delivery: 8 LPM      Estimated body mass index is 31.21 kg/m  as calculated from the following:    Height as of 2/5/19: 1.626 m (5' 4\").    Weight as of 2/17/19: 82.5 kg (181 lb 12.8 oz).        One Exam:    General: in distress, SOB, able to speak and answer  Neck: No JVD.  Chest: basal crackles of pulmonary fibrosis, no wheezing.   Abd: mildly distended and ascites positive, mild RUQ tenderness.   CNS: no focal deficit.   LE: bilateral LE edema as per daughter much better now, still have some mild erythema to both LE R>L.    A/P Severe Sepsis with worsening Lactic acidosis.   Recheck CBC, Renal panel, LFT, Lactic acid, CPK.   Give 1.5-2  Liter of IV LR bolus  Start her on IV Dextrose bicarb infusion drip at 150 ml/hr  Electrolyte replacement Protocol now.   Take another peripheral line.  She will need Central line to assess CVP and adjust IV fluids Accordingly .  Lactic acid check every 4 hrs  BMP check every 4 hrs.  Start her on oral Vancomycin 125 mg  QID  Continue with IV Vancomycin and Zosyn at this time.   Chest xray  Echocardiogram ordered.   Consult Critical care.   ABG now and repeat at noon.  Once her Acidosis improve her breathing improve as well.   Source of infection is uncertain, possible cellulitis, but not that  Bad to cause such problem, R/O SBP, C diff colitis, CT chest  Negative for pneumonia or pulmonary edema.         ARF: secondary to sepsis, continue with aggressive IV resuscitation   At this time .    GI prophylaxis with IV Protonix    Moderate Ascites\" consult Radiology " to do U/S guided paracentesis.  And send fluid to rule out SBP    Use BiPAP as needed, but once her acidosis improve her respiratory distress  Will improve as well.    Discussed with the patient daughter at bedside, critical condition at this time.     Total time spend today is more that 35 min of Critical care time today  Discussed with the critical care team as well.     Discussed with Bernard Pepe MD of Critical this AM, he will take over the case,   Hospitalist Service will sign off now.

## 2019-02-20 NOTE — PROGRESS NOTES
Patient bedside intubated with ETT 8.0 and secured 23 at lip. Placement was confirmed with bilateral breath sound and positive ETCO2. Patient placed on full vent support setting:    Ventilation Mode: CMV/AC  (Continuous Mandatory Ventilation/ Assist Control)  FiO2 (%): 100 %  Rate Set (breaths/minute): 28 breaths/min  Tidal Volume Set (mL): 500 mL  PEEP (cm H2O): 5 cmH2O  Oxygen Concentration (%): 100 %  Peak Inspiratory Pressure (cm H2O) (Drager Heather): 0.9  Resp: 16    Currently ABG:    Recent Labs   Lab 02/20/19  1035 02/20/19  0847   PH 7.14*  --    PCO2 15*  --    PO2 144*  --    HCO3 5*  --    O2PER 50 8lpm mask       Will continue to monitor as ordered.    Varun Aguila RT  2/20/2019

## 2019-02-20 NOTE — PROCEDURES
Central Venous Cannulation Note:    Indication: severe sepsis  Consent obtained from daughter.   Time out performed.    Skin of right neck prepped with chlorhexidine  Patient draped  3 mL of 1% lidocaine infiltrated in the skin  seldinger technique used with ultrasound guidance.   Vein accessed on first attempt.    Wire passed and wire visualized in jugular vein  Skin nick and dermis was dilated  Line placed and good blood return from each 3 ports.    Sutured to the skin, biopatch placed and tagaderm dressing applied    Patient tolerated procedure well.  Short run of VT with wire was in the vein. No other complications.   CXR pending.     Ryan Armstrong MD

## 2019-02-20 NOTE — PROVIDER NOTIFICATION
Dr Ventura notified of critical lab value, high lactic acid. Fluid bolus ordered. Will continue to monitor.

## 2019-02-20 NOTE — PROGRESS NOTES
Progress Note:    Persistent lactic acid elevation. She was becoming more hypotensive. Cell count shows many neutrophils.  Her abdominal CT scan shows mesenteric edema, and with a persistent lactic acidosis I believe the elevation of neutrophils in her ascitic fluid is related to secondary peritonitis.  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    I had a long discussion with Ms Song (who currently is delirious) along with her daughter. It was the decision by Ms Song, and confirmed by her daughter that she would not want surgery performed on her gut.  I support this decision as well, as her cirrhosis and pulmonary fibrosis would be significant comorbidities getting through a surgery like that.  That said I am not optimistic that antibiotics alone will be sufficient enough to fix the situation.    I explained to her and her daughter to general paths.  One would be comfort care.  This would involved no further antibiotics.  We will treat her pain with morphine.  Inevitably she would die from the infection.  The other option would be intubation with mechanical ventilation, continued treatment with antibiotics, and vasopressor support.  I told the family that I still suspected that she would die from the infection with this medical pathway.  With this information, they decided to pursue further aggressive medical care.     They did decide to make her a Do Not Resuscitate    She was intubated with an 8.0 endotracheal tube. Arterial line was placed. She was started on levophed. Blood culture came back with gram negative rods.  I will administer a one time dose of tobramycin.  I will give her 500 mL of LR to see if there is improvement in her MAP. CVP is 11.      Ryan Armstrong MD    Critical care time excluding procedures is 45 minutes, bringing daily total up to 135 minutes.

## 2019-02-21 LAB
BACTERIA SPEC CULT: NO GROWTH
COPATH REPORT: NORMAL
HAPTOGLOB SERPL-MCNC: 26 MG/DL (ref 35–175)
Lab: NORMAL
SPECIMEN SOURCE: NORMAL

## 2019-02-21 ASSESSMENT — ACTIVITIES OF DAILY LIVING (ADL): ADLS_ACUITY_SCORE: 25

## 2019-02-21 NOTE — DISCHARGE SUMMARY
[facility] Discharge Summary    Evy Song MRN# [id number]   Age: 84 year old YOB: 1934     Date of Admission:  2/20/2019  Date of Death::  2/20/2019  Admitting Physician:  Vicente Ventura MD  Discharge Physician:  Bernard Armstrong MD             Admission Diagnoses:   Supraventricular tachycardia (H) [I47.1]  Metabolic acidosis [E87.2]  Acute respiratory failure with hypoxia (H) [J96.01]  Vomiting and diarrhea [R11.10, R19.7]  Fever, unspecified fever cause [R50.9]            Discharge Diagnosis:   Mesenteric Ischemia  Secondary Peritonitis  E coli bacteremia  Septic Shock  Metabolic acidosis  Cirrhosis  Pulmonary fibrosis          Procedures:   Central Venous Catheter Placement  Arterial Blood pressure catheter  Endotracheal Intubation  ECHO  Chest, Abdomen, Pelvic CT scan  Paracentesis           Medications Prior to Admission:           Discharge Medications:     N/A         Consultations:   General Surgery          Brief History of Illness:   Evy Song is a 84 year old female who has PMH of DM2, URBAN, Pulmonary fibrosis, REY and HTN is sent to [facility] ER tonight from her SNF due to SOB, abdominal pain, nausea/vomiting and diarrhea.  She is at a SNF due to recent admission for RLE cellulitis for which she has already completed course of abx.  Her baseline O2 needs are 2L NC, they had to turn up a few L due to sats in low 80s.  EMS found her to be hypotensive, tachycardic, and hypoglycemic to 50.  Received dextrose in the field.  Confusion improved, she has dementia at baseline.  She denies any pain right now.  Her workup in the ED is unrevealing so far.  CXR showed mild left basilar infiltrate but CT Chest showed no infiltrate or PE.  CT Abdomen didn't show any acute process.  UA is pending although she denies any dysuria.  Her WBC is WNL but her Lactate is 16, also note her Cr is a little elevated to 1.1 tonight.  Her BP and HR responded to IVF bolus.  She is being admitted  for sepsis of unknown etiology.              Hospital Course:   Ms Song was admitted with metabolic abnormalities, and was shortly diagnosed with septic shock.  She had a persistent lactic acidosis that did not respond to fluids.  A CT of her abdomen showed some mesenteric edema.  Paracentesis demonstrated a lot of PMNs.  I took this to represent secondary peritonitis from mesenteric ischemia.  Surgery was consulted.  I has continued coversations with the patient and the daughter, and Ms Song did not want to have surgery performed.  At that point I told her it was likely she would die from this infection.  We could transition to comfort care, or alternatively intubate and continue treating with antibiotics.  She decided to be intubated.  Her blood pressure continued to drop.  I let the family know that antibiotics were not going to be able to save her and that we should make her comfort care.  Family and friends gathered and she was extubated. She  shortly after.      Cause of Death:  Septic shock  E. Coli bacteremia  Mesenteric ischemia  Secondary peritonitis          Discharge Disposition:         Attestation:    Bernard Armstrong MD

## 2019-02-21 NOTE — PLAN OF CARE
Patient was very SOB even with the BiPAP on   Calm and Cooperative - Alert and Oriented    Denies pain  Ross cath placed  Paracentesis done at the bedside - 300 ml removed - sent to lab  Central line placed - Rt internal jugular TLC  2 PIV's placed   LR fluid bolus x 2  Albumin bolus  Multiple critical lab results and Dr Armstrong was notified of them all immediately   Echo done at the bedside  Intubated at 1500  A-line placed - rt radial  NorEpi started to keep map greater than 65   Dex started for sedation   Vasopressin started   Changed to DNR   Family at the bedside

## 2019-02-21 NOTE — PROGRESS NOTES
Pt extubated at , gtts shut off at . MD pronounced Time of Death at 2210.  home arrangements have been made. Death certificate filled out.

## 2019-02-21 NOTE — DEATH PRONOUNCEMENT
MD DEATH PRONOUNCEMENT    Called to pronounce Evy Song dead.    Physical Exam: Unresponsive to noxious stimuli, Spontaneous respirations absent, Breath sounds absent, Carotid pulse absent and Heart sounds absent    Patient was pronounced dead at 2210.    Preliminary Cause of Death: Septic shock 2/2 intra-abdominal process    Active Problems:    Sepsis (H)     Infectious disease present?: NO    Communicable disease present? (examples: HIV, chicken pox, TB, Ebola, CJD) :  NO    Multi-drug resistant organism present? (example: MRSA): NO    Please consider an autopsy if any of the following exist:  NO Unexpected or unexplained death during or following any dental, medical, or surgical diagnostic treatment procedures.   NO Death of mother at or up to seven days after delivery.     NO All  and pediatric deaths.     NO Death where the cause is sufficiently obscure to delay completion of the death certificate.   NO Deaths in which autopsy would confirm a suspected illness/condition that would affect surviving family members or recipients of transplanted organs.     The following deaths must be reported to the 's Office:  NO A death that may be due entirely or in part to any factors other than natural disease (recent surgery, recent trauma, suspected abuse/neglect).   NO A death that may be an accident, suicide, or homicide.     NO Any sudden, unexpected death in which there is no prior history of significant heart disease or any other condition associated with sudden death.   NO A death under suspicious, unusual, or unexpected circumstances.    NO Any death which is apparently due to natural causes but in which the  does not have a personal physician familiar with the patient s medical history, social, or environmental situation or the circumstances of the terminal event.   NO Any death apparently due to Sudden Infant Death Syndrome.     NO Deaths that occur during, in  association with, or as consequences of a diagnostic, therapeutic, or anesthetic procedure.   NO Any death in which a fracture of a major bone has occurred within the past (6) six months.   NO A death of persons note seen by their physician within 120 days of demise.     NO Any death in which the  was an inmate of a public institution or was in the custody of Law Enforcement personnel.   NO  All unexpected deaths of children   NO Solid organ donors   NO Unidentified bodies   NO Deaths of persons whose bodies are to be cremated or otherwise disposed of so that the bodies will later be unavailable for examination;   NO Deaths unattended by a physician outside of a licensed healthcare facility or licensed residential hospice program   NO Deaths occurring within 24 hours of arrival to a health care facility if death is unexpected.    NO Deaths associated with the decedent s employment.   NO Deaths attributed to acts of terrorism.   NO Any death in which there is uncertainty as to whether it is a medical examiner s care should be discussed with the medical investigator.        Body disposition: Body released to the morgue.    LANCE Altamirano, CNP  Hospitalist - [facility]  Text Page  ([phone])

## 2019-02-22 LAB
BACTERIA SPEC CULT: ABNORMAL
BACTERIA SPEC CULT: ABNORMAL
INTERPRETATION ECG - MUSE: NORMAL
Lab: ABNORMAL
SPECIMEN SOURCE: ABNORMAL

## 2019-02-23 LAB
BACTERIA SPEC CULT: ABNORMAL
Lab: ABNORMAL
SPECIMEN SOURCE: ABNORMAL
SPECIMEN SOURCE: ABNORMAL

## 2019-02-25 ENCOUNTER — TELEPHONE (OUTPATIENT)
Dept: FAMILY MEDICINE | Facility: CLINIC | Age: 84
End: 2019-02-25

## 2019-02-25 NOTE — TELEPHONE ENCOUNTER
Forms in your bin to review and sign for [facility]. Pt has since past, however forms need to be signed.    Thanks, Betty

## 2019-02-27 LAB
BACTERIA SPEC CULT: NORMAL
Lab: NORMAL
SPECIMEN SOURCE: NORMAL

## 2019-02-28 LAB
GLUCOSE BLDC GLUCOMTR-MCNC: 111 MG/DL (ref 70–99)
GLUCOSE BLDC GLUCOMTR-MCNC: 115 MG/DL (ref 70–99)
GLUCOSE BLDC GLUCOMTR-MCNC: 130 MG/DL (ref 70–99)
GLUCOSE BLDC GLUCOMTR-MCNC: 62 MG/DL (ref 70–99)
GLUCOSE BLDC GLUCOMTR-MCNC: 69 MG/DL (ref 70–99)
GLUCOSE BLDC GLUCOMTR-MCNC: 73 MG/DL (ref 70–99)
GLUCOSE BLDC GLUCOMTR-MCNC: 77 MG/DL (ref 70–99)
GLUCOSE BLDC GLUCOMTR-MCNC: 87 MG/DL (ref 70–99)
GLUCOSE BLDC GLUCOMTR-MCNC: 93 MG/DL (ref 70–99)
